# Patient Record
Sex: MALE | Race: WHITE | NOT HISPANIC OR LATINO | Employment: FULL TIME | URBAN - METROPOLITAN AREA
[De-identification: names, ages, dates, MRNs, and addresses within clinical notes are randomized per-mention and may not be internally consistent; named-entity substitution may affect disease eponyms.]

---

## 2017-05-31 ENCOUNTER — PATIENT MESSAGE (OUTPATIENT)
Dept: INTERNAL MEDICINE | Facility: CLINIC | Age: 42
End: 2017-05-31

## 2017-05-31 RX ORDER — CLONAZEPAM 1 MG/1
1 TABLET ORAL 2 TIMES DAILY PRN
Qty: 21 TABLET | Refills: 0 | OUTPATIENT
Start: 2017-05-31 | End: 2018-05-31

## 2017-06-01 ENCOUNTER — OFFICE VISIT (OUTPATIENT)
Dept: INTERNAL MEDICINE | Facility: CLINIC | Age: 42
End: 2017-06-01
Payer: COMMERCIAL

## 2017-06-01 VITALS
TEMPERATURE: 99 F | SYSTOLIC BLOOD PRESSURE: 142 MMHG | BODY MASS INDEX: 28.78 KG/M2 | WEIGHT: 212.5 LBS | HEIGHT: 72 IN | HEART RATE: 90 BPM | DIASTOLIC BLOOD PRESSURE: 84 MMHG | OXYGEN SATURATION: 95 %

## 2017-06-01 DIAGNOSIS — F43.0 ACUTE STRESS REACTION: ICD-10-CM

## 2017-06-01 DIAGNOSIS — R03.0 ELEVATED BLOOD PRESSURE READING: Primary | ICD-10-CM

## 2017-06-01 PROCEDURE — 99999 PR PBB SHADOW E&M-EST. PATIENT-LVL III: CPT | Mod: PBBFAC,,, | Performed by: FAMILY MEDICINE

## 2017-06-01 PROCEDURE — 99213 OFFICE O/P EST LOW 20 MIN: CPT | Mod: S$GLB,,, | Performed by: FAMILY MEDICINE

## 2017-06-01 RX ORDER — CLONAZEPAM 1 MG/1
1 TABLET ORAL 2 TIMES DAILY PRN
Qty: 21 TABLET | Refills: 3 | Status: SHIPPED | OUTPATIENT
Start: 2017-06-01 | End: 2018-09-24

## 2017-06-01 NOTE — PROGRESS NOTES
Chief Complaint:    Chief Complaint   Patient presents with    Medication Refill       History of Present Illness:    Patient presents today for a six-month follow-up is still going through his divorce and tests recently finished a process.  These going to the gym has trouble with sleep sometimes.  Denies any depression.  Takes Klonopin as needed.    ROS:  Review of Systems   Constitutional: Negative for activity change, chills, fatigue, fever and unexpected weight change.   HENT: Negative for congestion, ear discharge, ear pain, hearing loss, postnasal drip and rhinorrhea.    Eyes: Negative for pain and visual disturbance.   Respiratory: Negative for cough, chest tightness and shortness of breath.    Cardiovascular: Negative for chest pain and palpitations.   Gastrointestinal: Negative for abdominal pain, diarrhea and vomiting.   Endocrine: Negative for heat intolerance.   Genitourinary: Negative for dysuria, flank pain, frequency and hematuria.   Musculoskeletal: Negative for back pain, gait problem and neck pain.   Skin: Negative for color change and rash.   Neurological: Negative for dizziness, tremors, seizures, numbness and headaches.   Psychiatric/Behavioral: Negative for agitation, hallucinations, self-injury, sleep disturbance and suicidal ideas. The patient is nervous/anxious.        Past Medical History:   Diagnosis Date    Anxiety     Hyperlipidemia        Social History:  Social History     Social History    Marital status:      Spouse name: N/A    Number of children: N/A    Years of education: N/A     Social History Main Topics    Smoking status: Former Smoker     Packs/day: 1.00     Years: 17.00    Smokeless tobacco: Never Used    Alcohol use 1.0 oz/week     2 drink(s) per week    Drug use: No    Sexual activity: Yes     Partners: Female     Birth control/ protection: None     Other Topics Concern    None     Social History Narrative    None       Family History:   family history  includes Heart disease in his father.    Health Maintenance   Topic Date Due    Influenza Vaccine  08/01/2017    Lipid Panel  09/13/2017    TETANUS VACCINE  08/25/2026       Physical Exam:    Vital Signs  Temp: 99 °F (37.2 °C)  Temp src: Tympanic  Pulse: 90  SpO2: 95 %  BP: (!) 142/84  Pain Score: 0-No pain  Height and Weight  Height: 6' (182.9 cm)  Weight: 96.4 kg (212 lb 8.4 oz)  BSA (Calculated - sq m): 2.21 sq meters  BMI (Calculated): 28.9  Weight in (lb) to have BMI = 25: 183.9]    Body mass index is 28.82 kg/m².    Physical Exam   Constitutional: He is oriented to person, place, and time. He appears well-developed.   HENT:   Mouth/Throat: Oropharynx is clear and moist.   Eyes: Conjunctivae are normal. Pupils are equal, round, and reactive to light.   Neck: Normal range of motion. Neck supple.   Cardiovascular: Normal rate, regular rhythm and normal heart sounds.    No murmur heard.  Pulmonary/Chest: Effort normal and breath sounds normal. No respiratory distress. He has no wheezes. He has no rales. He exhibits no tenderness.   Abdominal: Soft. He exhibits no distension and no mass. There is no tenderness. There is no guarding.   Musculoskeletal: He exhibits no edema or tenderness.   Lymphadenopathy:     He has no cervical adenopathy.   Neurological: He is alert and oriented to person, place, and time. He has normal reflexes.   Skin: Skin is warm and dry.   Psychiatric: He has a normal mood and affect. His behavior is normal. Judgment and thought content normal.       Lab Results   Component Value Date    CHOL 201 (H) 09/13/2016    CHOL 189 10/14/2015    CHOL 200 (H) 06/19/2015    TRIG 173 (H) 09/13/2016    TRIG 252 (H) 10/14/2015    TRIG 406 (H) 06/19/2015    HDL 40 09/13/2016    HDL 35 (L) 10/14/2015    HDL 33 (L) 06/19/2015    TOTALCHOLEST 5.0 09/13/2016    TOTALCHOLEST 5.4 (H) 10/14/2015    TOTALCHOLEST 6.1 (H) 06/19/2015    NONHDLCHOL 161 09/13/2016    NONHDLCHOL 154 10/14/2015    NONHDLCHOL 167  06/19/2015       Lab Results   Component Value Date    HGBA1C 5.3 09/13/2016       Assessment:      ICD-10-CM ICD-9-CM   1. Elevated blood pressure reading R03.0 796.2   2. Acute stress reaction F43.0 308.9         Plan:  Patient's Klonopin was filled today.  I've advised him to monitor his blood pressure carefully take it twice a day and bring the numbers in 2 weeks.  No orders of the defined types were placed in this encounter.      Current Outpatient Prescriptions   Medication Sig Dispense Refill    clonazePAM (KLONOPIN) 1 MG tablet Take 1 tablet (1 mg total) by mouth 2 (two) times daily as needed for Anxiety. 21 tablet 3    sildenafil (VIAGRA) 100 MG tablet Take 1 tablet (100 mg total) by mouth daily as needed for Erectile Dysfunction. 10 tablet 12     No current facility-administered medications for this visit.        Medications Discontinued During This Encounter   Medication Reason    buPROPion (WELLBUTRIN XL) 150 MG TB24 tablet     clonazePAM (KLONOPIN) 1 MG tablet Reorder       No Follow-up on file.      Dr Opal Lara MD    Disclaimer: This note is prepared using voice recognition system and as such is likely to have errors and is not proof read.

## 2017-08-07 ENCOUNTER — TELEPHONE (OUTPATIENT)
Dept: FAMILY MEDICINE | Facility: CLINIC | Age: 42
End: 2017-08-07

## 2017-08-07 NOTE — TELEPHONE ENCOUNTER
Spoke with patient concerning annual exam appointment. He will contact his insurance to make sure they will pay for it before his annual is due in September.

## 2017-10-06 ENCOUNTER — TELEPHONE (OUTPATIENT)
Dept: FAMILY MEDICINE | Facility: CLINIC | Age: 42
End: 2017-10-06

## 2017-10-06 DIAGNOSIS — Z00.00 WELL ADULT HEALTH CHECK: Primary | ICD-10-CM

## 2017-10-06 NOTE — TELEPHONE ENCOUNTER
----- Message from Alcira Turpin sent at 10/6/2017  9:37 AM CDT -----  Contact: self [phone]  States that he has an appt with Dr Lara on 10/12/17 for annual. States that he would like labs order to be done on Monday. Please call back at [phone]//thank you acc

## 2017-10-09 ENCOUNTER — LAB VISIT (OUTPATIENT)
Dept: LAB | Facility: HOSPITAL | Age: 42
End: 2017-10-09
Attending: FAMILY MEDICINE
Payer: COMMERCIAL

## 2017-10-09 DIAGNOSIS — Z00.00 WELL ADULT HEALTH CHECK: ICD-10-CM

## 2017-10-09 LAB
ALBUMIN SERPL BCP-MCNC: 3.7 G/DL
ALP SERPL-CCNC: 57 U/L
ALT SERPL W/O P-5'-P-CCNC: 20 U/L
ANION GAP SERPL CALC-SCNC: 8 MMOL/L
AST SERPL-CCNC: 17 U/L
BASOPHILS # BLD AUTO: 0.02 K/UL
BASOPHILS NFR BLD: 0.2 %
BILIRUB SERPL-MCNC: 0.8 MG/DL
BUN SERPL-MCNC: 12 MG/DL
CALCIUM SERPL-MCNC: 9.2 MG/DL
CHLORIDE SERPL-SCNC: 105 MMOL/L
CHOLEST SERPL-MCNC: 194 MG/DL
CHOLEST/HDLC SERPL: 5.2 {RATIO}
CO2 SERPL-SCNC: 26 MMOL/L
CREAT SERPL-MCNC: 1 MG/DL
DIFFERENTIAL METHOD: ABNORMAL
EOSINOPHIL # BLD AUTO: 0.1 K/UL
EOSINOPHIL NFR BLD: 1.2 %
ERYTHROCYTE [DISTWIDTH] IN BLOOD BY AUTOMATED COUNT: 12.7 %
EST. GFR  (AFRICAN AMERICAN): >60 ML/MIN/1.73 M^2
EST. GFR  (NON AFRICAN AMERICAN): >60 ML/MIN/1.73 M^2
GLUCOSE SERPL-MCNC: 76 MG/DL
HCT VFR BLD AUTO: 45.7 %
HDLC SERPL-MCNC: 37 MG/DL
HDLC SERPL: 19.1 %
HGB BLD-MCNC: 15.2 G/DL
LDLC SERPL CALC-MCNC: 97.8 MG/DL
LYMPHOCYTES # BLD AUTO: 2.4 K/UL
LYMPHOCYTES NFR BLD: 29.9 %
MCH RBC QN AUTO: 32.5 PG
MCHC RBC AUTO-ENTMCNC: 33.3 G/DL
MCV RBC AUTO: 98 FL
MONOCYTES # BLD AUTO: 0.8 K/UL
MONOCYTES NFR BLD: 9.2 %
NEUTROPHILS # BLD AUTO: 4.8 K/UL
NEUTROPHILS NFR BLD: 59 %
NONHDLC SERPL-MCNC: 157 MG/DL
PLATELET # BLD AUTO: 229 K/UL
PMV BLD AUTO: 10.6 FL
POTASSIUM SERPL-SCNC: 4.2 MMOL/L
PROT SERPL-MCNC: 7.3 G/DL
RBC # BLD AUTO: 4.67 M/UL
SODIUM SERPL-SCNC: 139 MMOL/L
TRIGL SERPL-MCNC: 296 MG/DL
WBC # BLD AUTO: 8.14 K/UL

## 2017-10-09 PROCEDURE — 36415 COLL VENOUS BLD VENIPUNCTURE: CPT | Mod: PO

## 2017-10-09 PROCEDURE — 80053 COMPREHEN METABOLIC PANEL: CPT

## 2017-10-09 PROCEDURE — 80061 LIPID PANEL: CPT

## 2017-10-09 PROCEDURE — 85025 COMPLETE CBC W/AUTO DIFF WBC: CPT

## 2017-10-09 PROCEDURE — 83036 HEMOGLOBIN GLYCOSYLATED A1C: CPT

## 2017-10-10 LAB
ESTIMATED AVG GLUCOSE: 97 MG/DL
HBA1C MFR BLD HPLC: 5 %

## 2017-10-12 ENCOUNTER — OFFICE VISIT (OUTPATIENT)
Dept: FAMILY MEDICINE | Facility: CLINIC | Age: 42
End: 2017-10-12
Payer: COMMERCIAL

## 2017-10-12 VITALS
OXYGEN SATURATION: 95 % | BODY MASS INDEX: 28.93 KG/M2 | TEMPERATURE: 99 F | HEIGHT: 72 IN | SYSTOLIC BLOOD PRESSURE: 128 MMHG | HEART RATE: 80 BPM | DIASTOLIC BLOOD PRESSURE: 74 MMHG | WEIGHT: 213.63 LBS

## 2017-10-12 DIAGNOSIS — Z72.0 TOBACCO ABUSE: ICD-10-CM

## 2017-10-12 DIAGNOSIS — Z00.00 WELL ADULT EXAM: Primary | ICD-10-CM

## 2017-10-12 PROCEDURE — 99396 PREV VISIT EST AGE 40-64: CPT | Mod: S$GLB,,, | Performed by: FAMILY MEDICINE

## 2017-10-12 PROCEDURE — 99999 PR PBB SHADOW E&M-EST. PATIENT-LVL III: CPT | Mod: PBBFAC,,, | Performed by: FAMILY MEDICINE

## 2017-10-12 NOTE — PROGRESS NOTES
Chief Complaint:    Chief Complaint   Patient presents with    Follow-up       History of Present Illness:    Here for physical.  Doing well  Please unable to exercise due to recent episode of Achillis tendinitis and plantar fasciitis.  Still smokes but does know that he needs to quit.  Not on any medication    ROS:  Review of Systems   Constitutional: Negative for activity change, chills, fatigue, fever and unexpected weight change.   HENT: Negative for congestion, ear discharge, ear pain, hearing loss, postnasal drip and rhinorrhea.    Eyes: Negative for pain and visual disturbance.   Respiratory: Negative for cough, chest tightness and shortness of breath.    Cardiovascular: Negative for chest pain and palpitations.   Gastrointestinal: Negative for abdominal pain, diarrhea and vomiting.   Endocrine: Negative for heat intolerance.   Genitourinary: Negative for dysuria, flank pain, frequency and hematuria.   Musculoskeletal: Negative for back pain, gait problem and neck pain.   Skin: Negative for color change and rash.   Neurological: Negative for dizziness, tremors, seizures, numbness and headaches.   Psychiatric/Behavioral: Negative for agitation, hallucinations, self-injury, sleep disturbance and suicidal ideas. The patient is not nervous/anxious.        Past Medical History:   Diagnosis Date    Anxiety     Hyperlipidemia        Social History:  Social History     Social History    Marital status:      Spouse name: N/A    Number of children: N/A    Years of education: N/A     Social History Main Topics    Smoking status: Current Every Day Smoker     Packs/day: 1.00     Years: 17.00    Smokeless tobacco: Never Used    Alcohol use 1.0 oz/week     2 Standard drinks or equivalent per week      Comment: social    Drug use: No    Sexual activity: Yes     Partners: Female     Birth control/ protection: None     Other Topics Concern    None     Social History Narrative    None       Family  History:   family history includes Heart disease in his father.    Health Maintenance   Topic Date Due    Pneumococcal PPSV23 (Medium Risk) (1) 07/12/1992    Lipid Panel  10/09/2018    TETANUS VACCINE  08/25/2026    Influenza Vaccine  Completed       Physical Exam:    Vital Signs  Temp: 98.6 °F (37 °C)  Temp src: Tympanic  Pulse: 80  SpO2: 95 %  BP: 128/74  BP Location: Left arm  Patient Position: Sitting  Pain Score:   2  Pain Loc: Foot  Height and Weight  Height: 6' (182.9 cm)  Weight: 96.9 kg (213 lb 10 oz)  BSA (Calculated - sq m): 2.22 sq meters  BMI (Calculated): 29  Weight in (lb) to have BMI = 25: 183.9]    Body mass index is 28.97 kg/m².    Physical Exam   Constitutional: He is oriented to person, place, and time. He appears well-developed.   HENT:   Right Ear: External ear normal.   Left Ear: External ear normal.   Mouth/Throat: Oropharynx is clear and moist.   Eyes: Conjunctivae are normal. Pupils are equal, round, and reactive to light.   Neck: Normal range of motion. Neck supple.   Cardiovascular: Normal rate, regular rhythm and normal heart sounds.    No murmur heard.  Pulmonary/Chest: Effort normal and breath sounds normal. No respiratory distress. He has no wheezes. He has no rales. He exhibits no tenderness.   Abdominal: Soft. He exhibits no distension and no mass. There is no tenderness. There is no guarding.   Musculoskeletal: He exhibits no edema or tenderness.   Lymphadenopathy:     He has no cervical adenopathy.   Neurological: He is alert and oriented to person, place, and time. He has normal reflexes.   Skin: Skin is warm and dry.   Psychiatric: He has a normal mood and affect. His behavior is normal. Judgment and thought content normal.       Lab Results   Component Value Date    CHOL 194 10/09/2017    CHOL 201 (H) 09/13/2016    CHOL 189 10/14/2015    TRIG 296 (H) 10/09/2017    TRIG 173 (H) 09/13/2016    TRIG 252 (H) 10/14/2015    HDL 37 (L) 10/09/2017    HDL 40 09/13/2016    HDL 35 (L)  10/14/2015    TOTALCHOLEST 5.2 (H) 10/09/2017    TOTALCHOLEST 5.0 09/13/2016    TOTALCHOLEST 5.4 (H) 10/14/2015    NONHDLCHOL 157 10/09/2017    NONHDLCHOL 161 09/13/2016    NONHDLCHOL 154 10/14/2015       Lab Results   Component Value Date    HGBA1C 5.0 10/09/2017       Assessment:      ICD-10-CM ICD-9-CM   1. Well adult exam Z00.00 V70.0   2. Tobacco abuse Z72.0 305.1         Plan:      Continue current meds,  Labs reviewed the patient  Refer to smoking cessation program.  Start an exercise program  Orders Placed This Encounter   Procedures    Ambulatory referral to Smoking Cessation Program       Current Outpatient Prescriptions   Medication Sig Dispense Refill    clonazePAM (KLONOPIN) 1 MG tablet Take 1 tablet (1 mg total) by mouth 2 (two) times daily as needed for Anxiety. 21 tablet 3    sildenafil (VIAGRA) 100 MG tablet Take 1 tablet (100 mg total) by mouth daily as needed for Erectile Dysfunction. 10 tablet 12     No current facility-administered medications for this visit.        There are no discontinued medications.    No Follow-up on file.      Opal Lara MD          Disclaimer: This note is prepared using voice recognition system and as such is likely to have errors and is not proof read.

## 2018-05-23 ENCOUNTER — HOSPITAL ENCOUNTER (OUTPATIENT)
Facility: HOSPITAL | Age: 43
Discharge: HOME | End: 2018-05-25
Attending: EMERGENCY MEDICINE | Admitting: SURGERY
Payer: COMMERCIAL

## 2018-05-23 ENCOUNTER — OFFICE VISIT (OUTPATIENT)
Dept: FAMILY MEDICINE | Facility: CLINIC | Age: 43
End: 2018-05-23
Payer: COMMERCIAL

## 2018-05-23 VITALS
BODY MASS INDEX: 29.35 KG/M2 | HEIGHT: 72 IN | DIASTOLIC BLOOD PRESSURE: 80 MMHG | SYSTOLIC BLOOD PRESSURE: 129 MMHG | HEART RATE: 68 BPM | OXYGEN SATURATION: 96 % | WEIGHT: 216.69 LBS | TEMPERATURE: 99 F

## 2018-05-23 DIAGNOSIS — R10.13 EPIGASTRIC PAIN: ICD-10-CM

## 2018-05-23 DIAGNOSIS — R10.9 ABDOMINAL PAIN, ACUTE: Primary | ICD-10-CM

## 2018-05-23 DIAGNOSIS — K35.80 ACUTE APPENDICITIS, UNSPECIFIED ACUTE APPENDICITIS TYPE: Primary | ICD-10-CM

## 2018-05-23 LAB
ALBUMIN SERPL BCP-MCNC: 4 G/DL
ALP SERPL-CCNC: 49 U/L
ALT SERPL W/O P-5'-P-CCNC: 24 U/L
ANION GAP SERPL CALC-SCNC: 11 MMOL/L
AST SERPL-CCNC: 20 U/L
BASOPHILS # BLD AUTO: 0.03 K/UL
BASOPHILS NFR BLD: 0.2 %
BILIRUB SERPL-MCNC: 0.7 MG/DL
BILIRUB UR QL STRIP: NEGATIVE
BUN SERPL-MCNC: 12 MG/DL
CALCIUM SERPL-MCNC: 9.5 MG/DL
CHLORIDE SERPL-SCNC: 105 MMOL/L
CLARITY UR: CLEAR
CO2 SERPL-SCNC: 23 MMOL/L
COLOR UR: YELLOW
CREAT SERPL-MCNC: 1.1 MG/DL
DIFFERENTIAL METHOD: ABNORMAL
EOSINOPHIL # BLD AUTO: 0.1 K/UL
EOSINOPHIL NFR BLD: 0.6 %
ERYTHROCYTE [DISTWIDTH] IN BLOOD BY AUTOMATED COUNT: 12.7 %
EST. GFR  (AFRICAN AMERICAN): >60 ML/MIN/1.73 M^2
EST. GFR  (NON AFRICAN AMERICAN): >60 ML/MIN/1.73 M^2
GLUCOSE SERPL-MCNC: 90 MG/DL
GLUCOSE UR QL STRIP: NEGATIVE
HCT VFR BLD AUTO: 45.3 %
HGB BLD-MCNC: 16 G/DL
HGB UR QL STRIP: NEGATIVE
KETONES UR QL STRIP: NEGATIVE
LEUKOCYTE ESTERASE UR QL STRIP: NEGATIVE
LIPASE SERPL-CCNC: 30 U/L
LYMPHOCYTES # BLD AUTO: 2.4 K/UL
LYMPHOCYTES NFR BLD: 12.4 %
MCH RBC QN AUTO: 33.8 PG
MCHC RBC AUTO-ENTMCNC: 35.3 G/DL
MCV RBC AUTO: 96 FL
MONOCYTES # BLD AUTO: 1.4 K/UL
MONOCYTES NFR BLD: 7 %
NEUTROPHILS # BLD AUTO: 15.6 K/UL
NEUTROPHILS NFR BLD: 79.8 %
NITRITE UR QL STRIP: NEGATIVE
PH UR STRIP: 6 [PH] (ref 5–8)
PLATELET # BLD AUTO: 225 K/UL
PMV BLD AUTO: 10.5 FL
POTASSIUM SERPL-SCNC: 4 MMOL/L
PROCALCITONIN SERPL IA-MCNC: 0.02 NG/ML
PROT SERPL-MCNC: 7.4 G/DL
PROT UR QL STRIP: NEGATIVE
RBC # BLD AUTO: 4.74 M/UL
SODIUM SERPL-SCNC: 139 MMOL/L
SP GR UR STRIP: >=1.03 (ref 1–1.03)
URN SPEC COLLECT METH UR: ABNORMAL
UROBILINOGEN UR STRIP-ACNC: NEGATIVE EU/DL
WBC # BLD AUTO: 19.49 K/UL

## 2018-05-23 PROCEDURE — 99999 PR PBB SHADOW E&M-EST. PATIENT-LVL III: CPT | Mod: PBBFAC,,, | Performed by: FAMILY MEDICINE

## 2018-05-23 PROCEDURE — 25500020 PHARM REV CODE 255: Performed by: EMERGENCY MEDICINE

## 2018-05-23 PROCEDURE — 3008F BODY MASS INDEX DOCD: CPT | Mod: CPTII,S$GLB,, | Performed by: FAMILY MEDICINE

## 2018-05-23 PROCEDURE — 11000001 HC ACUTE MED/SURG PRIVATE ROOM

## 2018-05-23 PROCEDURE — 81003 URINALYSIS AUTO W/O SCOPE: CPT

## 2018-05-23 PROCEDURE — 25000003 PHARM REV CODE 250: Performed by: EMERGENCY MEDICINE

## 2018-05-23 PROCEDURE — 96361 HYDRATE IV INFUSION ADD-ON: CPT

## 2018-05-23 PROCEDURE — 99213 OFFICE O/P EST LOW 20 MIN: CPT | Mod: S$GLB,,, | Performed by: FAMILY MEDICINE

## 2018-05-23 PROCEDURE — G0378 HOSPITAL OBSERVATION PER HR: HCPCS

## 2018-05-23 PROCEDURE — 85025 COMPLETE CBC W/AUTO DIFF WBC: CPT

## 2018-05-23 PROCEDURE — 96365 THER/PROPH/DIAG IV INF INIT: CPT

## 2018-05-23 PROCEDURE — 83690 ASSAY OF LIPASE: CPT

## 2018-05-23 PROCEDURE — 80053 COMPREHEN METABOLIC PANEL: CPT

## 2018-05-23 PROCEDURE — 84145 PROCALCITONIN (PCT): CPT

## 2018-05-23 PROCEDURE — 63600175 PHARM REV CODE 636 W HCPCS: Performed by: EMERGENCY MEDICINE

## 2018-05-23 PROCEDURE — 93010 ELECTROCARDIOGRAM REPORT: CPT | Mod: ,,, | Performed by: INTERNAL MEDICINE

## 2018-05-23 PROCEDURE — 94761 N-INVAS EAR/PLS OXIMETRY MLT: CPT

## 2018-05-23 PROCEDURE — 99285 EMERGENCY DEPT VISIT HI MDM: CPT | Mod: 25

## 2018-05-23 RX ORDER — SODIUM CHLORIDE 9 MG/ML
INJECTION, SOLUTION INTRAVENOUS CONTINUOUS
Status: DISCONTINUED | OUTPATIENT
Start: 2018-05-23 | End: 2018-05-25 | Stop reason: HOSPADM

## 2018-05-23 RX ORDER — ONDANSETRON 8 MG/1
8 TABLET, ORALLY DISINTEGRATING ORAL EVERY 8 HOURS PRN
Status: DISCONTINUED | OUTPATIENT
Start: 2018-05-23 | End: 2018-05-24

## 2018-05-23 RX ORDER — ACETAMINOPHEN 325 MG/1
650 TABLET ORAL EVERY 8 HOURS PRN
Status: DISCONTINUED | OUTPATIENT
Start: 2018-05-23 | End: 2018-05-24

## 2018-05-23 RX ORDER — RAMELTEON 8 MG/1
8 TABLET ORAL NIGHTLY PRN
Status: DISCONTINUED | OUTPATIENT
Start: 2018-05-23 | End: 2018-05-24

## 2018-05-23 RX ORDER — BUPROPION HYDROCHLORIDE 150 MG/1
150 TABLET ORAL DAILY
Qty: 30 TABLET | Refills: 11 | Status: SHIPPED | OUTPATIENT
Start: 2018-05-23 | End: 2018-09-24

## 2018-05-23 RX ORDER — DIPHENHYDRAMINE HYDROCHLORIDE 50 MG/ML
25 INJECTION INTRAMUSCULAR; INTRAVENOUS EVERY 4 HOURS PRN
Status: DISCONTINUED | OUTPATIENT
Start: 2018-05-23 | End: 2018-05-24

## 2018-05-23 RX ORDER — SODIUM CHLORIDE 0.9 % (FLUSH) 0.9 %
3 SYRINGE (ML) INJECTION
Status: DISCONTINUED | OUTPATIENT
Start: 2018-05-23 | End: 2018-05-24 | Stop reason: SDUPTHER

## 2018-05-23 RX ADMIN — IOHEXOL 75 ML: 350 INJECTION, SOLUTION INTRAVENOUS at 09:05

## 2018-05-23 RX ADMIN — IOHEXOL 30 ML: 350 INJECTION, SOLUTION INTRAVENOUS at 06:05

## 2018-05-23 RX ADMIN — SODIUM CHLORIDE 1000 ML: 0.9 INJECTION, SOLUTION INTRAVENOUS at 06:05

## 2018-05-23 RX ADMIN — AMPICILLIN SODIUM AND SULBACTAM SODIUM 3 G: 2; 1 INJECTION, POWDER, FOR SOLUTION INTRAMUSCULAR; INTRAVENOUS at 10:05

## 2018-05-23 RX ADMIN — SODIUM CHLORIDE: 0.9 INJECTION, SOLUTION INTRAVENOUS at 11:05

## 2018-05-23 NOTE — ED NOTES
Pt reports pain in right lower quad started at 1400. Pt states the pain radiates to the right flank and back.

## 2018-05-23 NOTE — LETTER
May 25, 2018    Manjinder Witt  [address] [place] [facility] [id number] [facility] [address]  Phone: [phone]  Fax: [phone] May 25, 2018     Patient: Manjinder Witt   YOB: 1975   Date of Visit: 5/23/2018       To Whom It May Concern:    Please excuse Manjinder Witt from work for medical reasons on 5/24/18 - 6/4/18.  It is my medical opinion that Manjinder Witt may return to work on 6/4/18.    If you have any questions or concerns, please don't hesitate to call.    Sincerely,        Natasha Muhammad PA-C

## 2018-05-23 NOTE — PROGRESS NOTES
Chief Complaint:    Chief Complaint   Patient presents with    Abdominal Pain       History of Present Illness:    Patient presents today complaining of abdominal pain that started this morning and gradually got worse pain is pretty severe this time associated with nausea mostly in the epigastrium with some radiation to the back.  He also has a small cystic appearing lesion on his laryngeal cartilage which is not painful.    ROS:  Review of Systems   Constitutional: Negative for activity change, chills, fatigue, fever and unexpected weight change.   HENT: Negative for congestion, ear discharge, ear pain, hearing loss, postnasal drip and rhinorrhea.    Eyes: Negative for pain and visual disturbance.   Respiratory: Negative for cough, chest tightness and shortness of breath.    Cardiovascular: Negative for chest pain and palpitations.   Gastrointestinal: Positive for abdominal pain and nausea. Negative for diarrhea and vomiting.   Endocrine: Negative for heat intolerance.   Genitourinary: Negative for dysuria, flank pain, frequency and hematuria.   Musculoskeletal: Negative for back pain, gait problem and neck pain.   Skin: Negative for color change and rash.   Neurological: Negative for dizziness, tremors, seizures, numbness and headaches.   Psychiatric/Behavioral: Negative for agitation, hallucinations, self-injury, sleep disturbance and suicidal ideas. The patient is not nervous/anxious.        Past Medical History:   Diagnosis Date    Anxiety     Hyperlipidemia        Social History:  Social History     Social History    Marital status:      Spouse name: N/A    Number of children: N/A    Years of education: N/A     Social History Main Topics    Smoking status: Current Every Day Smoker     Packs/day: 1.00     Years: 17.00    Smokeless tobacco: Never Used    Alcohol use 1.0 oz/week     2 Standard drinks or equivalent per week      Comment: social    Drug use: No    Sexual activity: Yes      [facility] ETA 45-60 mins Partners: Female     Birth control/ protection: None     Other Topics Concern    None     Social History Narrative    None       Family History:   family history includes Heart disease in his father.    Health Maintenance   Topic Date Due    Pneumococcal PPSV23 (Medium Risk) (1) 07/12/1992    Influenza Vaccine  08/01/2018    Lipid Panel  10/09/2018    TETANUS VACCINE  08/25/2026       Physical Exam:    Vital Signs  Temp: 99.1 °F (37.3 °C)  Temp src: Tympanic  Pulse: 68  SpO2: 96 %  BP: 129/80  BP Location: Left arm  Patient Position: Sitting  Pain Score:   7  Pain Loc: Abdomen  Height and Weight  Height: 6' (182.9 cm)  Weight: 98.3 kg (216 lb 11.4 oz)  BSA (Calculated - sq m): 2.23 sq meters  BMI (Calculated): 29.5  Weight in (lb) to have BMI = 25: 183.9]    Body mass index is 29.39 kg/m².    Physical Exam   Constitutional: He is oriented to person, place, and time. He appears well-developed.   HENT:   Head:       Mouth/Throat: Oropharynx is clear and moist.   Eyes: Conjunctivae are normal. Pupils are equal, round, and reactive to light.   Neck: Normal range of motion. Neck supple.   Cardiovascular: Normal rate, regular rhythm and normal heart sounds.    No murmur heard.  Pulmonary/Chest: Effort normal and breath sounds normal. No respiratory distress. He has no wheezes. He has no rales. He exhibits no tenderness.   Abdominal: Soft. He exhibits no distension and no mass. There is tenderness in the right lower quadrant and epigastric area. There is no guarding.   Musculoskeletal: He exhibits no edema or tenderness.   Lymphadenopathy:     He has no cervical adenopathy.   Neurological: He is alert and oriented to person, place, and time. He has normal reflexes.   Skin: Skin is warm and dry.   Psychiatric: He has a normal mood and affect. His behavior is normal. Judgment and thought content normal.       Results for orders placed or performed in visit on 10/09/17   Lipid panel   Result Value Ref Range     Cholesterol 194 120 - 199 mg/dL    Triglycerides 296 (H) 30 - 150 mg/dL    HDL 37 (L) 40 - 75 mg/dL    LDL Cholesterol 97.8 63.0 - 159.0 mg/dL    HDL/Chol Ratio 19.1 (L) 20.0 - 50.0 %    Total Cholesterol/HDL Ratio 5.2 (H) 2.0 - 5.0    Non-HDL Cholesterol 157 mg/dL   CBC auto differential   Result Value Ref Range    WBC 8.14 3.90 - 12.70 K/uL    RBC 4.67 4.60 - 6.20 M/uL    Hemoglobin 15.2 14.0 - 18.0 g/dL    Hematocrit 45.7 40.0 - 54.0 %    MCV 98 82 - 98 fL    MCH 32.5 (H) 27.0 - 31.0 pg    MCHC 33.3 32.0 - 36.0 g/dL    RDW 12.7 11.5 - 14.5 %    Platelets 229 150 - 350 K/uL    MPV 10.6 9.2 - 12.9 fL    Gran # (ANC) 4.8 1.8 - 7.7 K/uL    Lymph # 2.4 1.0 - 4.8 K/uL    Mono # 0.8 0.3 - 1.0 K/uL    Eos # 0.1 0.0 - 0.5 K/uL    Baso # 0.02 0.00 - 0.20 K/uL    Gran% 59.0 38.0 - 73.0 %    Lymph% 29.9 18.0 - 48.0 %    Mono% 9.2 4.0 - 15.0 %    Eosinophil% 1.2 0.0 - 8.0 %    Basophil% 0.2 0.0 - 1.9 %    Differential Method Automated    Comprehensive metabolic panel   Result Value Ref Range    Sodium 139 136 - 145 mmol/L    Potassium 4.2 3.5 - 5.1 mmol/L    Chloride 105 95 - 110 mmol/L    CO2 26 23 - 29 mmol/L    Glucose 76 70 - 110 mg/dL    BUN, Bld 12 6 - 20 mg/dL    Creatinine 1.0 0.5 - 1.4 mg/dL    Calcium 9.2 8.7 - 10.5 mg/dL    Total Protein 7.3 6.0 - 8.4 g/dL    Albumin 3.7 3.5 - 5.2 g/dL    Total Bilirubin 0.8 0.1 - 1.0 mg/dL    Alkaline Phosphatase 57 55 - 135 U/L    AST 17 10 - 40 U/L    ALT 20 10 - 44 U/L    Anion Gap 8 8 - 16 mmol/L    eGFR if African American >60.0 >60 mL/min/1.73 m^2    eGFR if non African American >60.0 >60 mL/min/1.73 m^2   Hemoglobin A1c   Result Value Ref Range    Hemoglobin A1C 5.0 4.0 - 5.6 %    Estimated Avg Glucose 97 68 - 131 mg/dL         Lab Results   Component Value Date    HGBA1C 5.0 10/09/2017       Assessment:      ICD-10-CM ICD-9-CM   1. Abdominal pain, acute R10.9 789.00     338.19         Plan:    Acute abdominal pain he will need a full workup in the ED, discussed with  patient offered to call ambulance he refused he will drive himself.  the cystic lesion appears benign we can always do an ultrasound when he comes out of the hospital.      No orders of the defined types were placed in this encounter.      Current Outpatient Prescriptions   Medication Sig Dispense Refill    clonazePAM (KLONOPIN) 1 MG tablet Take 1 tablet (1 mg total) by mouth 2 (two) times daily as needed for Anxiety. 21 tablet 3    buPROPion (WELLBUTRIN XL) 150 MG TB24 tablet Take 1 tablet (150 mg total) by mouth once daily. 30 tablet 11    sildenafil (VIAGRA) 100 MG tablet Take 1 tablet (100 mg total) by mouth daily as needed for Erectile Dysfunction. 10 tablet 12     No current facility-administered medications for this visit.        There are no discontinued medications.    No Follow-up on file.      Opal Lara MD

## 2018-05-23 NOTE — ED NOTES
Pt has bilateral expiratory wheezes and states he smokes a pack a day. The pts sats are 92% on room air. Pt denies N/V at this time.

## 2018-05-23 NOTE — ED PROVIDER NOTES
"SCRIBE #1 NOTE: I, Angelica Murguia, am scribing for, and in the presence of, Chucho Vargas Jr., MD. I have scribed the HPI, ROS and PEx.    SCRIBE #2 NOTE: I, Katerina Santana , am scribing for, and in the presence of,  Chucho Vargas Jr., MD . I have scribed the remaining portions of the note not scribed by Scribe #1.     History      Chief Complaint   Patient presents with    Abdominal Pain     " Started about 3 hours ago"       Review of patient's allergies indicates:  No Known Allergies     HPI   HPI    5/23/2018, 6:22 PM   History obtained from the patient      History of Present Illness: Manjinder Witt is a 42 y.o. male patient who presents to the Emergency Department for mid abdominal pain that radiated from the umbilical area and localized in the R lower quadrant area which onset 3 hours ago. Pt was visiting his pcp for a regular exam when he was referred to ER for further examination of symptoms. Symptoms are constant and moderate in severity. No mitigating or exacerbating factors reported. Associated sxs include nausea and diaphoresis. Patient denies any fever, chills, constipation, hematochezia, dysuria, hematuria, urinary frequency, v/d and all other sxs at this time. No prior Tx reported. Pt mentions he smokes a pack of cigarettes a day. No further complaints or concerns at this time.         Arrival mode: Personal vehicle    PCP: Opal Lara MD       Past Medical History:  Past Medical History:   Diagnosis Date    Anxiety     Hyperlipidemia        Past Surgical History:  Past Surgical History:   Procedure Laterality Date    FRACTURE SURGERY      left leg pins an screws          Family History:  Family History   Problem Relation Age of Onset    Heart disease Father        Social History:  Social History     Social History Main Topics    Smoking status: Current Every Day Smoker     Packs/day: 1.00     Years: 17.00    Smokeless tobacco: Never Used    Alcohol use 1.0 oz/week     2 Standard drinks or " equivalent per week      Comment: social    Drug use: No    Sexual activity: Yes     Partners: Female     Birth control/ protection: None       ROS   Review of Systems   Constitutional: Positive for diaphoresis. Negative for activity change, chills and fever.   HENT: Negative for congestion, drooling, ear pain, rhinorrhea, sneezing, sore throat and trouble swallowing.    Eyes: Negative for pain.   Respiratory: Negative for cough, chest tightness, shortness of breath, wheezing and stridor.    Cardiovascular: Negative for chest pain, palpitations and leg swelling.   Gastrointestinal: Positive for abdominal pain (R lower quadrant ) and nausea. Negative for abdominal distention, blood in stool, constipation, diarrhea and vomiting.   Genitourinary: Negative for difficulty urinating, dysuria, frequency, hematuria and urgency.   Musculoskeletal: Negative for arthralgias, back pain, myalgias, neck pain and neck stiffness.   Skin: Negative for pallor, rash and wound.   Neurological: Negative for dizziness, syncope, weakness, light-headedness, numbness and headaches.   All other systems reviewed and are negative.      Physical Exam      Initial Vitals [05/23/18 1727]   BP Pulse Resp Temp SpO2   (!) 151/71 74 18 98.1 °F (36.7 °C) (!) 94 %      MAP       97.67          Physical Exam  Nursing Notes and Vital Signs Reviewed.  Constitutional: Patient is in no acute distress. Well-developed and well-nourished.  Head: Atraumatic. Normocephalic.  Eyes: PERRL. EOM intact. Conjunctivae are not pale. No scleral icterus.  ENT: Mucous membranes are moist. Oropharynx is clear and symmetric.    Neck: Supple. Full ROM. No lymphadenopathy.  Cardiovascular: Regular rate. Regular rhythm. No murmurs, rubs, or gallops. Distal pulses are 2+ and symmetric.  Pulmonary/Chest: No respiratory distress. Clear to auscultation bilaterally. No wheezing or rales.  Abdominal: Soft and non-distended.  R lower quadrant tenderness.  No rebound, guarding, or  rigidity. Good bowel sounds.  Musculoskeletal: Moves all extremities. No obvious deformities. No edema. No calf tenderness.  Skin: Warm and dry.  Neurological:  Alert, awake, and appropriate.  Normal speech.  No acute focal neurological deficits are appreciated.  Psychiatric: Normal affect. Good eye contact. Appropriate in content.    ED Course    Procedures  ED Vital Signs:  Vitals:    05/23/18 1727 05/23/18 1745 05/23/18 1800 05/23/18 1830   BP: (!) 151/71 137/67 122/72 130/73   Pulse: 74 77 71 77   Resp: 18      Temp: 98.1 °F (36.7 °C)      TempSrc: Oral      SpO2: (!) 94% (!) 94% (!) 93% (!) 91%   Weight: 98.6 kg (217 lb 6 oz)      Height: 6' (1.829 m)       05/23/18 1838 05/23/18 1859 05/23/18 2031 05/23/18 2144   BP:   129/82    Pulse: 70 67 69    Resp:       Temp:    98.3 °F (36.8 °C)   TempSrc:    Oral   SpO2: 96%  95%    Weight:       Height:        05/23/18 2145 05/23/18 2301 05/24/18 0008   BP: (!) 114/57 (!) 112/59 108/65   Pulse: (!) 57 (!) 53 60   Resp:   16   Temp:   98.5 °F (36.9 °C)   TempSrc:   Oral   SpO2: 97% 97% (!) 93%   Weight:      Height:          Abnormal Lab Results:  Labs Reviewed   CBC W/ AUTO DIFFERENTIAL - Abnormal; Notable for the following:        Result Value    WBC 19.49 (*)     MCH 33.8 (*)     Gran # (ANC) 15.6 (*)     Mono # 1.4 (*)     Gran% 79.8 (*)     Lymph% 12.4 (*)     All other components within normal limits   COMPREHENSIVE METABOLIC PANEL - Abnormal; Notable for the following:     Alkaline Phosphatase 49 (*)     All other components within normal limits   URINALYSIS - Abnormal; Notable for the following:     Specific Gravity, UA >=1.030 (*)     All other components within normal limits   LIPASE   PROCALCITONIN        All Lab Results:  Results for orders placed or performed during the hospital encounter of 05/23/18   CBC W/ AUTO DIFFERENTIAL   Result Value Ref Range    WBC 19.49 (H) 3.90 - 12.70 K/uL    RBC 4.74 4.60 - 6.20 M/uL    Hemoglobin 16.0 14.0 - 18.0 g/dL     Hematocrit 45.3 40.0 - 54.0 %    MCV 96 82 - 98 fL    MCH 33.8 (H) 27.0 - 31.0 pg    MCHC 35.3 32.0 - 36.0 g/dL    RDW 12.7 11.5 - 14.5 %    Platelets 225 150 - 350 K/uL    MPV 10.5 9.2 - 12.9 fL    Gran # (ANC) 15.6 (H) 1.8 - 7.7 K/uL    Lymph # 2.4 1.0 - 4.8 K/uL    Mono # 1.4 (H) 0.3 - 1.0 K/uL    Eos # 0.1 0.0 - 0.5 K/uL    Baso # 0.03 0.00 - 0.20 K/uL    Gran% 79.8 (H) 38.0 - 73.0 %    Lymph% 12.4 (L) 18.0 - 48.0 %    Mono% 7.0 4.0 - 15.0 %    Eosinophil% 0.6 0.0 - 8.0 %    Basophil% 0.2 0.0 - 1.9 %    Differential Method Automated    Comp. Metabolic Panel   Result Value Ref Range    Sodium 139 136 - 145 mmol/L    Potassium 4.0 3.5 - 5.1 mmol/L    Chloride 105 95 - 110 mmol/L    CO2 23 23 - 29 mmol/L    Glucose 90 70 - 110 mg/dL    BUN, Bld 12 6 - 20 mg/dL    Creatinine 1.1 0.5 - 1.4 mg/dL    Calcium 9.5 8.7 - 10.5 mg/dL    Total Protein 7.4 6.0 - 8.4 g/dL    Albumin 4.0 3.5 - 5.2 g/dL    Total Bilirubin 0.7 0.1 - 1.0 mg/dL    Alkaline Phosphatase 49 (L) 55 - 135 U/L    AST 20 10 - 40 U/L    ALT 24 10 - 44 U/L    Anion Gap 11 8 - 16 mmol/L    eGFR if African American >60 >60 mL/min/1.73 m^2    eGFR if non African American >60 >60 mL/min/1.73 m^2   Lipase   Result Value Ref Range    Lipase 30 4 - 60 U/L   Urinalysis - Clean Catch   Result Value Ref Range    Specimen UA Urine, Clean Catch     Color, UA Yellow Yellow, Straw, Jackeline    Appearance, UA Clear Clear    pH, UA 6.0 5.0 - 8.0    Specific Gravity, UA >=1.030 (A) 1.005 - 1.030    Protein, UA Negative Negative    Glucose, UA Negative Negative    Ketones, UA Negative Negative    Bilirubin (UA) Negative Negative    Occult Blood UA Negative Negative    Nitrite, UA Negative Negative    Urobilinogen, UA Negative <2.0 EU/dL    Leukocytes, UA Negative Negative   Procalcitonin   Result Value Ref Range    Procalcitonin 0.02 <0.25 ng/mL         Imaging Results:  Imaging Results          CT Abdomen Pelvis With Contrast (Final result)  Result time 05/23/18 21:25:09     Final result by Naresh Hannah MD (05/23/18 21:25:09)                 Impression:      Evidence of appendicitis.    All CT scans at this facility use dose modulation, iterative reconstruction and/or weight based dosing when appropriate to reduce radiation dose to as low as reasonably achievable.      Electronically signed by: Naresh Hannah MD  Date:    05/23/2018  Time:    21:25             Narrative:    EXAMINATION:  CT ABDOMEN PELVIS WITH CONTRAST    CLINICAL HISTORY:  RLQ abd pain;    TECHNIQUE:  Procedure performed with 75ml Omnipaque 350.    COMPARISON:  None    FINDINGS:  CT scan of the Abdomen with contrast:    No abnormality of the liver, spleen, or pancreas is seen.    No abnormality of the gallbladder is seen.    No abnormality of the kidneys is seen.    The appendix measures 7 mm diameter and there is adjacent periappendiceal fat stranding suspicious for appendicitis.  No evidence of abscess or free air.  No free fluid.    No significant findings at the lung bases.                               The EKG was ordered, reviewed, and independently interpreted by the ED provider.  Interpretation time: 17:35  Rate: 68 BPM  Rhythm: normal sinus rhythm  Interpretation: No acute ST&T changes. No STEMI.             The Emergency Provider reviewed the vital signs and test results, which are outlined above.    ED Discussion       9:55 PM: Discussed case with Dr. Dillard (General Surgery). Dr. Dillard agrees with current care and management of pt and accepts admission. Dr. Dillard recommends pt be NPO except for ice chips, given pain and nausea medicine as needed, and IV fluids.  Admitting Service: Hospital medicine   Admitting Physician: Dr. Dillard  Admit to: Med/Surg    9:57 PM: Re-evaluated pt. I have discussed test results, shared treatment plan, and the need for admission with patient and family at bedside. Pt and family express understanding at this time and agree with all information. All questions  answered. Pt and family have no further questions or concerns at this time. Pt is ready for admit.      ED Medication(s):  Medications   ampicillin-sulbactam 3 g in sodium chloride 0.9 % 100 mL IVPB (ready to mix system) (0 g Intravenous Stopped 5/23/18 2319)   sodium chloride 0.9% flush 3 mL (not administered)   ondansetron disintegrating tablet 8 mg (not administered)   promethazine (PHENERGAN) 6.25 mg in dextrose 5 % 50 mL IVPB (not administered)   ramelteon tablet 8 mg (not administered)   acetaminophen tablet 650 mg (not administered)   diphenhydrAMINE injection 25 mg (not administered)   0.9%  NaCl infusion ( Intravenous New Bag 5/23/18 2309)   nicotine 21 mg/24 hr 1 patch (1 patch Transdermal Patch Applied 5/24/18 0103)   sodium chloride 0.9% bolus 1,000 mL (0 mLs Intravenous Stopped 5/23/18 2053)   omnipaque 350 iohexol 75 mL (75 mLs Intravenous Given 5/23/18 2105)   omnipaque 350 iohexol 30 mL (30 mLs Oral Given 5/23/18 1830)       Current Discharge Medication List                Medical Decision Making    Medical Decision Making:   Clinical Tests:   Lab Tests: Ordered and Reviewed  Radiological Study: Ordered and Reviewed  Medical Tests: Ordered and Reviewed           Scribe Attestation:   Scribe #1: I performed the above scribed service and the documentation accurately describes the services I performed. I attest to the accuracy of the note.    Attending:   Physician Attestation Statement for Scribe #1: IChucho Jr., MD, personally performed the services described in this documentation, as scribed by Angelica Murguia, in my presence, and it is both accurate and complete.       Scribe Attestation:   Scribe #2: I performed the above scribed service and the documentation accurately describes the services I performed. I attest to the accuracy of the note.    Attending Attestation:           Physician Attestation for Scribe:    Physician Attestation Statement for Scribe #2: Chucho HICKS Jr., MD,  reviewed documentation, as scribed by Katerina Santana  in my presence, and it is both accurate and complete. I also acknowledge and confirm the content of the note done by Scribe #1.          Clinical Impression       ICD-10-CM ICD-9-CM   1. Acute appendicitis, unspecified acute appendicitis type K35.80 540.9   2. Epigastric pain R10.13 789.06       Disposition:   Disposition: Admitted  Condition: Julian Vargas Jr., MD  05/24/18 0220

## 2018-05-24 ENCOUNTER — SURGERY (OUTPATIENT)
Age: 43
End: 2018-05-24

## 2018-05-24 ENCOUNTER — ANESTHESIA (OUTPATIENT)
Dept: SURGERY | Facility: HOSPITAL | Age: 43
End: 2018-05-24
Payer: COMMERCIAL

## 2018-05-24 ENCOUNTER — ANESTHESIA EVENT (OUTPATIENT)
Dept: SURGERY | Facility: HOSPITAL | Age: 43
End: 2018-05-24
Payer: COMMERCIAL

## 2018-05-24 LAB
ANION GAP SERPL CALC-SCNC: 8 MMOL/L
BASOPHILS # BLD AUTO: 0.02 K/UL
BASOPHILS NFR BLD: 0.2 %
BUN SERPL-MCNC: 9 MG/DL
CALCIUM SERPL-MCNC: 9 MG/DL
CHLORIDE SERPL-SCNC: 107 MMOL/L
CO2 SERPL-SCNC: 25 MMOL/L
CREAT SERPL-MCNC: 1 MG/DL
DIFFERENTIAL METHOD: ABNORMAL
EOSINOPHIL # BLD AUTO: 0.1 K/UL
EOSINOPHIL NFR BLD: 1.1 %
ERYTHROCYTE [DISTWIDTH] IN BLOOD BY AUTOMATED COUNT: 12.9 %
EST. GFR  (AFRICAN AMERICAN): >60 ML/MIN/1.73 M^2
EST. GFR  (NON AFRICAN AMERICAN): >60 ML/MIN/1.73 M^2
GLUCOSE SERPL-MCNC: 89 MG/DL
HCT VFR BLD AUTO: 43.2 %
HGB BLD-MCNC: 14.6 G/DL
LYMPHOCYTES # BLD AUTO: 2.9 K/UL
LYMPHOCYTES NFR BLD: 28.8 %
MCH RBC QN AUTO: 32.8 PG
MCHC RBC AUTO-ENTMCNC: 33.8 G/DL
MCV RBC AUTO: 97 FL
MONOCYTES # BLD AUTO: 0.9 K/UL
MONOCYTES NFR BLD: 8.7 %
NEUTROPHILS # BLD AUTO: 6.2 K/UL
NEUTROPHILS NFR BLD: 61.2 %
PLATELET # BLD AUTO: 203 K/UL
PMV BLD AUTO: 10.4 FL
POTASSIUM SERPL-SCNC: 4 MMOL/L
RBC # BLD AUTO: 4.45 M/UL
SODIUM SERPL-SCNC: 140 MMOL/L
WBC # BLD AUTO: 10.19 K/UL

## 2018-05-24 PROCEDURE — 37000008 HC ANESTHESIA 1ST 15 MINUTES: Performed by: SURGERY

## 2018-05-24 PROCEDURE — 63600175 PHARM REV CODE 636 W HCPCS: Performed by: CLINIC/CENTER

## 2018-05-24 PROCEDURE — 25000003 PHARM REV CODE 250: Performed by: CLINIC/CENTER

## 2018-05-24 PROCEDURE — 99219 PR INITIAL OBSERVATION CARE,LEVL II: CPT | Mod: 57,,, | Performed by: PHYSICIAN ASSISTANT

## 2018-05-24 PROCEDURE — 44950 APPENDECTOMY: CPT | Mod: ,,, | Performed by: SURGERY

## 2018-05-24 PROCEDURE — 80048 BASIC METABOLIC PNL TOTAL CA: CPT

## 2018-05-24 PROCEDURE — 25000003 PHARM REV CODE 250: Performed by: SURGERY

## 2018-05-24 PROCEDURE — 36415 COLL VENOUS BLD VENIPUNCTURE: CPT

## 2018-05-24 PROCEDURE — S4991 NICOTINE PATCH NONLEGEND: HCPCS | Performed by: SURGERY

## 2018-05-24 PROCEDURE — 36000707: Performed by: SURGERY

## 2018-05-24 PROCEDURE — G0378 HOSPITAL OBSERVATION PER HR: HCPCS

## 2018-05-24 PROCEDURE — 63600175 PHARM REV CODE 636 W HCPCS: Performed by: ANESTHESIOLOGY

## 2018-05-24 PROCEDURE — 63600175 PHARM REV CODE 636 W HCPCS: Performed by: EMERGENCY MEDICINE

## 2018-05-24 PROCEDURE — 88304 TISSUE EXAM BY PATHOLOGIST: CPT | Mod: 26,,, | Performed by: PATHOLOGY

## 2018-05-24 PROCEDURE — 63600175 PHARM REV CODE 636 W HCPCS: Performed by: SURGERY

## 2018-05-24 PROCEDURE — 27201423 OPTIME MED/SURG SUP & DEVICES STERILE SUPPLY: Performed by: SURGERY

## 2018-05-24 PROCEDURE — 94761 N-INVAS EAR/PLS OXIMETRY MLT: CPT

## 2018-05-24 PROCEDURE — 85025 COMPLETE CBC W/AUTO DIFF WBC: CPT

## 2018-05-24 PROCEDURE — 71000039 HC RECOVERY, EACH ADD'L HOUR: Performed by: SURGERY

## 2018-05-24 PROCEDURE — 36000706: Performed by: SURGERY

## 2018-05-24 PROCEDURE — 25000003 PHARM REV CODE 250: Performed by: EMERGENCY MEDICINE

## 2018-05-24 PROCEDURE — 88304 TISSUE EXAM BY PATHOLOGIST: CPT | Performed by: PATHOLOGY

## 2018-05-24 PROCEDURE — 37000009 HC ANESTHESIA EA ADD 15 MINS: Performed by: SURGERY

## 2018-05-24 PROCEDURE — 71000033 HC RECOVERY, INTIAL HOUR: Performed by: SURGERY

## 2018-05-24 PROCEDURE — 25000003 PHARM REV CODE 250: Performed by: ANESTHESIOLOGY

## 2018-05-24 RX ORDER — MORPHINE SULFATE 4 MG/ML
2 INJECTION, SOLUTION INTRAMUSCULAR; INTRAVENOUS EVERY 5 MIN PRN
Status: DISCONTINUED | OUTPATIENT
Start: 2018-05-24 | End: 2018-05-24 | Stop reason: HOSPADM

## 2018-05-24 RX ORDER — MIDAZOLAM HYDROCHLORIDE 1 MG/ML
INJECTION, SOLUTION INTRAMUSCULAR; INTRAVENOUS
Status: DISCONTINUED | OUTPATIENT
Start: 2018-05-24 | End: 2018-05-24

## 2018-05-24 RX ORDER — BISACODYL 10 MG
10 SUPPOSITORY, RECTAL RECTAL DAILY PRN
Status: DISCONTINUED | OUTPATIENT
Start: 2018-05-24 | End: 2018-05-25 | Stop reason: HOSPADM

## 2018-05-24 RX ORDER — ONDANSETRON 8 MG/1
8 TABLET, ORALLY DISINTEGRATING ORAL EVERY 8 HOURS PRN
Status: DISCONTINUED | OUTPATIENT
Start: 2018-05-24 | End: 2018-05-25 | Stop reason: HOSPADM

## 2018-05-24 RX ORDER — ONDANSETRON 2 MG/ML
INJECTION INTRAMUSCULAR; INTRAVENOUS
Status: DISCONTINUED | OUTPATIENT
Start: 2018-05-24 | End: 2018-05-24

## 2018-05-24 RX ORDER — CLONIDINE HYDROCHLORIDE 0.1 MG/1
0.1 TABLET ORAL EVERY 6 HOURS PRN
Status: DISCONTINUED | OUTPATIENT
Start: 2018-05-24 | End: 2018-05-25 | Stop reason: HOSPADM

## 2018-05-24 RX ORDER — ROCURONIUM BROMIDE 10 MG/ML
INJECTION, SOLUTION INTRAVENOUS
Status: DISCONTINUED | OUTPATIENT
Start: 2018-05-24 | End: 2018-05-24

## 2018-05-24 RX ORDER — LIDOCAINE HYDROCHLORIDE 10 MG/ML
INJECTION INFILTRATION; PERINEURAL
Status: DISCONTINUED | OUTPATIENT
Start: 2018-05-24 | End: 2018-05-24

## 2018-05-24 RX ORDER — CHLORHEXIDINE GLUCONATE ORAL RINSE 1.2 MG/ML
10 SOLUTION DENTAL 2 TIMES DAILY
Status: DISCONTINUED | OUTPATIENT
Start: 2018-05-24 | End: 2018-05-25 | Stop reason: HOSPADM

## 2018-05-24 RX ORDER — MEPERIDINE HYDROCHLORIDE 50 MG/ML
12.5 INJECTION INTRAMUSCULAR; INTRAVENOUS; SUBCUTANEOUS ONCE AS NEEDED
Status: COMPLETED | OUTPATIENT
Start: 2018-05-24 | End: 2018-05-24

## 2018-05-24 RX ORDER — ACETAMINOPHEN 10 MG/ML
INJECTION, SOLUTION INTRAVENOUS
Status: DISCONTINUED | OUTPATIENT
Start: 2018-05-24 | End: 2018-05-24

## 2018-05-24 RX ORDER — RAMELTEON 8 MG/1
8 TABLET ORAL NIGHTLY PRN
Status: DISCONTINUED | OUTPATIENT
Start: 2018-05-24 | End: 2018-05-25 | Stop reason: HOSPADM

## 2018-05-24 RX ORDER — LACTULOSE 10 G/15ML
20 SOLUTION ORAL EVERY 6 HOURS PRN
Status: DISCONTINUED | OUTPATIENT
Start: 2018-05-24 | End: 2018-05-25 | Stop reason: HOSPADM

## 2018-05-24 RX ORDER — ACETAMINOPHEN 325 MG/1
650 TABLET ORAL EVERY 6 HOURS PRN
Status: DISCONTINUED | OUTPATIENT
Start: 2018-05-24 | End: 2018-05-25 | Stop reason: HOSPADM

## 2018-05-24 RX ORDER — METOCLOPRAMIDE HYDROCHLORIDE 5 MG/ML
10 INJECTION INTRAMUSCULAR; INTRAVENOUS EVERY 10 MIN PRN
Status: COMPLETED | OUTPATIENT
Start: 2018-05-24 | End: 2018-05-24

## 2018-05-24 RX ORDER — GLYCOPYRROLATE 0.2 MG/ML
INJECTION INTRAMUSCULAR; INTRAVENOUS
Status: DISCONTINUED | OUTPATIENT
Start: 2018-05-24 | End: 2018-05-24

## 2018-05-24 RX ORDER — HYDROMORPHONE HYDROCHLORIDE 2 MG/ML
1 INJECTION, SOLUTION INTRAMUSCULAR; INTRAVENOUS; SUBCUTANEOUS EVERY 6 HOURS PRN
Status: DISCONTINUED | OUTPATIENT
Start: 2018-05-24 | End: 2018-05-25 | Stop reason: HOSPADM

## 2018-05-24 RX ORDER — SODIUM CHLORIDE 0.9 % (FLUSH) 0.9 %
3 SYRINGE (ML) INJECTION EVERY 8 HOURS
Status: DISCONTINUED | OUTPATIENT
Start: 2018-05-24 | End: 2018-05-24 | Stop reason: HOSPADM

## 2018-05-24 RX ORDER — DIPHENHYDRAMINE HYDROCHLORIDE 50 MG/ML
25 INJECTION INTRAMUSCULAR; INTRAVENOUS EVERY 4 HOURS PRN
Status: DISCONTINUED | OUTPATIENT
Start: 2018-05-24 | End: 2018-05-25 | Stop reason: HOSPADM

## 2018-05-24 RX ORDER — SODIUM CHLORIDE 0.9 % (FLUSH) 0.9 %
3 SYRINGE (ML) INJECTION
Status: DISCONTINUED | OUTPATIENT
Start: 2018-05-24 | End: 2018-05-24

## 2018-05-24 RX ORDER — FENTANYL CITRATE 50 UG/ML
INJECTION, SOLUTION INTRAMUSCULAR; INTRAVENOUS
Status: DISCONTINUED | OUTPATIENT
Start: 2018-05-24 | End: 2018-05-24

## 2018-05-24 RX ORDER — PROPOFOL 10 MG/ML
VIAL (ML) INTRAVENOUS
Status: DISCONTINUED | OUTPATIENT
Start: 2018-05-24 | End: 2018-05-24

## 2018-05-24 RX ORDER — OXYCODONE HYDROCHLORIDE 5 MG/1
5 TABLET ORAL
Status: DISCONTINUED | OUTPATIENT
Start: 2018-05-24 | End: 2018-05-24 | Stop reason: HOSPADM

## 2018-05-24 RX ORDER — HYDROCODONE BITARTRATE AND ACETAMINOPHEN 10; 325 MG/1; MG/1
1 TABLET ORAL EVERY 4 HOURS PRN
Status: DISCONTINUED | OUTPATIENT
Start: 2018-05-24 | End: 2018-05-25 | Stop reason: HOSPADM

## 2018-05-24 RX ORDER — DEXAMETHASONE SODIUM PHOSPHATE 4 MG/ML
INJECTION, SOLUTION INTRA-ARTICULAR; INTRALESIONAL; INTRAMUSCULAR; INTRAVENOUS; SOFT TISSUE
Status: DISCONTINUED | OUTPATIENT
Start: 2018-05-24 | End: 2018-05-24

## 2018-05-24 RX ORDER — BUPROPION HYDROCHLORIDE 150 MG/1
150 TABLET ORAL DAILY
Status: DISCONTINUED | OUTPATIENT
Start: 2018-05-24 | End: 2018-05-25 | Stop reason: HOSPADM

## 2018-05-24 RX ORDER — IBUPROFEN 200 MG
1 TABLET ORAL DAILY
Status: DISCONTINUED | OUTPATIENT
Start: 2018-05-24 | End: 2018-05-24

## 2018-05-24 RX ORDER — AMOXICILLIN 250 MG
1 CAPSULE ORAL 2 TIMES DAILY
Status: DISCONTINUED | OUTPATIENT
Start: 2018-05-24 | End: 2018-05-25 | Stop reason: HOSPADM

## 2018-05-24 RX ORDER — HYDROCODONE BITARTRATE AND ACETAMINOPHEN 5; 325 MG/1; MG/1
1 TABLET ORAL EVERY 4 HOURS PRN
Status: DISCONTINUED | OUTPATIENT
Start: 2018-05-24 | End: 2018-05-25 | Stop reason: HOSPADM

## 2018-05-24 RX ORDER — CEFAZOLIN SODIUM 1 G/50ML
2 SOLUTION INTRAVENOUS
Status: COMPLETED | OUTPATIENT
Start: 2018-05-24 | End: 2018-05-24

## 2018-05-24 RX ORDER — SUCCINYLCHOLINE CHLORIDE 20 MG/ML
INJECTION INTRAMUSCULAR; INTRAVENOUS
Status: DISCONTINUED | OUTPATIENT
Start: 2018-05-24 | End: 2018-05-24

## 2018-05-24 RX ORDER — BUPIVACAINE HYDROCHLORIDE 2.5 MG/ML
INJECTION, SOLUTION EPIDURAL; INFILTRATION; INTRACAUDAL
Status: DISCONTINUED | OUTPATIENT
Start: 2018-05-24 | End: 2018-05-24 | Stop reason: HOSPADM

## 2018-05-24 RX ORDER — NEOSTIGMINE METHYLSULFATE 1 MG/ML
INJECTION, SOLUTION INTRAVENOUS
Status: DISCONTINUED | OUTPATIENT
Start: 2018-05-24 | End: 2018-05-24

## 2018-05-24 RX ORDER — SODIUM CHLORIDE, SODIUM LACTATE, POTASSIUM CHLORIDE, CALCIUM CHLORIDE 600; 310; 30; 20 MG/100ML; MG/100ML; MG/100ML; MG/100ML
INJECTION, SOLUTION INTRAVENOUS CONTINUOUS PRN
Status: DISCONTINUED | OUTPATIENT
Start: 2018-05-24 | End: 2018-05-24

## 2018-05-24 RX ADMIN — ROCURONIUM BROMIDE 10 MG: 10 INJECTION, SOLUTION INTRAVENOUS at 11:05

## 2018-05-24 RX ADMIN — ROCURONIUM BROMIDE 5 MG: 10 INJECTION, SOLUTION INTRAVENOUS at 11:05

## 2018-05-24 RX ADMIN — FENTANYL CITRATE 50 MCG: 50 INJECTION, SOLUTION INTRAMUSCULAR; INTRAVENOUS at 11:05

## 2018-05-24 RX ADMIN — ROBINUL 0.4 MG: 0.2 INJECTION INTRAMUSCULAR; INTRAVENOUS at 11:05

## 2018-05-24 RX ADMIN — FENTANYL CITRATE 100 MCG: 50 INJECTION, SOLUTION INTRAMUSCULAR; INTRAVENOUS at 10:05

## 2018-05-24 RX ADMIN — ROCURONIUM BROMIDE 25 MG: 10 INJECTION, SOLUTION INTRAVENOUS at 11:05

## 2018-05-24 RX ADMIN — PROMETHAZINE HYDROCHLORIDE 6.25 MG: 25 INJECTION INTRAMUSCULAR; INTRAVENOUS at 12:05

## 2018-05-24 RX ADMIN — ONDANSETRON 4 MG: 2 INJECTION, SOLUTION INTRAMUSCULAR; INTRAVENOUS at 11:05

## 2018-05-24 RX ADMIN — CHLORHEXIDINE GLUCONATE 10 ML: 1.2 RINSE ORAL at 08:05

## 2018-05-24 RX ADMIN — PROPOFOL 50 MG: 10 INJECTION, EMULSION INTRAVENOUS at 11:05

## 2018-05-24 RX ADMIN — ACETAMINOPHEN 1000 MG: 10 INJECTION, SOLUTION INTRAVENOUS at 11:05

## 2018-05-24 RX ADMIN — NEOSTIGMINE METHYLSULFATE 4 MG: 1 INJECTION INTRAVENOUS at 11:05

## 2018-05-24 RX ADMIN — CEFAZOLIN SODIUM 2 G: 1 SOLUTION INTRAVENOUS at 10:05

## 2018-05-24 RX ADMIN — PROPOFOL 150 MG: 10 INJECTION, EMULSION INTRAVENOUS at 10:05

## 2018-05-24 RX ADMIN — MIDAZOLAM 2 MG: 1 INJECTION INTRAMUSCULAR; INTRAVENOUS at 10:05

## 2018-05-24 RX ADMIN — LIDOCAINE HYDROCHLORIDE 60 MG: 10 INJECTION, SOLUTION INFILTRATION; PERINEURAL at 10:05

## 2018-05-24 RX ADMIN — AMPICILLIN SODIUM AND SULBACTAM SODIUM 3 G: 2; 1 INJECTION, POWDER, FOR SOLUTION INTRAMUSCULAR; INTRAVENOUS at 04:05

## 2018-05-24 RX ADMIN — DEXAMETHASONE SODIUM PHOSPHATE 4 MG: 4 INJECTION, SOLUTION INTRA-ARTICULAR; INTRALESIONAL; INTRAMUSCULAR; INTRAVENOUS; SOFT TISSUE at 11:05

## 2018-05-24 RX ADMIN — MORPHINE SULFATE 2 MG: 4 INJECTION INTRAVENOUS at 12:05

## 2018-05-24 RX ADMIN — SUCCINYLCHOLINE CHLORIDE 100 MG: 20 INJECTION, SOLUTION INTRAMUSCULAR; INTRAVENOUS at 11:05

## 2018-05-24 RX ADMIN — ROBINUL 0.2 MG: 0.2 INJECTION INTRAMUSCULAR; INTRAVENOUS at 10:05

## 2018-05-24 RX ADMIN — ROCURONIUM BROMIDE 5 MG: 10 INJECTION, SOLUTION INTRAVENOUS at 10:05

## 2018-05-24 RX ADMIN — MEPERIDINE HYDROCHLORIDE 12.5 MG: 50 INJECTION INTRAMUSCULAR; INTRAVENOUS; SUBCUTANEOUS at 12:05

## 2018-05-24 RX ADMIN — CEFAZOLIN SODIUM 2 G: 1 SOLUTION INTRAVENOUS at 03:05

## 2018-05-24 RX ADMIN — BUPIVACAINE HYDROCHLORIDE 30 ML: 2.5 INJECTION, SOLUTION EPIDURAL; INFILTRATION; INTRACAUDAL; PERINEURAL at 11:05

## 2018-05-24 RX ADMIN — HYDROCODONE BITARTRATE AND ACETAMINOPHEN 1 TABLET: 10; 325 TABLET ORAL at 08:05

## 2018-05-24 RX ADMIN — SODIUM CHLORIDE: 0.9 INJECTION, SOLUTION INTRAVENOUS at 10:05

## 2018-05-24 RX ADMIN — STANDARDIZED SENNA CONCENTRATE AND DOCUSATE SODIUM 1 TABLET: 8.6; 5 TABLET, FILM COATED ORAL at 08:05

## 2018-05-24 RX ADMIN — METOCLOPRAMIDE 10 MG: 5 INJECTION, SOLUTION INTRAMUSCULAR; INTRAVENOUS at 12:05

## 2018-05-24 RX ADMIN — NICOTINE 1 PATCH: 21 PATCH, EXTENDED RELEASE TRANSDERMAL at 01:05

## 2018-05-24 RX ADMIN — SODIUM CHLORIDE, SODIUM LACTATE, POTASSIUM CHLORIDE, AND CALCIUM CHLORIDE: 600; 310; 30; 20 INJECTION, SOLUTION INTRAVENOUS at 10:05

## 2018-05-24 RX ADMIN — BUPROPION HYDROCHLORIDE 150 MG: 150 TABLET, FILM COATED, EXTENDED RELEASE ORAL at 02:05

## 2018-05-24 NOTE — ASSESSMENT & PLAN NOTE
Admit to obs  IV fluids, NPO  IV antibiotics  Plan for appendectomy (open vs laparoscopic) today. The procedure was discussed in detail, including risks and alternatives. The patient voices understanding and all questions were answered. The patient agrees to proceed as planned.

## 2018-05-24 NOTE — SUBJECTIVE & OBJECTIVE
No current facility-administered medications on file prior to encounter.      Current Outpatient Prescriptions on File Prior to Encounter   Medication Sig    buPROPion (WELLBUTRIN XL) 150 MG TB24 tablet Take 1 tablet (150 mg total) by mouth once daily.    clonazePAM (KLONOPIN) 1 MG tablet Take 1 tablet (1 mg total) by mouth 2 (two) times daily as needed for Anxiety.    sildenafil (VIAGRA) 100 MG tablet Take 1 tablet (100 mg total) by mouth daily as needed for Erectile Dysfunction.       Review of patient's allergies indicates:  No Known Allergies    Past Medical History:   Diagnosis Date    Anxiety     Hyperlipidemia      Past Surgical History:   Procedure Laterality Date    FRACTURE SURGERY      left leg pins an screws      Family History     Problem Relation (Age of Onset)    Heart disease Father        Social History Main Topics    Smoking status: Current Every Day Smoker     Packs/day: 1.00     Years: 17.00    Smokeless tobacco: Never Used    Alcohol use 1.0 oz/week     2 Standard drinks or equivalent per week      Comment: social    Drug use: No    Sexual activity: Yes     Partners: Female     Birth control/ protection: None     Review of Systems   Constitutional: Negative for activity change, appetite change, chills and fever.   Respiratory: Negative for shortness of breath.    Cardiovascular: Negative for chest pain.   Gastrointestinal: Positive for abdominal pain and nausea. Negative for vomiting.   Genitourinary: Negative for dysuria.   Musculoskeletal: Negative for myalgias.   Skin: Negative for wound.   Neurological: Negative for weakness.   Hematological: Does not bruise/bleed easily.   Psychiatric/Behavioral: The patient is not nervous/anxious.      Objective:     Vital Signs (Most Recent):  Temp: 98.2 °F (36.8 °C) (05/24/18 0736)  Pulse: (!) 53 (05/24/18 0736)  Resp: 18 (05/24/18 0736)  BP: (!) 124/58 (05/24/18 0736)  SpO2: 96 % (05/24/18 0736) Vital Signs (24h Range):  Temp:  [97.5 °F (36.4  °C)-99.1 °F (37.3 °C)] 98.2 °F (36.8 °C)  Pulse:  [48-77] 53  Resp:  [16-20] 18  SpO2:  [91 %-97 %] 96 %  BP: (108-151)/(57-82) 124/58     Weight: 98 kg (216 lb 0.8 oz)  Body mass index is 29.3 kg/m².    Physical Exam   Constitutional: He is oriented to person, place, and time. He appears well-developed and well-nourished.   HENT:   Head: Normocephalic and atraumatic.   Eyes: EOM are normal.   Cardiovascular: Normal rate and regular rhythm.    Pulmonary/Chest: Effort normal and breath sounds normal. No respiratory distress.   Abdominal: Soft. He exhibits no distension. There is tenderness (RLQ ). There is no rebound and no guarding.   Musculoskeletal: Normal range of motion.   Neurological: He is alert and oriented to person, place, and time.   Skin: Skin is warm and dry.   Psychiatric: He has a normal mood and affect. Thought content normal.   Vitals reviewed.      Significant Labs:  CBC:   Recent Labs  Lab 05/24/18  0443   WBC 10.19   RBC 4.45*   HGB 14.6   HCT 43.2      MCV 97   MCH 32.8*   MCHC 33.8     CMP:   Recent Labs  Lab 05/23/18  1759 05/24/18  0443   GLU 90 89   CALCIUM 9.5 9.0   ALBUMIN 4.0  --    PROT 7.4  --     140   K 4.0 4.0   CO2 23 25    107   BUN 12 9   CREATININE 1.1 1.0   ALKPHOS 49*  --    ALT 24  --    AST 20  --    BILITOT 0.7  --        Significant Diagnostics:  I have reviewed all pertinent imaging results/findings within the past 24 hours.

## 2018-05-24 NOTE — PROGRESS NOTES
Pt back from surgery, resting at this time with no complaints. Gauze to RL abdomen CDI. Will continue to monitor.

## 2018-05-24 NOTE — ANESTHESIA POSTPROCEDURE EVALUATION
Anesthesia Post Evaluation    Patient: Manjinder Witt    Procedure(s) Performed: Procedure(s) (LRB):  APPENDECTOMY (N/A)    Final Anesthesia Type: general  Patient location during evaluation: PACU  Patient participation: Yes- Able to Participate  Level of consciousness: awake and alert  Post-procedure vital signs: reviewed and stable  Pain management: adequate  Airway patency: patent  PONV status at discharge: No PONV  Anesthetic complications: no      Cardiovascular status: blood pressure returned to baseline  Respiratory status: unassisted  Hydration status: euvolemic  Follow-up not needed.        Visit Vitals  /69 (BP Location: Right arm, Patient Position: Lying)   Pulse (!) 52   Temp 36.5 °C (97.7 °F) (Oral)   Resp 16   Ht 6' (1.829 m)   Wt 98 kg (216 lb 0.8 oz)   SpO2 95%   BMI 29.30 kg/m²       Pain/Gio Score: Pain Assessment Performed: Yes (5/24/2018  1:46 PM)  Presence of Pain: non-verbal indicators absent (5/24/2018  1:00 PM)  Pain Rating Prior to Med Admin: 4 (5/24/2018 12:50 PM)  Pain Rating Post Med Admin: 3 (5/24/2018  1:10 PM)  Gio Score: 8 (5/24/2018  1:00 PM)

## 2018-05-24 NOTE — HPI
Manjinder Witt is a 42 y.o. male who presented to the ED yesterday evening c/o abdominal pain. Pain began in epigastric area and gradually moved to RLQ. He c/o associated nausea and denies fever, chills, or emesis. CT confirms acute appendicitis. WBC 19k in ED

## 2018-05-24 NOTE — TRANSFER OF CARE
Anesthesia Transfer of Care Note    Patient: Manjinder Witt    Procedure(s) Performed: Procedure(s) (LRB):  APPENDECTOMY (N/A)    Patient location: PACU    Anesthesia Type: general    Transport from OR: Transported from OR on room air with adequate spontaneous ventilation    Post pain: adequate analgesia    Post assessment: no apparent anesthetic complications and tolerated procedure well    Post vital signs: stable    Level of consciousness: awake    Nausea/Vomiting: no nausea/vomiting    Complications: none    Transfer of care protocol was followed      Last vitals:   Visit Vitals  BP (!) 124/58 (BP Location: Right arm, Patient Position: Lying)   Pulse (!) 53   Temp 36.8 °C (98.2 °F) (Oral)   Resp 18   Ht 6' (1.829 m)   Wt 98 kg (216 lb 0.8 oz)   SpO2 96%   BMI 29.30 kg/m²

## 2018-05-24 NOTE — PLAN OF CARE
Ambulated in room and to stretcher without difficulty. Escorted to preop via stretcher and accompanied by rn and his mom.

## 2018-05-24 NOTE — ANESTHESIA PREPROCEDURE EVALUATION
05/24/2018  Manjinder Witt is a 42 y.o., male.    Anesthesia Evaluation    I have reviewed the Patient Summary Reports.    I have reviewed the Nursing Notes.   I have reviewed the Medications.     Review of Systems  Anesthesia Hx:  No problems with previous Anesthesia  Denies Family Hx of Anesthesia complications.   Denies Personal Hx of Anesthesia complications.   Social:  Smoker    Cardiovascular:  Cardiovascular Normal     Pulmonary:  Pulmonary Normal    Neurological:  Neurology Normal    Endocrine:  Endocrine Normal        Physical Exam  General:  Well nourished    Airway/Jaw/Neck:  Airway Findings: Tongue: Normal Mallampati: I      Dental:  DENTAL FINDINGS: Normal   Chest/Lungs:  Chest/Lungs Findings: Normal Respiratory Rate     Heart/Vascular:  Heart Findings: Normal            Anesthesia Plan  Type of Anesthesia, risks & benefits discussed:  Anesthesia Type:  general  Patient's Preference:   Intra-op Monitoring Plan:   Intra-op Monitoring Plan Comments:   Post Op Pain Control Plan:   Post Op Pain Control Plan Comments:   Induction:   IV  Beta Blocker:  Patient is not currently on a Beta-Blocker (No further documentation required).       Informed Consent: Patient understands risks and agrees with Anesthesia plan.  Questions answered. Anesthesia consent signed with patient.  ASA Score: 2     Day of Surgery Review of History & Physical: I have interviewed and examined the patient. I have reviewed the patient's H&P dated:  There are no significant changes.          Ready For Surgery From Anesthesia Perspective.

## 2018-05-24 NOTE — PLAN OF CARE
Problem: Patient Care Overview  Goal: Plan of Care Review  Outcome: Ongoing (interventions implemented as appropriate)  Plan of care discussed with patient aaox4 Vital signs stable afebrile free from falls no pain reported fluids and abx given tolerated well fabiano hose on npo for appendectomy this am will cont to monitor.

## 2018-05-24 NOTE — H&P
[facility]  General Surgery  History & Physical    Patient Name: Manjinder Witt  MRN: [id number]  Admission Date: 5/23/2018  Attending Physician: Karl Dillard MD   Primary Care Provider: Opal Lara MD    Patient information was obtained from patient and ER records.     Subjective:     Chief Complaint/Reason for Admission: acute appendicitis    History of Present Illness: Manjinder Witt is a 42 y.o. male who presented to the ED yesterday evening c/o abdominal pain. Pain began in epigastric area and gradually moved to RLQ. He c/o associated nausea and denies fever, chills, or emesis. CT confirms acute appendicitis. WBC 19k in ED    No current facility-administered medications on file prior to encounter.      Current Outpatient Prescriptions on File Prior to Encounter   Medication Sig    buPROPion (WELLBUTRIN XL) 150 MG TB24 tablet Take 1 tablet (150 mg total) by mouth once daily.    clonazePAM (KLONOPIN) 1 MG tablet Take 1 tablet (1 mg total) by mouth 2 (two) times daily as needed for Anxiety.    sildenafil (VIAGRA) 100 MG tablet Take 1 tablet (100 mg total) by mouth daily as needed for Erectile Dysfunction.       Review of patient's allergies indicates:  No Known Allergies    Past Medical History:   Diagnosis Date    Anxiety     Hyperlipidemia      Past Surgical History:   Procedure Laterality Date    FRACTURE SURGERY      left leg pins an screws      Family History     Problem Relation (Age of Onset)    Heart disease Father        Social History Main Topics    Smoking status: Current Every Day Smoker     Packs/day: 1.00     Years: 17.00    Smokeless tobacco: Never Used    Alcohol use 1.0 oz/week     2 Standard drinks or equivalent per week      Comment: social    Drug use: No    Sexual activity: Yes     Partners: Female     Birth control/ protection: None     Review of Systems   Constitutional: Negative for activity change, appetite change, chills and fever.   Respiratory: Negative for  shortness of breath.    Cardiovascular: Negative for chest pain.   Gastrointestinal: Positive for abdominal pain and nausea. Negative for vomiting.   Genitourinary: Negative for dysuria.   Musculoskeletal: Negative for myalgias.   Skin: Negative for wound.   Neurological: Negative for weakness.   Hematological: Does not bruise/bleed easily.   Psychiatric/Behavioral: The patient is not nervous/anxious.      Objective:     Vital Signs (Most Recent):  Temp: 98.2 °F (36.8 °C) (05/24/18 0736)  Pulse: (!) 53 (05/24/18 0736)  Resp: 18 (05/24/18 0736)  BP: (!) 124/58 (05/24/18 0736)  SpO2: 96 % (05/24/18 0736) Vital Signs (24h Range):  Temp:  [97.5 °F (36.4 °C)-99.1 °F (37.3 °C)] 98.2 °F (36.8 °C)  Pulse:  [48-77] 53  Resp:  [16-20] 18  SpO2:  [91 %-97 %] 96 %  BP: (108-151)/(57-82) 124/58     Weight: 98 kg (216 lb 0.8 oz)  Body mass index is 29.3 kg/m².    Physical Exam   Constitutional: He is oriented to person, place, and time. He appears well-developed and well-nourished.   HENT:   Head: Normocephalic and atraumatic.   Eyes: EOM are normal.   Cardiovascular: Normal rate and regular rhythm.    Pulmonary/Chest: Effort normal and breath sounds normal. No respiratory distress.   Abdominal: Soft. He exhibits no distension. There is tenderness (RLQ ). There is no rebound and no guarding.   Musculoskeletal: Normal range of motion.   Neurological: He is alert and oriented to person, place, and time.   Skin: Skin is warm and dry.   Psychiatric: He has a normal mood and affect. Thought content normal.   Vitals reviewed.      Significant Labs:  CBC:   Recent Labs  Lab 05/24/18  0443   WBC 10.19   RBC 4.45*   HGB 14.6   HCT 43.2      MCV 97   MCH 32.8*   MCHC 33.8     CMP:   Recent Labs  Lab 05/23/18  1759 05/24/18  0443   GLU 90 89   CALCIUM 9.5 9.0   ALBUMIN 4.0  --    PROT 7.4  --     140   K 4.0 4.0   CO2 23 25    107   BUN 12 9   CREATININE 1.1 1.0   ALKPHOS 49*  --    ALT 24  --    AST 20  --    BILITOT 0.7   --        Significant Diagnostics:  I have reviewed all pertinent imaging results/findings within the past 24 hours.    Assessment/Plan:     * Acute appendicitis    Admit to obs  IV fluids, NPO  IV antibiotics  Plan for appendectomy (open vs laparoscopic) today. The procedure was discussed in detail, including risks and alternatives. The patient voices understanding and all questions were answered. The patient agrees to proceed as planned.            VTE Risk Mitigation         Ordered     IP VTE LOW RISK PATIENT  Once      05/23/18 2302     Place BELLO hose  Until discontinued      05/23/18 2302          Natasha Muhammad PA-C  General Surgery  [facility]

## 2018-05-24 NOTE — PLAN OF CARE
Problem: Patient Care Overview  Goal: Plan of Care Review  Outcome: Ongoing (interventions implemented as appropriate)  Pt free of falls during shift. VSS.  PIV intact and infusing per orders. Pt on 2L NC (post sedation), no SOB noted. Pt can have clear liquid diet advance as tolerated once he wakes up. Call light in reach, hourly rounding made, family at bedside, will continue to monitor.

## 2018-05-24 NOTE — INTERVAL H&P NOTE
The patient has been examined and the H&P has been reviewed:    I concur with the findings and no changes have occurred since H&P was written.    Anesthesia/Surgery risks, benefits and alternative options discussed and understood by patient/family.          Active Hospital Problems    Diagnosis  POA    *Acute appendicitis [K35.80]  Yes      Resolved Hospital Problems    Diagnosis Date Resolved POA   No resolved problems to display.

## 2018-05-24 NOTE — OP NOTE
Operative Note       SURGERY DATE:  05/24/2018    PRE-OP DIAGNOSIS:  Acute appendicitis, unspecified acute appendicitis type [K35.80]    POST-OP DIAGNOSIS:  Acute appendicitis, unspecified acute appendicitis type [K35.80]   Active Hospital Problems    Diagnosis  POA    *Acute appendicitis [K35.80]  Yes      Resolved Hospital Problems    Diagnosis Date Resolved POA   No resolved problems to display.       Procedure(s) (LRB):  APPENDECTOMY (N/A)    Surgeon(s) and Role:     * Jericho Kessler MD - Primary    ASSISTANTS: None  ANESTHESIA: General    FINDINGS: Acutely inflamed appendix without perforation.    ESTIMATED BLOOD LOSS: 5 mL              COMPLICATIONS:  None    SPECIMEN:  appendix    Implants: None    INDICATION:  Acute appendicitis    DESCRIPTION OF PROCEDURE:    The patient was taken to the operating room after an informed consent was obtained for appendectomy. The risks, benefits and complications of appendectomy were discussed.  These included wound infection, bleeding, and abscess.   All question were answered.  The patient has agreed to proceed. The patient underwent general anesthesia with endotracheal intubation, and the patient was prepped and draped in the usual fashion. A Chino-Baljeet incision was made and further deepened into the cristian's fascia which was divided. The external oblique fascia also divided and the muscle was then retracted. The peritoneum was divided under direct vision, and gained access into the peritoneum. Digital exploration of the right lower quadrant was performed and delivered the appendix into the field. The mesoappendix was divided between clamps, and tied with 2-0 silk. After ligature at the base of appendix with 2-0 silk, the appendix was severed and removed. The stump of base was cauterized with electrocautery. After checking for hemostasis, the wound was irrigated and closed peritoneum in the usual fashion. The fascia was approximated with 0 Vicryl, and the skin approximate  in the usual fashion after local anesthetic of 0.25% Marcaine, total of 12 ml. The incision was steri-stripped. During the procedure, the patient tolerated and later awakened. The patient was taken to the recovery room.            CONDITION: Good    DISPOSITION: PACU - hemodynamically stable.     Jericho Kessler

## 2018-05-25 VITALS
DIASTOLIC BLOOD PRESSURE: 62 MMHG | WEIGHT: 218.06 LBS | HEART RATE: 65 BPM | OXYGEN SATURATION: 91 % | HEIGHT: 72 IN | RESPIRATION RATE: 18 BRPM | BODY MASS INDEX: 29.54 KG/M2 | SYSTOLIC BLOOD PRESSURE: 126 MMHG | TEMPERATURE: 99 F

## 2018-05-25 LAB
ERYTHROCYTE [DISTWIDTH] IN BLOOD BY AUTOMATED COUNT: 12.5 %
HCT VFR BLD AUTO: 42.1 %
HGB BLD-MCNC: 14.3 G/DL
MCH RBC QN AUTO: 32.8 PG
MCHC RBC AUTO-ENTMCNC: 34 G/DL
MCV RBC AUTO: 97 FL
PLATELET # BLD AUTO: 202 K/UL
PMV BLD AUTO: 10.3 FL
RBC # BLD AUTO: 4.36 M/UL
WBC # BLD AUTO: 12.98 K/UL

## 2018-05-25 PROCEDURE — 25000003 PHARM REV CODE 250: Performed by: SURGERY

## 2018-05-25 PROCEDURE — 94760 N-INVAS EAR/PLS OXIMETRY 1: CPT

## 2018-05-25 PROCEDURE — 36415 COLL VENOUS BLD VENIPUNCTURE: CPT

## 2018-05-25 PROCEDURE — 85027 COMPLETE CBC AUTOMATED: CPT

## 2018-05-25 RX ORDER — HYDROCODONE BITARTRATE AND ACETAMINOPHEN 10; 325 MG/1; MG/1
1 TABLET ORAL EVERY 6 HOURS PRN
Qty: 30 TABLET | Refills: 0 | Status: SHIPPED | OUTPATIENT
Start: 2018-05-25 | End: 2018-08-01

## 2018-05-25 RX ADMIN — BUPROPION HYDROCHLORIDE 150 MG: 150 TABLET, FILM COATED, EXTENDED RELEASE ORAL at 08:05

## 2018-05-25 RX ADMIN — CHLORHEXIDINE GLUCONATE 10 ML: 1.2 RINSE ORAL at 08:05

## 2018-05-25 RX ADMIN — STANDARDIZED SENNA CONCENTRATE AND DOCUSATE SODIUM 1 TABLET: 8.6; 5 TABLET, FILM COATED ORAL at 08:05

## 2018-05-25 RX ADMIN — HYDROCODONE BITARTRATE AND ACETAMINOPHEN 1 TABLET: 10; 325 TABLET ORAL at 05:05

## 2018-05-25 RX ADMIN — HYDROCODONE BITARTRATE AND ACETAMINOPHEN 1 TABLET: 10; 325 TABLET ORAL at 01:05

## 2018-05-25 NOTE — DISCHARGE SUMMARY
[facility]   General Surgery  Discharge Summary      Patient Name: Manjinder Witt  MRN: [id number]  Admission Date: 5/23/2018  Hospital Length of Stay: 1 days  Discharge Date and Time:  05/25/2018 8:46 AM  Attending Physician: Karl Dillard MD   Discharging Provider: Natasha Muhammad PA-C  Primary Care Provider: Opal Lara MD    HPI:   Manjinder Witt is a 42 y.o. male who presented to the ED yesterday evening c/o abdominal pain. Pain began in epigastric area and gradually moved to RLQ. He c/o associated nausea and denies fever, chills, or emesis. CT confirms acute appendicitis. WBC 19k in ED    Procedure(s) (LRB):  APPENDECTOMY (N/A)      Indwelling Lines/Drains at time of discharge:   Lines/Drains/Airways          No matching active lines, drains, or airways        Hospital Course: 05/24/2018 Feels better this morning. WBC normal. Appendectomy today   05/25/2018 POD1 s/p appendectomy. Doing well. Pain controlled. Afebrile. He was examined and found to be fit for discharge.     Consults:     Significant Diagnostic Studies: Labs:   CMP   Recent Labs  Lab 05/23/18  1759 05/24/18  0443    140   K 4.0 4.0    107   CO2 23 25   GLU 90 89   BUN 12 9   CREATININE 1.1 1.0   CALCIUM 9.5 9.0   PROT 7.4  --    ALBUMIN 4.0  --    BILITOT 0.7  --    ALKPHOS 49*  --    AST 20  --    ALT 24  --    ANIONGAP 11 8   ESTGFRAFRICA >60 >60   EGFRNONAA >60 >60    and CBC   Recent Labs  Lab 05/23/18  1759 05/24/18  0443 05/25/18  0454   WBC 19.49* 10.19 12.98*   HGB 16.0 14.6 14.3   HCT 45.3 43.2 42.1    203 202     Radiology: CT scan: CT ABDOMEN PELVIS WITH CONTRAST:   Results for orders placed or performed during the hospital encounter of 05/23/18   CT Abdomen Pelvis With Contrast    Narrative    EXAMINATION:  CT ABDOMEN PELVIS WITH CONTRAST    CLINICAL HISTORY:  RLQ abd pain;    TECHNIQUE:  Procedure performed with 75ml Omnipaque 350.    COMPARISON:  None    FINDINGS:  CT scan of the Abdomen with  contrast:    No abnormality of the liver, spleen, or pancreas is seen.    No abnormality of the gallbladder is seen.    No abnormality of the kidneys is seen.    The appendix measures 7 mm diameter and there is adjacent periappendiceal fat stranding suspicious for appendicitis.  No evidence of abscess or free air.  No free fluid.    No significant findings at the lung bases.      Impression    Evidence of appendicitis.    All CT scans at this facility use dose modulation, iterative reconstruction and/or weight based dosing when appropriate to reduce radiation dose to as low as reasonably achievable.      Electronically signed by: Naresh Hannah MD  Date:    05/23/2018  Time:    21:25    and CT ABDOMEN PELVIS WITHOUT CONTRAST: No results found for this visit on 05/23/18.    Pending Diagnostic Studies:     None        Final Active Diagnoses:    Diagnosis Date Noted POA    PRINCIPAL PROBLEM:  Acute appendicitis [K35.80] 05/23/2018 Yes      Problems Resolved During this Admission:    Diagnosis Date Noted Date Resolved POA      Discharged Condition: good    Disposition: Home or Self Care    Follow Up:  Follow-up Information     Natasha Muhammad PA-C In 3 weeks.    Specialty:  General Surgery  Why:  post op  Contact information:  [address] [place] [address] DR Iesha SAMANIEGO [id number] [phone]                 Patient Instructions:     Diet Adult Regular     Lifting restrictions     Notify your health care provider if you experience any of the following:  temperature >100.4     Notify your health care provider if you experience any of the following:  persistent nausea and vomiting or diarrhea     Notify your health care provider if you experience any of the following:  severe uncontrolled pain     Notify your health care provider if you experience any of the following:  redness, tenderness, or signs of infection (pain, swelling, redness, odor or green/yellow discharge around incision site)     Notify your health care provider if  you experience any of the following:  difficulty breathing or increased cough     Remove dressing in 24 hours       Medications:  Reconciled Home Medications:      Medication List      START taking these medications    HYDROcodone-acetaminophen  mg per tablet  Commonly known as:  NORCO  Take 1 tablet by mouth every 6 (six) hours as needed for Pain.        CONTINUE taking these medications    buPROPion 150 MG TB24 tablet  Commonly known as:  WELLBUTRIN XL  Take 1 tablet (150 mg total) by mouth once daily.     clonazePAM 1 MG tablet  Commonly known as:  KLONOPIN  Take 1 tablet (1 mg total) by mouth 2 (two) times daily as needed for Anxiety.     sildenafil 100 MG tablet  Commonly known as:  VIAGRA  Take 1 tablet (100 mg total) by mouth daily as needed for Erectile Dysfunction.          Time spent on the discharge of patient: 25 minutes    Natasha Muhammad PA-C  General Surgery  [facility]

## 2018-05-25 NOTE — PLAN OF CARE
Problem: Patient Care Overview  Goal: Plan of Care Review  Outcome: Outcome(s) achieved Date Met: 05/25/18  Discharged. Remained free from injury. Discharge instructions given. RX delivered to bedside.  Verbalized understanding. IV removed. Cath tip intact. Off floor via wheelchair accompanied by family and staff.

## 2018-06-13 ENCOUNTER — OFFICE VISIT (OUTPATIENT)
Dept: SURGERY | Facility: CLINIC | Age: 43
End: 2018-06-13
Payer: COMMERCIAL

## 2018-06-13 VITALS
BODY MASS INDEX: 29.57 KG/M2 | WEIGHT: 218 LBS | SYSTOLIC BLOOD PRESSURE: 118 MMHG | HEART RATE: 72 BPM | DIASTOLIC BLOOD PRESSURE: 73 MMHG | TEMPERATURE: 99 F

## 2018-06-13 DIAGNOSIS — Z98.890 POST-OPERATIVE STATE: Primary | ICD-10-CM

## 2018-06-13 DIAGNOSIS — K35.80 ACUTE APPENDICITIS, UNSPECIFIED ACUTE APPENDICITIS TYPE: ICD-10-CM

## 2018-06-13 PROCEDURE — 99024 POSTOP FOLLOW-UP VISIT: CPT | Mod: S$GLB,,, | Performed by: PHYSICIAN ASSISTANT

## 2018-06-13 PROCEDURE — 99999 PR PBB SHADOW E&M-EST. PATIENT-LVL III: CPT | Mod: PBBFAC,,, | Performed by: PHYSICIAN ASSISTANT

## 2018-06-14 PROBLEM — K35.80 ACUTE APPENDICITIS: Status: RESOLVED | Noted: 2018-05-23 | Resolved: 2018-06-14

## 2018-06-14 NOTE — PROGRESS NOTES
Manjinder Witt is status post appendectomy and presents today for follow-up care.  He is tolerating a regular diet and denies fevers, nausea or vomiting.    PE: Abdomen is soft, non-tender, non-distended.  Incisions clean, dry, and intact.    Pathology report was reviewed with the patient, which showed acute appendicitis.    A/P:  Normal post-operative course.    The patient is instructed to avoid heavy lifting until 2 weeks after the surgery date.  Return to clinic as needed.

## 2018-08-01 ENCOUNTER — OFFICE VISIT (OUTPATIENT)
Dept: FAMILY MEDICINE | Facility: CLINIC | Age: 43
End: 2018-08-01
Payer: COMMERCIAL

## 2018-08-01 VITALS
TEMPERATURE: 99 F | HEART RATE: 68 BPM | HEIGHT: 72 IN | WEIGHT: 216.5 LBS | SYSTOLIC BLOOD PRESSURE: 110 MMHG | BODY MASS INDEX: 29.32 KG/M2 | OXYGEN SATURATION: 95 % | DIASTOLIC BLOOD PRESSURE: 70 MMHG

## 2018-08-01 DIAGNOSIS — R22.1 LUMP IN THROAT: Primary | ICD-10-CM

## 2018-08-01 PROCEDURE — 99213 OFFICE O/P EST LOW 20 MIN: CPT | Mod: S$GLB,,, | Performed by: FAMILY MEDICINE

## 2018-08-01 PROCEDURE — 3008F BODY MASS INDEX DOCD: CPT | Mod: CPTII,S$GLB,, | Performed by: FAMILY MEDICINE

## 2018-08-01 PROCEDURE — 99999 PR PBB SHADOW E&M-EST. PATIENT-LVL III: CPT | Mod: PBBFAC,,, | Performed by: FAMILY MEDICINE

## 2018-08-01 NOTE — PROGRESS NOTES
Chief Complaint:    Chief Complaint   Patient presents with    Nodule       History of Present Illness:    Patient is here to have the swelling in the neck checked C says not grown in size nor is it painful.    ROS:  Review of Systems    Past Medical History:   Diagnosis Date    Anxiety     Hyperlipidemia        Social History:  Social History     Social History    Marital status:      Spouse name: N/A    Number of children: N/A    Years of education: N/A     Social History Main Topics    Smoking status: Current Every Day Smoker     Packs/day: 1.00     Years: 17.00    Smokeless tobacco: Never Used    Alcohol use 1.0 oz/week     2 Standard drinks or equivalent per week      Comment: social    Drug use: No    Sexual activity: Yes     Partners: Female     Birth control/ protection: None     Other Topics Concern    None     Social History Narrative    None       Family History:   family history includes Heart disease in his father.    Health Maintenance   Topic Date Due    Pneumococcal PPSV23 (Medium Risk) (1) 07/12/1992    Influenza Vaccine  08/01/2018    Lipid Panel  10/09/2018    TETANUS VACCINE  08/25/2026       Physical Exam:    Vital Signs  Temp: 98.8 °F (37.1 °C)  Temp src: Tympanic  Pulse: 68  SpO2: 95 %  BP: 110/70  BP Location: Left arm  Patient Position: Sitting  Pain Score: 0-No pain  Height and Weight  Height: 6' (182.9 cm)  Weight: 98.2 kg (216 lb 7.9 oz)  BSA (Calculated - sq m): 2.23 sq meters  BMI (Calculated): 29.4  Weight in (lb) to have BMI = 25: 183.9]    Body mass index is 29.36 kg/m².    Physical Exam   HENT:   Head:           Results for orders placed or performed during the hospital encounter of 05/23/18   CBC W/ AUTO DIFFERENTIAL   Result Value Ref Range    WBC 19.49 (H) 3.90 - 12.70 K/uL    RBC 4.74 4.60 - 6.20 M/uL    Hemoglobin 16.0 14.0 - 18.0 g/dL    Hematocrit 45.3 40.0 - 54.0 %    MCV 96 82 - 98 fL    MCH 33.8 (H) 27.0 - 31.0 pg    MCHC 35.3 32.0 - 36.0 g/dL     RDW 12.7 11.5 - 14.5 %    Platelets 225 150 - 350 K/uL    MPV 10.5 9.2 - 12.9 fL    Gran # (ANC) 15.6 (H) 1.8 - 7.7 K/uL    Lymph # 2.4 1.0 - 4.8 K/uL    Mono # 1.4 (H) 0.3 - 1.0 K/uL    Eos # 0.1 0.0 - 0.5 K/uL    Baso # 0.03 0.00 - 0.20 K/uL    Gran% 79.8 (H) 38.0 - 73.0 %    Lymph% 12.4 (L) 18.0 - 48.0 %    Mono% 7.0 4.0 - 15.0 %    Eosinophil% 0.6 0.0 - 8.0 %    Basophil% 0.2 0.0 - 1.9 %    Differential Method Automated    Comp. Metabolic Panel   Result Value Ref Range    Sodium 139 136 - 145 mmol/L    Potassium 4.0 3.5 - 5.1 mmol/L    Chloride 105 95 - 110 mmol/L    CO2 23 23 - 29 mmol/L    Glucose 90 70 - 110 mg/dL    BUN, Bld 12 6 - 20 mg/dL    Creatinine 1.1 0.5 - 1.4 mg/dL    Calcium 9.5 8.7 - 10.5 mg/dL    Total Protein 7.4 6.0 - 8.4 g/dL    Albumin 4.0 3.5 - 5.2 g/dL    Total Bilirubin 0.7 0.1 - 1.0 mg/dL    Alkaline Phosphatase 49 (L) 55 - 135 U/L    AST 20 10 - 40 U/L    ALT 24 10 - 44 U/L    Anion Gap 11 8 - 16 mmol/L    eGFR if African American >60 >60 mL/min/1.73 m^2    eGFR if non African American >60 >60 mL/min/1.73 m^2   Lipase   Result Value Ref Range    Lipase 30 4 - 60 U/L   Urinalysis - Clean Catch   Result Value Ref Range    Specimen UA Urine, Clean Catch     Color, UA Yellow Yellow, Straw, Jackeline    Appearance, UA Clear Clear    pH, UA 6.0 5.0 - 8.0    Specific Gravity, UA >=1.030 (A) 1.005 - 1.030    Protein, UA Negative Negative    Glucose, UA Negative Negative    Ketones, UA Negative Negative    Bilirubin (UA) Negative Negative    Occult Blood UA Negative Negative    Nitrite, UA Negative Negative    Urobilinogen, UA Negative <2.0 EU/dL    Leukocytes, UA Negative Negative   Procalcitonin   Result Value Ref Range    Procalcitonin 0.02 <0.25 ng/mL   Basic metabolic panel   Result Value Ref Range    Sodium 140 136 - 145 mmol/L    Potassium 4.0 3.5 - 5.1 mmol/L    Chloride 107 95 - 110 mmol/L    CO2 25 23 - 29 mmol/L    Glucose 89 70 - 110 mg/dL    BUN, Bld 9 6 - 20 mg/dL    Creatinine 1.0 0.5  - 1.4 mg/dL    Calcium 9.0 8.7 - 10.5 mg/dL    Anion Gap 8 8 - 16 mmol/L    eGFR if African American >60 >60 mL/min/1.73 m^2    eGFR if non African American >60 >60 mL/min/1.73 m^2   CBC auto differential   Result Value Ref Range    WBC 10.19 3.90 - 12.70 K/uL    RBC 4.45 (L) 4.60 - 6.20 M/uL    Hemoglobin 14.6 14.0 - 18.0 g/dL    Hematocrit 43.2 40.0 - 54.0 %    MCV 97 82 - 98 fL    MCH 32.8 (H) 27.0 - 31.0 pg    MCHC 33.8 32.0 - 36.0 g/dL    RDW 12.9 11.5 - 14.5 %    Platelets 203 150 - 350 K/uL    MPV 10.4 9.2 - 12.9 fL    Gran # (ANC) 6.2 1.8 - 7.7 K/uL    Lymph # 2.9 1.0 - 4.8 K/uL    Mono # 0.9 0.3 - 1.0 K/uL    Eos # 0.1 0.0 - 0.5 K/uL    Baso # 0.02 0.00 - 0.20 K/uL    Gran% 61.2 38.0 - 73.0 %    Lymph% 28.8 18.0 - 48.0 %    Mono% 8.7 4.0 - 15.0 %    Eosinophil% 1.1 0.0 - 8.0 %    Basophil% 0.2 0.0 - 1.9 %    Differential Method Automated    Hematology Profile   Result Value Ref Range    WBC 12.98 (H) 3.90 - 12.70 K/uL    RBC 4.36 (L) 4.60 - 6.20 M/uL    Hemoglobin 14.3 14.0 - 18.0 g/dL    Hematocrit 42.1 40.0 - 54.0 %    MCV 97 82 - 98 fL    MCH 32.8 (H) 27.0 - 31.0 pg    MCHC 34.0 32.0 - 36.0 g/dL    RDW 12.5 11.5 - 14.5 %    Platelets 202 150 - 350 K/uL    MPV 10.3 9.2 - 12.9 fL         Lab Results   Component Value Date    HGBA1C 5.0 10/09/2017       Assessment:      ICD-10-CM ICD-9-CM   1. Lump in throat R22.1 784.2         Plan:    Will schedule an ultrasound      Orders Placed This Encounter   Procedures    US Soft Tissue Head Neck Thyroid       Current Outpatient Prescriptions   Medication Sig Dispense Refill    buPROPion (WELLBUTRIN XL) 150 MG TB24 tablet Take 1 tablet (150 mg total) by mouth once daily. 30 tablet 11    clonazePAM (KLONOPIN) 1 MG tablet Take 1 tablet (1 mg total) by mouth 2 (two) times daily as needed for Anxiety. 21 tablet 3    sildenafil (VIAGRA) 100 MG tablet Take 1 tablet (100 mg total) by mouth daily as needed for Erectile Dysfunction. 10 tablet 12     No current  facility-administered medications for this visit.        Medications Discontinued During This Encounter   Medication Reason    HYDROcodone-acetaminophen (NORCO)  mg per tablet Patient no longer taking       No Follow-up on file.      Opal Lara MD

## 2018-08-09 ENCOUNTER — HOSPITAL ENCOUNTER (OUTPATIENT)
Dept: RADIOLOGY | Facility: HOSPITAL | Age: 43
Discharge: HOME | End: 2018-08-09
Attending: FAMILY MEDICINE
Payer: COMMERCIAL

## 2018-08-09 DIAGNOSIS — R22.1 LUMP IN THROAT: ICD-10-CM

## 2018-08-09 PROCEDURE — 76536 US EXAM OF HEAD AND NECK: CPT | Mod: TC

## 2018-08-14 ENCOUNTER — TELEPHONE (OUTPATIENT)
Dept: FAMILY MEDICINE | Facility: CLINIC | Age: 43
End: 2018-08-14

## 2018-08-14 DIAGNOSIS — E66.9 EXCESSIVE SUBCUTANEOUS FAT: Primary | ICD-10-CM

## 2018-08-20 ENCOUNTER — OFFICE VISIT (OUTPATIENT)
Dept: OTOLARYNGOLOGY | Facility: CLINIC | Age: 43
End: 2018-08-20
Payer: COMMERCIAL

## 2018-08-20 VITALS
SYSTOLIC BLOOD PRESSURE: 112 MMHG | HEART RATE: 69 BPM | DIASTOLIC BLOOD PRESSURE: 71 MMHG | TEMPERATURE: 99 F | BODY MASS INDEX: 29.42 KG/M2 | WEIGHT: 216.94 LBS

## 2018-08-20 DIAGNOSIS — R22.1 NECK MASS: Primary | ICD-10-CM

## 2018-08-20 PROCEDURE — 99244 OFF/OP CNSLTJ NEW/EST MOD 40: CPT | Mod: S$GLB,,, | Performed by: ORTHOPAEDIC SURGERY

## 2018-08-20 PROCEDURE — 99999 PR PBB SHADOW E&M-EST. PATIENT-LVL III: CPT | Mod: PBBFAC,,, | Performed by: ORTHOPAEDIC SURGERY

## 2018-08-20 RX ORDER — SULFAMETHOXAZOLE AND TRIMETHOPRIM 800; 160 MG/1; MG/1
1 TABLET ORAL 2 TIMES DAILY
Qty: 20 TABLET | Refills: 0 | Status: SHIPPED | OUTPATIENT
Start: 2018-08-20 | End: 2018-08-30

## 2018-08-20 NOTE — PROGRESS NOTES
Subjective:       Patient ID: Manjinder Witt is a 43 y.o. male.    Chief Complaint: Ecessive subcutaneous fat near throat/neck area    Patient is a very pleasant 43 year old gentleman here to see me today in consultation at the request of Dr. Lara for evaluation of a neck mass.  He says that it has been present for at least three months, and has not significantly grown or changed in size.  He denies any pain or drainage from the area, and it has not had any overlying erythema.  He does not smoke.  He has no difficulty swallowing, throat pain, or shortness of breath.  He denies any neck pain.      Review of Systems   Constitutional: Negative for fatigue, fever and unexpected weight change.   HENT: Negative for congestion, ear discharge, ear pain, facial swelling, hearing loss, nosebleeds, postnasal drip, rhinorrhea, sinus pressure, sneezing, sore throat, tinnitus, trouble swallowing and voice change.    Eyes: Negative for discharge, redness and itching.   Respiratory: Negative for cough, choking, shortness of breath and wheezing.    Cardiovascular: Negative for chest pain and palpitations.   Gastrointestinal: Negative for abdominal pain.        No reflux.   Musculoskeletal: Negative for neck pain.   Neurological: Negative for dizziness, facial asymmetry, light-headedness and headaches.   Hematological: Negative for adenopathy. Does not bruise/bleed easily.   Psychiatric/Behavioral: Negative for agitation, behavioral problems, confusion and decreased concentration.       Objective:      Physical Exam   Constitutional: He is oriented to person, place, and time. Vital signs are normal. He appears well-developed and well-nourished. No distress.   HENT:   Head: Normocephalic and atraumatic.   Right Ear: Hearing, tympanic membrane, external ear and ear canal normal.   Left Ear: Hearing, tympanic membrane, external ear and ear canal normal.   Nose: Nose normal. No mucosal edema, rhinorrhea, nasal deformity or septal  deviation.   Mouth/Throat: Uvula is midline, oropharynx is clear and moist and mucous membranes are normal. No trismus in the jaw. Normal dentition. No uvula swelling. No oropharyngeal exudate or posterior oropharyngeal edema.   1.5 cm soft cystic lesion to the left of midline over the superior thyroid cartilage, still in the area of hair bearing skin, mobile, no overlying erythema   Eyes: Conjunctivae and EOM are normal. Pupils are equal, round, and reactive to light. Right eye exhibits no chemosis. Left eye exhibits no chemosis. Right conjunctiva is not injected. Left conjunctiva is not injected. No scleral icterus.   Neck: Trachea normal and phonation normal. No tracheal tenderness present. No tracheal deviation present. No thyroid mass and no thyromegaly present.   Cardiovascular: Intact distal pulses.   Pulmonary/Chest: Effort normal. No accessory muscle usage or stridor. No respiratory distress.   Lymphadenopathy:        Head (right side): No submental, no submandibular, no preauricular and no posterior auricular adenopathy present.        Head (left side): No submental, no submandibular, no preauricular and no posterior auricular adenopathy present.     He has no cervical adenopathy.        Right cervical: No superficial cervical and no deep cervical adenopathy present.       Left cervical: No superficial cervical and no deep cervical adenopathy present.   Neurological: He is alert and oriented to person, place, and time. No cranial nerve deficit.   Skin: Skin is warm and dry. No rash noted. No erythema.   Psychiatric: He has a normal mood and affect. His behavior is normal. Thought content normal.       US NECK:    Right side: Right thyroid lobe measures 5.0 x 1.6 x 1.8 cm.  Homogeneous echotexture.  No solid or cystic mass.    Isthmus: Isthmus measures 0.8 cm.    Left side: Left thyroid lobe measures 5.1 x 1.7 x 1.6 cm.  Homogeneous echotexture.  No solid or cystic mass.    Area of palpable concern, is  located in the subcutaneous soft tissues of the left anterior neck.  The area of palpable concern, corresponds to an oval circumscribed 1.2 x 0.2 x 1.2 cm hypoechoic finding.  No vascularity associated with this finding.  This finding does not appear to correspond to a definite lymph node.  This may correspond to some heterogeneous focal fat or a complicated subcutaneous sebaceous cyst.      Assessment:       1. Neck mass        Plan:       1.  Neck mass:  Discussed that his exam and US is most consistent with a sebaceous cyst.  While it is in the area that could be consistent with a TGDC, it is not in the midline.  I would recommend a trial of Bactrim, and will have him return to clinic in one month for recheck with repeat US.  Call for sooner appointment if needed.      Thanks to Dr. Lara for the consult, report returned via Epic.

## 2018-08-20 NOTE — LETTER
August 24, 2018      Opal Lara MD  [address] [address] [address]           [facility]  [address] [place] [id number]  Phone: [phone]  Fax: [phone]          Patient: Manjinder Witt   MR Number: [id number]   YOB: 1975   Date of Visit: 8/20/2018       Dear Dr. Opal Lara:    Thank you for referring Manjinder Witt to me for evaluation. Attached you will find relevant portions of my assessment and plan of care.    If you have questions, please do not hesitate to call me. I look forward to following Manjinder Witt along with you.    Sincerely,    Trent Torrez    Enclosure  CC:  No Recipients    If you would like to receive this communication electronically, please contact [email or web address] or [phone] to request more information on [employer] access.    For providers and/or their staff who would like to refer a patient to [facility], please contact us through our one-stop-shop provider referral line, Maribell Guzman, at [phone].    If you feel you have received this communication in error or would no longer like to receive these types of communications, please e-mail [email or web address]

## 2018-09-19 ENCOUNTER — HOSPITAL ENCOUNTER (OUTPATIENT)
Dept: RADIOLOGY | Facility: HOSPITAL | Age: 43
Discharge: HOME | End: 2018-09-19
Attending: ORTHOPAEDIC SURGERY
Payer: COMMERCIAL

## 2018-09-19 ENCOUNTER — PATIENT MESSAGE (OUTPATIENT)
Dept: FAMILY MEDICINE | Facility: CLINIC | Age: 43
End: 2018-09-19

## 2018-09-19 ENCOUNTER — TELEPHONE (OUTPATIENT)
Dept: FAMILY MEDICINE | Facility: CLINIC | Age: 43
End: 2018-09-19

## 2018-09-19 DIAGNOSIS — R22.1 NECK MASS: ICD-10-CM

## 2018-09-19 DIAGNOSIS — Z00.00 ANNUAL PHYSICAL EXAM: Primary | ICD-10-CM

## 2018-09-19 PROCEDURE — 76536 US EXAM OF HEAD AND NECK: CPT | Mod: TC

## 2018-09-19 PROCEDURE — 76536 US EXAM OF HEAD AND NECK: CPT | Mod: 26,,, | Performed by: RADIOLOGY

## 2018-09-24 ENCOUNTER — OFFICE VISIT (OUTPATIENT)
Dept: OTOLARYNGOLOGY | Facility: CLINIC | Age: 43
End: 2018-09-24
Payer: COMMERCIAL

## 2018-09-24 VITALS
TEMPERATURE: 99 F | BODY MASS INDEX: 29.93 KG/M2 | SYSTOLIC BLOOD PRESSURE: 134 MMHG | HEART RATE: 68 BPM | WEIGHT: 220.69 LBS | DIASTOLIC BLOOD PRESSURE: 78 MMHG

## 2018-09-24 DIAGNOSIS — R22.1 NECK MASS: Primary | ICD-10-CM

## 2018-09-24 PROCEDURE — 99999 PR PBB SHADOW E&M-EST. PATIENT-LVL III: CPT | Mod: PBBFAC,,, | Performed by: ORTHOPAEDIC SURGERY

## 2018-09-24 PROCEDURE — 3008F BODY MASS INDEX DOCD: CPT | Mod: CPTII,S$GLB,, | Performed by: ORTHOPAEDIC SURGERY

## 2018-09-24 PROCEDURE — 99214 OFFICE O/P EST MOD 30 MIN: CPT | Mod: S$GLB,,, | Performed by: ORTHOPAEDIC SURGERY

## 2018-09-24 NOTE — PROGRESS NOTES
Subjective:       Patient ID: Manjinder Witt is a 43 y.o. male.    Chief Complaint: Follow-up (1 mth rtc, review ultrasound)    Patient is a very pleasant 43 year old gentleman here to see in followup for evaluation of a neck mass.  He says that it has been present for at least three months, and has not significantly grown or changed in size.  He denies any pain or drainage from the area, and it has not had any overlying erythema.  He does not smoke.  He has no difficulty swallowing, throat pain, or shortness of breath.  He denies any neck pain.  He has completed a round of antibiotics, no change in the mass.      Review of Systems   Constitutional: Negative for fatigue, fever and unexpected weight change.   HENT: Negative for congestion, ear discharge, ear pain, facial swelling, hearing loss, nosebleeds, postnasal drip, rhinorrhea, sinus pressure, sneezing, sore throat, tinnitus, trouble swallowing and voice change.    Eyes: Negative for discharge, redness and itching.   Respiratory: Negative for cough, choking, shortness of breath and wheezing.    Cardiovascular: Negative for chest pain and palpitations.   Gastrointestinal: Negative for abdominal pain.        No reflux.   Musculoskeletal: Negative for neck pain.   Neurological: Negative for dizziness, facial asymmetry, light-headedness and headaches.   Hematological: Negative for adenopathy. Does not bruise/bleed easily.   Psychiatric/Behavioral: Negative for agitation, behavioral problems, confusion and decreased concentration.       Objective:      Physical Exam   Constitutional: He is oriented to person, place, and time. Vital signs are normal. He appears well-developed and well-nourished. No distress.   HENT:   Head: Normocephalic and atraumatic.   Right Ear: Hearing, tympanic membrane, external ear and ear canal normal.   Left Ear: Hearing, tympanic membrane, external ear and ear canal normal.   Nose: Nose normal. No mucosal edema, rhinorrhea, nasal deformity  or septal deviation.   Mouth/Throat: Uvula is midline, oropharynx is clear and moist and mucous membranes are normal. No trismus in the jaw. Normal dentition. No uvula swelling. No oropharyngeal exudate or posterior oropharyngeal edema.   1.5 cm soft cystic lesion to the left of midline over the superior thyroid cartilage, still in the area of hair bearing skin, mobile, no overlying erythema   Eyes: Conjunctivae and EOM are normal. Pupils are equal, round, and reactive to light. Right eye exhibits no chemosis. Left eye exhibits no chemosis. Right conjunctiva is not injected. Left conjunctiva is not injected. No scleral icterus.   Neck: Trachea normal and phonation normal. No tracheal tenderness present. No tracheal deviation present. No thyroid mass and no thyromegaly present.   Cardiovascular: Intact distal pulses.   Pulmonary/Chest: Effort normal. No accessory muscle usage or stridor. No respiratory distress.   Lymphadenopathy:        Head (right side): No submental, no submandibular, no preauricular and no posterior auricular adenopathy present.        Head (left side): No submental, no submandibular, no preauricular and no posterior auricular adenopathy present.     He has no cervical adenopathy.        Right cervical: No superficial cervical and no deep cervical adenopathy present.       Left cervical: No superficial cervical and no deep cervical adenopathy present.   Neurological: He is alert and oriented to person, place, and time. No cranial nerve deficit.   Skin: Skin is warm and dry. No rash noted. No erythema.   Psychiatric: He has a normal mood and affect. His behavior is normal. Thought content normal.       US NECK:    FINDINGS:  There is a fusiform shaped area measuring 1.1 x 0.4 x 1.3 cm in the superficial subcutaneous soft tissues of the anterior left neck which correlates with the area of palpable concern.  Sonographic appearance is suggestive of possible lipoma which is not appear significantly  changed from prior.      Assessment:       1. Neck mass        Plan:       1.  Neck mass:  Reviewed his recent imaging, and is most consistent with lipoma, which certainly is consistent with his clinical examination.  We discussed different management options, including further observation as well as excision of this mass.  Risks and benefits of excision were discussed, including scar as well as recurrence.  He does note that he has been having some anxiety with related to this mass, and may wish to have it removed to have formal pathologic diagnosis.  He will call if he wishes to have this scheduled prior to the end of the year.  Otherwise, will have him return to clinic in 6 months for repeat examination, sooner if he notes any change in the area.

## 2018-09-25 ENCOUNTER — TELEPHONE (OUTPATIENT)
Dept: FAMILY MEDICINE | Facility: CLINIC | Age: 43
End: 2018-09-25

## 2018-09-25 DIAGNOSIS — Z00.00 WELL ADULT HEALTH CHECK: Primary | ICD-10-CM

## 2018-09-27 ENCOUNTER — LAB VISIT (OUTPATIENT)
Dept: LAB | Facility: HOSPITAL | Age: 43
End: 2018-09-27
Attending: FAMILY MEDICINE
Payer: COMMERCIAL

## 2018-09-27 DIAGNOSIS — Z00.00 ANNUAL PHYSICAL EXAM: ICD-10-CM

## 2018-09-27 DIAGNOSIS — Z00.00 WELL ADULT HEALTH CHECK: ICD-10-CM

## 2018-09-27 LAB
ALBUMIN SERPL BCP-MCNC: 4 G/DL
ALP SERPL-CCNC: 57 U/L
ALT SERPL W/O P-5'-P-CCNC: 32 U/L
ANION GAP SERPL CALC-SCNC: 13 MMOL/L
AST SERPL-CCNC: 21 U/L
BASOPHILS # BLD AUTO: 0.05 K/UL
BASOPHILS NFR BLD: 0.6 %
BILIRUB SERPL-MCNC: 0.9 MG/DL
BUN SERPL-MCNC: 15 MG/DL
CALCIUM SERPL-MCNC: 9.6 MG/DL
CHLORIDE SERPL-SCNC: 104 MMOL/L
CO2 SERPL-SCNC: 21 MMOL/L
CREAT SERPL-MCNC: 1.2 MG/DL
DIFFERENTIAL METHOD: ABNORMAL
EOSINOPHIL # BLD AUTO: 0.1 K/UL
EOSINOPHIL NFR BLD: 0.9 %
ERYTHROCYTE [DISTWIDTH] IN BLOOD BY AUTOMATED COUNT: 11.9 %
EST. GFR  (AFRICAN AMERICAN): >60 ML/MIN/1.73 M^2
EST. GFR  (NON AFRICAN AMERICAN): >60 ML/MIN/1.73 M^2
ESTIMATED AVG GLUCOSE: 103 MG/DL
GLUCOSE SERPL-MCNC: 92 MG/DL
HBA1C MFR BLD HPLC: 5.2 %
HCT VFR BLD AUTO: 45.5 %
HGB BLD-MCNC: 15.2 G/DL
IMM GRANULOCYTES # BLD AUTO: 0.07 K/UL
IMM GRANULOCYTES NFR BLD AUTO: 0.8 %
LYMPHOCYTES # BLD AUTO: 2.6 K/UL
LYMPHOCYTES NFR BLD: 30.3 %
MCH RBC QN AUTO: 32.2 PG
MCHC RBC AUTO-ENTMCNC: 33.4 G/DL
MCV RBC AUTO: 96 FL
MONOCYTES # BLD AUTO: 0.7 K/UL
MONOCYTES NFR BLD: 7.7 %
NEUTROPHILS # BLD AUTO: 5.2 K/UL
NEUTROPHILS NFR BLD: 59.7 %
NRBC BLD-RTO: 0 /100 WBC
PLATELET # BLD AUTO: 239 K/UL
PMV BLD AUTO: 10.7 FL
POTASSIUM SERPL-SCNC: 4.2 MMOL/L
PROT SERPL-MCNC: 7.8 G/DL
RBC # BLD AUTO: 4.72 M/UL
SODIUM SERPL-SCNC: 138 MMOL/L
WBC # BLD AUTO: 8.66 K/UL

## 2018-09-27 PROCEDURE — 36415 COLL VENOUS BLD VENIPUNCTURE: CPT | Mod: PO

## 2018-09-27 PROCEDURE — 85025 COMPLETE CBC W/AUTO DIFF WBC: CPT

## 2018-09-27 PROCEDURE — 80061 LIPID PANEL: CPT

## 2018-09-27 PROCEDURE — 83036 HEMOGLOBIN GLYCOSYLATED A1C: CPT

## 2018-09-27 PROCEDURE — 80053 COMPREHEN METABOLIC PANEL: CPT

## 2018-09-28 LAB
CHOLEST SERPL-MCNC: 232 MG/DL
CHOLEST/HDLC SERPL: 8 {RATIO}
HDLC SERPL-MCNC: 29 MG/DL
HDLC SERPL: 12.5 %
LDLC SERPL CALC-MCNC: ABNORMAL MG/DL
NONHDLC SERPL-MCNC: 203 MG/DL
TRIGL SERPL-MCNC: 1011 MG/DL

## 2018-10-02 ENCOUNTER — OFFICE VISIT (OUTPATIENT)
Dept: FAMILY MEDICINE | Facility: CLINIC | Age: 43
End: 2018-10-02
Payer: COMMERCIAL

## 2018-10-02 VITALS
TEMPERATURE: 99 F | DIASTOLIC BLOOD PRESSURE: 83 MMHG | WEIGHT: 219.94 LBS | BODY MASS INDEX: 29.79 KG/M2 | OXYGEN SATURATION: 97 % | HEART RATE: 76 BPM | SYSTOLIC BLOOD PRESSURE: 121 MMHG | HEIGHT: 72 IN

## 2018-10-02 DIAGNOSIS — L98.9 SKIN LESION: ICD-10-CM

## 2018-10-02 DIAGNOSIS — E78.2 MIXED HYPERTRIGLYCERIDEMIA: ICD-10-CM

## 2018-10-02 DIAGNOSIS — Z00.00 WELL ADULT EXAM: Primary | ICD-10-CM

## 2018-10-02 PROCEDURE — 90732 PPSV23 VACC 2 YRS+ SUBQ/IM: CPT | Mod: S$GLB,,, | Performed by: FAMILY MEDICINE

## 2018-10-02 PROCEDURE — 90686 IIV4 VACC NO PRSV 0.5 ML IM: CPT | Mod: S$GLB,,, | Performed by: FAMILY MEDICINE

## 2018-10-02 PROCEDURE — 90471 IMMUNIZATION ADMIN: CPT | Mod: S$GLB,,, | Performed by: FAMILY MEDICINE

## 2018-10-02 PROCEDURE — 90472 IMMUNIZATION ADMIN EACH ADD: CPT | Mod: S$GLB,,, | Performed by: FAMILY MEDICINE

## 2018-10-02 PROCEDURE — 99396 PREV VISIT EST AGE 40-64: CPT | Mod: 25,S$GLB,, | Performed by: FAMILY MEDICINE

## 2018-10-02 PROCEDURE — 99999 PR PBB SHADOW E&M-EST. PATIENT-LVL III: CPT | Mod: PBBFAC,,, | Performed by: FAMILY MEDICINE

## 2018-10-02 NOTE — PROGRESS NOTES
Chief Complaint:    Chief Complaint   Patient presents with    Annual Exam       History of Present Illness:    Presents today doing okay here for physical.  Not been exercising eating a lot of fast food  Triglycerides 1000.    ROS:  Review of Systems   Constitutional: Negative for activity change, chills, fatigue, fever and unexpected weight change.   HENT: Negative for congestion, ear discharge, ear pain, hearing loss, postnasal drip and rhinorrhea.    Eyes: Negative for pain and visual disturbance.   Respiratory: Negative for cough, chest tightness and shortness of breath.    Cardiovascular: Negative for chest pain and palpitations.   Gastrointestinal: Negative for abdominal pain, diarrhea and vomiting.   Endocrine: Negative for heat intolerance.   Genitourinary: Negative for dysuria, flank pain, frequency and hematuria.   Musculoskeletal: Negative for back pain, gait problem and neck pain.   Skin: Negative for color change and rash.   Neurological: Negative for dizziness, tremors, seizures, numbness and headaches.   Psychiatric/Behavioral: Negative for agitation, hallucinations, self-injury, sleep disturbance and suicidal ideas. The patient is not nervous/anxious.        Past Medical History:   Diagnosis Date    Anxiety     Hyperlipidemia        Social History:  Social History     Socioeconomic History    Marital status:      Spouse name: None    Number of children: None    Years of education: None    Highest education level: None   Social Needs    Financial resource strain: None    Food insecurity - worry: None    Food insecurity - inability: None    Transportation needs - medical: None    Transportation needs - non-medical: None   Occupational History    None   Tobacco Use    Smoking status: Current Every Day Smoker     Packs/day: 1.00     Years: 17.00     Pack years: 17.00     Types: Vaping with nicotine    Smokeless tobacco: Never Used   Substance and Sexual Activity    Alcohol use:  Yes     Alcohol/week: 1.0 oz     Types: 2 Standard drinks or equivalent per week     Comment: social    Drug use: No    Sexual activity: Yes     Partners: Female     Birth control/protection: None   Other Topics Concern    None   Social History Narrative    None       Family History:   family history includes Heart disease in his father.    Health Maintenance   Topic Date Due    Pneumococcal PPSV23 (Medium Risk) (1) 07/12/1992    Influenza Vaccine  07/01/2018    Lipid Panel  09/27/2019    TETANUS VACCINE  08/25/2026       Physical Exam:    Vital Signs  Temp: 98.8 °F (37.1 °C)  Temp src: Tympanic  Pulse: 76  SpO2: 97 %  BP: 121/83  BP Location: Left arm  Patient Position: Sitting  Pain Score: 0-No pain  Height and Weight  Height: 6' (182.9 cm)  Weight: 99.7 kg (219 lb 14.5 oz)  BSA (Calculated - sq m): 2.25 sq meters  BMI (Calculated): 29.9  Weight in (lb) to have BMI = 25: 183.9]    Body mass index is 29.82 kg/m².    Physical Exam   Constitutional: He is oriented to person, place, and time. He appears well-developed.   HENT:   Right Ear: External ear normal.   Left Ear: External ear normal.   Mouth/Throat: Oropharynx is clear and moist.   Eyes: Conjunctivae are normal. Pupils are equal, round, and reactive to light.   Neck: Normal range of motion. Neck supple.   Cardiovascular: Normal rate, regular rhythm and normal heart sounds.   No murmur heard.  Pulmonary/Chest: Effort normal and breath sounds normal. No respiratory distress. He has no wheezes. He has no rales. He exhibits no tenderness.   Abdominal: Soft. He exhibits no distension and no mass. There is no tenderness. There is no guarding.   Musculoskeletal: He exhibits no edema or tenderness.   Lymphadenopathy:     He has no cervical adenopathy.   Neurological: He is alert and oriented to person, place, and time. He has normal reflexes.   Skin: Skin is warm and dry.   Psychiatric: He has a normal mood and affect. His behavior is normal. Judgment and  thought content normal.         Assessment:      ICD-10-CM ICD-9-CM   1. Well adult exam Z00.00 V70.0   2. 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 Skin lesion L98.9 709.9         Plan:    Recommend low fat low carb diet and exercise and recheck lipid panel in 3 months  Refer to Dermatology for the skin changes on the nose.      Orders Placed This Encounter   Procedures    (In Office Administered) Pneumococcal Polysaccharide Vaccine (23 Valent) (SQ/IM)    Influenza - Quadrivalent (3 years & older) (PF)    Comprehensive metabolic panel    Lipid panel    Ambulatory referral to Dermatology       No current outpatient medications on file.     No current facility-administered medications for this visit.        There are no discontinued medications.    Follow-up in about 3 months (around 1/2/2019).      Opal Lara MD

## 2018-10-09 ENCOUNTER — TELEPHONE (OUTPATIENT)
Dept: OTOLARYNGOLOGY | Facility: CLINIC | Age: 43
End: 2018-10-09

## 2018-10-09 DIAGNOSIS — R22.1 NECK MASS: Primary | ICD-10-CM

## 2018-10-09 NOTE — TELEPHONE ENCOUNTER
Excision of left anterior neck mass; OK to see me or Sophie 7-10 days postop.  Not sure about needing any clearance, will need to wait for her to get back for that.

## 2018-10-09 NOTE — TELEPHONE ENCOUNTER
Patient would like to have surgery on Nov 16th which is your surgery day.  Please advise what type of surgery you need be to book?  When and who is he to see in post op?  Does he need any preop clearance testing?        I would like to schedule the surgery to remove the node in my neck. I only have Nov. 16th available at this time. Is that date available?

## 2018-10-18 ENCOUNTER — INITIAL CONSULT (OUTPATIENT)
Dept: DERMATOLOGY | Facility: CLINIC | Age: 43
End: 2018-10-18
Payer: COMMERCIAL

## 2018-10-18 DIAGNOSIS — L82.1 SEBORRHEIC KERATOSES: Primary | ICD-10-CM

## 2018-10-18 DIAGNOSIS — L57.0 ACTINIC KERATOSIS: ICD-10-CM

## 2018-10-18 DIAGNOSIS — Z12.83 SCREENING FOR MALIGNANT NEOPLASM OF SKIN: ICD-10-CM

## 2018-10-18 PROCEDURE — 99202 OFFICE O/P NEW SF 15 MIN: CPT | Mod: 25,S$GLB,, | Performed by: STUDENT IN AN ORGANIZED HEALTH CARE EDUCATION/TRAINING PROGRAM

## 2018-10-18 PROCEDURE — 17000 DESTRUCT PREMALG LESION: CPT | Mod: S$GLB,,, | Performed by: STUDENT IN AN ORGANIZED HEALTH CARE EDUCATION/TRAINING PROGRAM

## 2018-10-18 PROCEDURE — 17003 DESTRUCT PREMALG LES 2-14: CPT | Mod: S$GLB,,, | Performed by: STUDENT IN AN ORGANIZED HEALTH CARE EDUCATION/TRAINING PROGRAM

## 2018-10-18 PROCEDURE — 99999 PR PBB SHADOW E&M-EST. PATIENT-LVL II: CPT | Mod: PBBFAC,,, | Performed by: STUDENT IN AN ORGANIZED HEALTH CARE EDUCATION/TRAINING PROGRAM

## 2018-10-18 NOTE — LETTER
October 18, 2018      Opal Lara MD  [address] [address] [address]           [facility] - Dermatology  [address] [facility]  [place] [address]  Phone: [phone]  Fax: [phone]          Patient: Manjinder Witt   MR Number: [id number]   YOB: 1975   Date of Visit: 10/18/2018       Dear Dr. Opal Lara:    Thank you for referring Manjinder Witt to me for evaluation. Attached you will find relevant portions of my assessment and plan of care.    If you have questions, please do not hesitate to call me. I look forward to following Manjinder Witt along with you.    Sincerely,    Thomas Cabezas MD    Enclosure  CC:  No Recipients    If you would like to receive this communication electronically, please contact [email or web address] or [phone] to request more information on [employer] access.    For providers and/or their staff who would like to refer a patient to [facility], please contact us through our one-stop-shop provider referral line, [facility], at [phone].    If you feel you have received this communication in error or would no longer like to receive these types of communications, please e-mail [email or web address]

## 2018-10-18 NOTE — PATIENT INSTRUCTIONS

## 2018-10-18 NOTE — PROGRESS NOTES
Subjective:       Patient ID:  Manjinder Witt is a 43 y.o. male who presents for   Chief Complaint   Patient presents with    Spot     c/o spots to face x 3 months      History of Present Illness: The patient presents with chief complaint of skin lesions and also here for a skin check.   Location: face  Duration: 3 months   Signs/Symptoms: nose sore at times   Prior treatments: no    Pt denies history of skin cancer         Review of Systems   Skin: Negative for daily sunscreen use, activity-related sunscreen use and recent sunburn.   Hematologic/Lymphatic: Does not bruise/bleed easily.        Objective:    Physical Exam   Constitutional: He appears well-developed and well-nourished. No distress.   Neurological: He is alert and oriented to person, place, and time. He is not disoriented.   Psychiatric: He has a normal mood and affect.   Skin:   Areas Examined (abnormalities noted in diagram):   Head / Face Inspection Performed  Neck Inspection Performed  Chest / Axilla Inspection Performed  Back Inspection Performed  RUE Inspected  LUE Inspection Performed                   Diagram Legend     Erythematous scaling macule/papule c/w actinic keratosis       Vascular papule c/w angioma      Pigmented verrucoid papule/plaque c/w seborrheic keratosis      Yellow umbilicated papule c/w sebaceous hyperplasia      Irregularly shaped tan macule c/w lentigo     1-2 mm smooth white papules consistent with Milia      Movable subcutaneous cyst with punctum c/w epidermal inclusion cyst      Subcutaneous movable cyst c/w pilar cyst      Firm pink to brown papule c/w dermatofibroma      Pedunculated fleshy papule(s) c/w skin tag(s)      Evenly pigmented macule c/w junctional nevus     Mildly variegated pigmented, slightly irregular-bordered macule c/w mildly atypical nevus      Flesh colored to evenly pigmented papule c/w intradermal nevus       Pink pearly papule/plaque c/w basal cell carcinoma      Erythematous hyperkeratotic  cursted plaque c/w SCC      Surgical scar with no sign of skin cancer recurrence      Open and closed comedones      Inflammatory papules and pustules      Verrucoid papule consistent consistent with wart     Erythematous eczematous patches and plaques     Dystrophic onycholytic nail with subungual debris c/w onychomycosis     Umbilicated papule    Erythematous-base heme-crusted tan verrucoid plaque consistent with inflamed seborrheic keratosis     Erythematous Silvery Scaling Plaque c/w Psoriasis     See annotation      Assessment / Plan:        Seborrheic keratoses  These are benign inherited growths without a malignant potential. Reassurance given to patient. No treatment is necessary.     Actinic keratosis  Cryosurgery Procedure Note    Verbal consent from the patient is obtained including, but not limited to, risk of hypopigmentation/hyperpigmentation, scar, recurrence of lesion. The patient is aware of the precancerous quality and need for treatment of these lesions. Liquid nitrogen cryosurgery is applied to the 2 actinic keratoses, as detailed in the physical exam, to produce a freeze injury. The patient is aware that blisters may form and is instructed on wound care with gentle cleansing and use of vaseline ointment to keep moist until healed. The patient is supplied a handout on cryosurgery and is instructed to call if lesions do not completely resolve.    Screening for malignant neoplasm of skin  Upper body skin examination performed today including at least 6 points as noted in physical examination. No lesions suspicious for malignancy noted.    Instructed patient to observe lesion(s) for changes and follow up in clinic if changes are noted. Discussed ABCDE's of moles and brochure provided.         Follow-up in about 1 year (around 10/18/2019).

## 2018-10-22 NOTE — TELEPHONE ENCOUNTER
I spoke with the patient and I scheduled his post op.  He will come by [facility] on next Monday, 10/29/18, to sign his consent form.

## 2018-11-06 ENCOUNTER — PATIENT MESSAGE (OUTPATIENT)
Dept: FAMILY MEDICINE | Facility: CLINIC | Age: 43
End: 2018-11-06

## 2018-11-08 ENCOUNTER — HOSPITAL ENCOUNTER (OUTPATIENT)
Dept: RADIOLOGY | Facility: HOSPITAL | Age: 43
Discharge: HOME | End: 2018-11-08
Attending: FAMILY MEDICINE
Payer: COMMERCIAL

## 2018-11-08 ENCOUNTER — OFFICE VISIT (OUTPATIENT)
Dept: FAMILY MEDICINE | Facility: CLINIC | Age: 43
End: 2018-11-08
Payer: COMMERCIAL

## 2018-11-08 VITALS
BODY MASS INDEX: 29.77 KG/M2 | WEIGHT: 219.81 LBS | HEIGHT: 72 IN | DIASTOLIC BLOOD PRESSURE: 68 MMHG | HEART RATE: 48 BPM | SYSTOLIC BLOOD PRESSURE: 120 MMHG | TEMPERATURE: 98 F | OXYGEN SATURATION: 97 %

## 2018-11-08 DIAGNOSIS — Z01.818 PREOP GENERAL PHYSICAL EXAM: Primary | ICD-10-CM

## 2018-11-08 DIAGNOSIS — Z01.818 PREOP GENERAL PHYSICAL EXAM: ICD-10-CM

## 2018-11-08 DIAGNOSIS — F41.9 ANXIETY: ICD-10-CM

## 2018-11-08 PROCEDURE — 93005 ELECTROCARDIOGRAM TRACING: CPT | Mod: S$GLB,,, | Performed by: FAMILY MEDICINE

## 2018-11-08 PROCEDURE — 99999 PR PBB SHADOW E&M-EST. PATIENT-LVL III: CPT | Mod: PBBFAC,,, | Performed by: FAMILY MEDICINE

## 2018-11-08 PROCEDURE — 71046 X-RAY EXAM CHEST 2 VIEWS: CPT | Mod: TC,FY,PO

## 2018-11-08 PROCEDURE — 99243 OFF/OP CNSLTJ NEW/EST LOW 30: CPT | Mod: S$GLB,,, | Performed by: FAMILY MEDICINE

## 2018-11-08 PROCEDURE — 71046 X-RAY EXAM CHEST 2 VIEWS: CPT | Mod: 26,,, | Performed by: RADIOLOGY

## 2018-11-08 PROCEDURE — 93010 ELECTROCARDIOGRAM REPORT: CPT | Mod: S$GLB,,, | Performed by: INTERNAL MEDICINE

## 2018-11-08 RX ORDER — CLONAZEPAM 1 MG/1
1 TABLET ORAL 2 TIMES DAILY PRN
Qty: 14 TABLET | Refills: 2 | Status: SHIPPED | OUTPATIENT
Start: 2018-11-08 | End: 2019-05-09 | Stop reason: SDUPTHER

## 2018-11-08 NOTE — PROGRESS NOTES
Chief Complaint:    Chief Complaint   Patient presents with    Pre-op Exam       History of Present Illness:    He is here for preop evaluation for ankle surgery to be performed by Dr. Jm Schuler MD.  [facility].  Fax number [phone].  Surgery to be performed under general anesthesia.  Patient denies any chest pain shortness of breath.  No previous cardiac or lung disease.  He is also requesting a refill on his Klonopin that he uses occasionally for anxiety.  Patient does vaping with some nicotine    ROS:  Review of Systems   Constitutional: Negative for activity change, chills, fatigue, fever and unexpected weight change.   HENT: Negative for congestion, ear discharge, ear pain, hearing loss, postnasal drip and rhinorrhea.    Eyes: Negative for pain and visual disturbance.   Respiratory: Negative for cough, chest tightness and shortness of breath.    Cardiovascular: Negative for chest pain and palpitations.   Gastrointestinal: Negative for abdominal pain, diarrhea and vomiting.   Endocrine: Negative for heat intolerance.   Genitourinary: Negative for dysuria, flank pain, frequency and hematuria.   Musculoskeletal: Negative for back pain, gait problem and neck pain.   Skin: Negative for color change and rash.   Neurological: Negative for dizziness, tremors, seizures, numbness and headaches.   Psychiatric/Behavioral: Negative for agitation, hallucinations, self-injury, sleep disturbance and suicidal ideas. The patient is not nervous/anxious.        Past Medical History:   Diagnosis Date    Anxiety     Hyperlipidemia        Social History:  Social History     Socioeconomic History    Marital status:      Spouse name: None    Number of children: None    Years of education: None    Highest education level: None   Social Needs    Financial resource strain: None    Food insecurity - worry: None    Food insecurity - inability: None    Transportation needs  - medical: None    Transportation needs - non-medical: None   Occupational History    None   Tobacco Use    Smoking status: Current Every Day Smoker     Packs/day: 1.00     Years: 17.00     Pack years: 17.00     Types: Vaping with nicotine    Smokeless tobacco: Never Used   Substance and Sexual Activity    Alcohol use: Yes     Alcohol/week: 1.0 oz     Types: 2 Standard drinks or equivalent per week     Comment: social    Drug use: No    Sexual activity: Yes     Partners: Female     Birth control/protection: None   Other Topics Concern    None   Social History Narrative    None       Family History:   family history includes Heart disease in his father.    Health Maintenance   Topic Date Due    Lipid Panel  09/27/2019    TETANUS VACCINE  08/25/2026    Pneumococcal PPSV23 (Medium Risk) (2) 07/12/2039    Influenza Vaccine  Completed       Physical Exam:    Vital Signs  Temp: 97.9 °F (36.6 °C)  Temp src: Tympanic  Pulse: (!) 48  SpO2: 97 %  BP: 120/68  BP Location: Left arm  Patient Position: Sitting  Pain Score: 0-No pain  Height and Weight  Height: 6' (182.9 cm)  Weight: 99.7 kg (219 lb 12.8 oz)  BSA (Calculated - sq m): 2.25 sq meters  BMI (Calculated): 29.9  Weight in (lb) to have BMI = 25: 183.9]    Body mass index is 29.81 kg/m².    Physical Exam   Constitutional: He is oriented to person, place, and time. He appears well-developed.   HENT:   Mouth/Throat: Oropharynx is clear and moist.   Eyes: Conjunctivae are normal. Pupils are equal, round, and reactive to light.   Neck: Normal range of motion. Neck supple.   Cardiovascular: Normal rate, regular rhythm and normal heart sounds.   No murmur heard.  Pulmonary/Chest: Effort normal and breath sounds normal. No respiratory distress. He has no wheezes. He has no rales. He exhibits no tenderness.   Abdominal: Soft. He exhibits no distension and no mass. There is no tenderness. There is no guarding.   Musculoskeletal: He exhibits no edema or tenderness.    Lymphadenopathy:     He has no cervical adenopathy.   Neurological: He is alert and oriented to person, place, and time. He has normal reflexes.   Skin: Skin is warm and dry.   Psychiatric: He has a normal mood and affect. His behavior is normal. Judgment and thought content normal.         Assessment:      ICD-10-CM ICD-9-CM   1. Preop general physical exam Z01.818 V72.83   2. Anxiety F41.9 300.00         Plan:    Patient's perioperative risk is low, routine perioperative care is advise labs are pending.  Recommend that he stop vaping.  Also recommend that he stop any supplements/fish oil aspirin at least 7-10 days before the surgery.  Labs normal, EKG showed bradycardia patient had full cardiac evaluation and has been cleared by the cardiologist.  Please proceed as planned routine perioperative care is advise.      Orders Placed This Encounter   Procedures    X-Ray Chest PA And Lateral    CBC auto differential    Basic metabolic panel    Protime-INR    APTT    IN OFFICE EKG 12-LEAD (to [facility])       Current Outpatient Medications   Medication Sig Dispense Refill    clonazePAM (KLONOPIN) 1 MG tablet Take 1 tablet (1 mg total) by mouth 2 (two) times daily as needed for Anxiety. 14 tablet 2     No current facility-administered medications for this visit.        There are no discontinued medications.    No Follow-up on file.      Opal Lara MD

## 2018-11-12 ENCOUNTER — CLINICAL SUPPORT (OUTPATIENT)
Dept: CARDIOLOGY | Facility: CLINIC | Age: 43
End: 2018-11-12
Attending: ORTHOPAEDIC SURGERY
Payer: COMMERCIAL

## 2018-11-12 ENCOUNTER — TELEPHONE (OUTPATIENT)
Dept: FAMILY MEDICINE | Facility: CLINIC | Age: 43
End: 2018-11-12

## 2018-11-12 DIAGNOSIS — R00.1 BRADYCARDIA: ICD-10-CM

## 2018-11-12 DIAGNOSIS — R00.1 BRADYCARDIA: Primary | ICD-10-CM

## 2018-11-12 PROCEDURE — 93000 ELECTROCARDIOGRAM COMPLETE: CPT | Mod: S$GLB,,, | Performed by: NUCLEAR MEDICINE

## 2018-11-12 PROCEDURE — 93005 ELECTROCARDIOGRAM TRACING: CPT | Mod: S$GLB,,, | Performed by: ORTHOPAEDIC SURGERY

## 2018-11-13 ENCOUNTER — TELEPHONE (OUTPATIENT)
Dept: OTOLARYNGOLOGY | Facility: CLINIC | Age: 43
End: 2018-11-13

## 2018-11-13 ENCOUNTER — OFFICE VISIT (OUTPATIENT)
Dept: CARDIOLOGY | Facility: CLINIC | Age: 43
End: 2018-11-13
Payer: COMMERCIAL

## 2018-11-13 ENCOUNTER — LAB VISIT (OUTPATIENT)
Dept: LAB | Facility: HOSPITAL | Age: 43
End: 2018-11-13
Attending: INTERNAL MEDICINE
Payer: COMMERCIAL

## 2018-11-13 VITALS
HEART RATE: 64 BPM | SYSTOLIC BLOOD PRESSURE: 120 MMHG | DIASTOLIC BLOOD PRESSURE: 76 MMHG | BODY MASS INDEX: 30.4 KG/M2 | WEIGHT: 224.44 LBS | HEIGHT: 72 IN

## 2018-11-13 DIAGNOSIS — E78.2 MIXED HYPERLIPIDEMIA: ICD-10-CM

## 2018-11-13 DIAGNOSIS — F17.200 SMOKING: ICD-10-CM

## 2018-11-13 DIAGNOSIS — Z01.810 PREOP CARDIOVASCULAR EXAM: ICD-10-CM

## 2018-11-13 DIAGNOSIS — Z82.49 FAMILY HISTORY OF HEART DISEASE IN MALE FAMILY MEMBER BEFORE AGE 55: ICD-10-CM

## 2018-11-13 DIAGNOSIS — R00.1 BRADYCARDIA: ICD-10-CM

## 2018-11-13 DIAGNOSIS — Z01.810 PREOP CARDIOVASCULAR EXAM: Primary | ICD-10-CM

## 2018-11-13 LAB
CHOLEST SERPL-MCNC: 218 MG/DL
CHOLEST/HDLC SERPL: 5.7 {RATIO}
HDLC SERPL-MCNC: 38 MG/DL
HDLC SERPL: 17.4 %
LDLC SERPL CALC-MCNC: 105.2 MG/DL
NONHDLC SERPL-MCNC: 180 MG/DL
TRIGL SERPL-MCNC: 374 MG/DL
TSH SERPL DL<=0.005 MIU/L-ACNC: 1.92 UIU/ML

## 2018-11-13 PROCEDURE — 36415 COLL VENOUS BLD VENIPUNCTURE: CPT | Mod: PO

## 2018-11-13 PROCEDURE — 84443 ASSAY THYROID STIM HORMONE: CPT

## 2018-11-13 PROCEDURE — 99244 OFF/OP CNSLTJ NEW/EST MOD 40: CPT | Mod: S$GLB,,, | Performed by: INTERNAL MEDICINE

## 2018-11-13 PROCEDURE — 80061 LIPID PANEL: CPT

## 2018-11-13 PROCEDURE — 99999 PR PBB SHADOW E&M-EST. PATIENT-LVL III: CPT | Mod: PBBFAC,,, | Performed by: INTERNAL MEDICINE

## 2018-11-13 NOTE — TELEPHONE ENCOUNTER
----- Message from Lincoln Berry sent at 11/13/2018  1:42 PM CST -----  Contact: pt   Pt would like to cancel procedure and smita for December,         ..[phone] (home) [phone] (work)

## 2018-11-13 NOTE — TELEPHONE ENCOUNTER
I spoke with the patient and was able to rescheduled his surgery for Friday, 12/07/18.  His post op was also rescheduled.  I spoke with Jackeline at [facility] and she was able to move the case for me.

## 2018-11-13 NOTE — PATIENT INSTRUCTIONS
PROLIFERATIVE DIABETIC RETINOPATHY OD: NO TREATMENT IS NEEDED TODAY. WILL CONTINUE TO FOLLOW WITH DILATED EXAMS AS SCHEDULED. pi

## 2018-11-13 NOTE — PROGRESS NOTES
Subjective:   Patient ID:  Manjinder Witt is a 43 y.o. male who presents for evaluation of Establish Care; Abnormal ECG; Chest Pain; Fatigue; and Shortness of Breath      43, yo male, preop clearance of ankle surgery to be performed by Dr. Jm Schuler MD.  [facility].  Fax number [phone].  Office work.  PMH HLD and anxiety. On Klonopin prn. Fish oil.   Gym exercise 5 days a week. Cardio, swimming and jog. No exercise since  due to foot pain.  Recent fatigue and weakness. No faint, dizziness, dyspnea, orthopnea and chest pain.  Quit smoking few months ago.  Marijuana few times a week. Social drinker.  Father  at age of 32 after cabg and congenital heart Dz.        Past Medical History:   Diagnosis Date    Anxiety     Hyperlipidemia        Past Surgical History:   Procedure Laterality Date    APPENDECTOMY N/A 2018    Procedure: APPENDECTOMY;  Surgeon: Jericho Kessler MD;  Location: [facility];  Service: General;  Laterality: N/A;    APPENDECTOMY N/A 2018    Performed by Jericho Kessler MD at [facility]    FRACTURE SURGERY      left leg pins an screws        Social History     Tobacco Use    Smoking status: Current Every Day Smoker     Packs/day: 1.00     Years: 17.00     Pack years: 17.00     Types: Vaping with nicotine    Smokeless tobacco: Never Used   Substance Use Topics    Alcohol use: Yes     Alcohol/week: 1.0 oz     Types: 2 Standard drinks or equivalent per week     Comment: social    Drug use: No       Family History   Problem Relation Age of Onset    Heart disease Father        Review of Systems   Constitution: Positive for malaise/fatigue. Negative for decreased appetite, diaphoresis, fever, weakness and night sweats.   HENT: Negative for nosebleeds.    Eyes: Negative for blurred vision and double vision.   Cardiovascular: Negative for chest pain, claudication, dyspnea on exertion, irregular heartbeat, leg swelling, near-syncope, orthopnea,  palpitations, paroxysmal nocturnal dyspnea and syncope.   Respiratory: Positive for snoring. Negative for cough, shortness of breath, sleep disturbances due to breathing, sputum production and wheezing.    Endocrine: Negative for cold intolerance and polyuria.   Hematologic/Lymphatic: Does not bruise/bleed easily.   Skin: Negative for rash.   Musculoskeletal: Negative for back pain, falls, joint pain, joint swelling and neck pain.   Gastrointestinal: Negative for abdominal pain, heartburn, nausea and vomiting.   Genitourinary: Negative for dysuria, frequency and hematuria.   Neurological: Negative for difficulty with concentration, dizziness, focal weakness, headaches, light-headedness, numbness and seizures.   Psychiatric/Behavioral: Negative for depression, memory loss and substance abuse. The patient does not have insomnia.    Allergic/Immunologic: Negative for HIV exposure and hives.       Objective:   Physical Exam   Constitutional: He is oriented to person, place, and time. He appears well-nourished.   HENT:   Head: Normocephalic.   Eyes: Pupils are equal, round, and reactive to light.   Neck: Normal carotid pulses and no JVD present. Carotid bruit is not present. No thyromegaly present.   Cardiovascular: Regular rhythm, normal heart sounds and normal pulses.  No extrasystoles are present. Bradycardia present. PMI is not displaced. Exam reveals no gallop and no S3.   No murmur heard.  Pulmonary/Chest: Breath sounds normal. No stridor. No respiratory distress.   Abdominal: Soft. Bowel sounds are normal. There is no tenderness. There is no rebound.   Musculoskeletal: Normal range of motion.   Neurological: He is alert and oriented to person, place, and time.   Skin: Skin is intact. No rash noted.   Psychiatric: His behavior is normal.       Lab Results   Component Value Date    CHOL 232 (H) 09/27/2018    CHOL 194 10/09/2017    CHOL 201 (H) 09/13/2016     Lab Results   Component Value Date    HDL 29 (L)  09/27/2018    HDL 37 (L) 10/09/2017    HDL 40 09/13/2016     Lab Results   Component Value Date    LDLCALC Invalid, Trig>400.0 09/27/2018    LDLCALC 97.8 10/09/2017    LDLCALC 126.4 09/13/2016     Lab Results   Component Value Date    TRIG 1,011 (H) 09/27/2018    TRIG 296 (H) 10/09/2017    TRIG 173 (H) 09/13/2016     Lab Results   Component Value Date    CHOLHDL 12.5 (L) 09/27/2018    CHOLHDL 19.1 (L) 10/09/2017    CHOLHDL 19.9 (L) 09/13/2016       Chemistry        Component Value Date/Time     11/08/2018 1115    K 4.9 11/08/2018 1115     11/08/2018 1115    CO2 29 11/08/2018 1115    BUN 13 11/08/2018 1115    CREATININE 1.0 11/08/2018 1115    GLU 81 11/08/2018 1115        Component Value Date/Time    CALCIUM 10.1 11/08/2018 1115    ALKPHOS 57 09/27/2018 0811    AST 21 09/27/2018 0811    ALT 32 09/27/2018 0811    BILITOT 0.9 09/27/2018 0811    ESTGFRAFRICA >60.0 11/08/2018 1115    EGFRNONAA >60.0 11/08/2018 1115          Lab Results   Component Value Date    HGBA1C 5.2 09/27/2018     No results found for: TSH  Lab Results   Component Value Date    INR 0.9 11/08/2018     Lab Results   Component Value Date    WBC 7.86 11/08/2018    HGB 15.3 11/08/2018    HCT 45.5 11/08/2018    MCV 95 11/08/2018     11/08/2018     BNP  @LABRCNTIP(BNP,BNPTRIAGEBLO)@  Estimated Creatinine Clearance: 116.4 mL/min (based on SCr of 1 mg/dL).  No results found in the last 24 hours.  No results found in the last 24 hours.  No results found in the last 24 hours.    Assessment:      1. Preop cardiovascular exam    2. Bradycardia    3. Smoking    4. Family history of heart disease in male family member before age 55      Fatigue  Marcial  TG 1100, acute change  BP wnl    Plan:   tsh and Lipid profile  holter-48  Echo  If TG still high, will start Rx.    preop clearance after the tests done    Recommend heart-healthy diet, weight control and regular exercise.  [place]. Risk modification.   F/u with pcp    I have reviewed all  pertinent labs and cardiac studies. Plans and recommendations have been formulated under my direct supervision. All questions answered and patient voiced understanding. Patient to continue current medications.     Addendum on 11/19/2018  ECHo showed normal EF. Holter showed rare PACs.  Labs reviewed.  Low periop risk of CV events for non-high risk procedure.  Good functional and exercise capacity.  No chest pain, active arrhythmia and CHF symptoms.  Ok to proceed the scheduled surgery without further cardiac study.

## 2018-11-13 NOTE — LETTER
November 13, 2018      Opal Lara MD  [address] [address] [address]           [facility] - Cardiology  [address] [facility] [address]  Phone: [phone]  Fax: [phone]          Patient: Manjinder Witt   MR Number: [id number]   YOB: 1975   Date of Visit: 11/13/2018       Dear Dr. Opal Lara:    Thank you for referring Manjinder Witt to me for evaluation. Attached you will find relevant portions of my assessment and plan of care.    If you have questions, please do not hesitate to call me. I look forward to following Manjinder Witt along with you.    Sincerely,    Roby Rojas MD    Enclosure  CC:  No Recipients    If you would like to receive this communication electronically, please contact [email or web address] or [phone] to request more information on [email or web address] Link access.    For providers and/or their staff who would like to refer a patient to [facility], please contact us through our one-stop-shop provider referral line, Maribell Guzman, at [phone].    If you feel you have received this communication in error or would no longer like to receive these types of communications, please e-mail [email or web address]

## 2018-11-13 NOTE — TELEPHONE ENCOUNTER
Regarding: RE:Reminder for Upcoming Procedure  Contact: [phone]  ----- Message from [facility], System Message sent at 11/13/2018  1:38 PM CST -----    I need to cancel this appointment and schedule it again in December. I've had some complications come up that will not allow me to make the appointment on Friday. I'll look for availability in December.   ----- Message -----  From: Alcira Dale MD  Sent: 11/9/2018  8:30 AM CST  To: Manjinder Witt  Subject: Reminder for Upcoming Procedure  [facility] [address] [place] [address]    11/09/2018      Dear your,      This is a reminder for your upcoming procedure with Alcira Dale MD on 11/16/2018. We will contact you again before the day of your procedure with your scheduled arrival time.       If you have questions or scheduling concerns, you can contact your physicians office:   Alcira Dale MD  Phone Number: [phone]      Sincerely,     [facility] [address] [place] [address]

## 2018-11-14 ENCOUNTER — CLINICAL SUPPORT (OUTPATIENT)
Dept: CARDIOLOGY | Facility: CLINIC | Age: 43
End: 2018-11-14
Attending: INTERNAL MEDICINE
Payer: COMMERCIAL

## 2018-11-14 DIAGNOSIS — Z01.810 PREOP CARDIOVASCULAR EXAM: ICD-10-CM

## 2018-11-14 LAB
DIASTOLIC DYSFUNCTION: YES
ESTIMATED PA SYSTOLIC PRESSURE: 21.16
RETIRED EF AND QEF - SEE NOTES: 60 (ref 55–65)

## 2018-11-14 PROCEDURE — 93306 TTE W/DOPPLER COMPLETE: CPT | Mod: S$GLB,,, | Performed by: INTERNAL MEDICINE

## 2018-11-19 ENCOUNTER — TELEPHONE (OUTPATIENT)
Dept: CARDIOLOGY | Facility: CLINIC | Age: 43
End: 2018-11-19

## 2018-11-19 ENCOUNTER — PATIENT MESSAGE (OUTPATIENT)
Dept: FAMILY MEDICINE | Facility: CLINIC | Age: 43
End: 2018-11-19

## 2018-11-19 NOTE — TELEPHONE ENCOUNTER
Spoke with pt with test results.  Pt verbalized understanding. Pt needs clearance for surgery sent to [facility] .  Will ask dr. Rojas to addend note.

## 2018-11-19 NOTE — TELEPHONE ENCOUNTER
Spoke with pt with test results. Pt needs surgery clearance.  Sent message to Dr. Rojas    ----- Message from Santino Kasper sent at 11/19/2018  8:48 AM CST -----  Contact: Manjinder [phone]  Pt is returning a call to Riv[place]. Please contact pt at [phone]

## 2018-12-04 RX ORDER — AMOXICILLIN 500 MG
CAPSULE ORAL DAILY
COMMUNITY
End: 2020-08-24

## 2018-12-04 NOTE — PRE ADMISSION SCREENING
Pre op instructions reviewed with patient per phone:    To confirm, Your surgeon has instructed you:  Surgery is scheduled 12/07/18 at per MD.      Please report to [facility] floor main lobby by per MD.         INSTRUCTIONS IMPORTANT!!!  ¨ Do not eat, drink, or smoke after 12 midnight-including water. OK to brush teeth, no gum, candy or mints!    ¨ Take only these medicines with a small swallow of water-morning of surgery.  N/A        ____  Do not wear makeup, including mascara.  ____  No powder, lotions or creams to surgical area.  ____  Please remove all jewelry, including piercings and leave at home.  ____  No money or valuables needed. Please leave at home.  ____  Please bring identification and insurance information to hospital.  ____  If going home the same day, arrange for a ride home. You will not be able to   drive if Anesthesia was used.  ____  Children, under 12 years old, must remain in the waiting room with an adult.  They are not allowed in patient areas.  ____  Wear loose fitting clothing. Allow for dressings, bandages.  ____  Stop Aspirin, Ibuprofen, Motrin and Aleve at least 5-7 days before surgery, unless otherwise instructed by your doctor, or the nurse.   You MAY use Tylenol/acetaminophen until day of surgery.  ____  If you take diabetic medication, do not take am of surgery unless instructed by   Doctor.  ____ Stop taking any Fish Oil supplement or any Vitamins that contain Vitamin E at least 5 days prior to surgery.          Bathing Instructions-- The night before surgery and the morning prior to coming to the hospital:   -Do not shave the surgical area.   -Shower and wash your hair and body as usual with anti-bacterial  soap and shampoo.   -Rinse your hair and body completely.   -Use one packet of hibiclens to wash the surgical site (using your hand) gently for 5 minutes.  Do not scrub you skin too hard.   -Do not use hibiclens on your head, face, or  genitals.   -Do not wash with anti-bacterial soap after you use the hibiclens.   -Rinse your body thoroughly.   -Dry with clean, soft towel.  Do not use lotion, cream, deodorant, or powders on   the surgical site.    Use antibacterial soap in place of hibiclens if your surgery is on the head, face or genitals.         Surgical Site Infection    Prevention of surgical site infections:     -Keep incisions clean and dry.   -Do not soak/submerge incisions in water until completely healed.   -Do not apply lotions, powders, creams, or deodorants to site.   -Always make sure hands are cleaned with antibacterial soap/ alcohol-based   prior to touching the surgical site.  (This includes doctors, nurses, staff, and yourself.)    Signs and symptoms:   -Redness and pain around the area where you had surgery   -Drainage of cloudy fluid from your surgical wound   -Fever over 100.4  I have read or had read and explained to me, and understand the above information.

## 2018-12-05 ENCOUNTER — TELEPHONE (OUTPATIENT)
Dept: OTOLARYNGOLOGY | Facility: CLINIC | Age: 43
End: 2018-12-05

## 2018-12-05 NOTE — TELEPHONE ENCOUNTER
----- Message from Ramon Vivas sent at 12/5/2018  9:43 AM CST -----  Contact: [phone]   Pt would like to schedule procedure appt.  Please call back at [phone].  x-

## 2018-12-05 NOTE — TELEPHONE ENCOUNTER
----- Message from Keely Jones sent at 12/5/2018 10:25 AM CST -----  Contact: Pt   States he's rtn nurses call and can be reached at [phone]//thanks/dbw

## 2018-12-05 NOTE — TELEPHONE ENCOUNTER
Attempt #1 to reach the patient    I left a message asking the patient to call us back.  We need to let him know that the arrival time for surgery on Friday, 12/7/18, is 7:00 am and the surgery should begin at 8:30 am. NPO after midnight and location also need to be discussed.

## 2018-12-06 ENCOUNTER — ANESTHESIA EVENT (OUTPATIENT)
Dept: SURGERY | Facility: HOSPITAL | Age: 43
End: 2018-12-06
Payer: COMMERCIAL

## 2018-12-07 ENCOUNTER — HOSPITAL ENCOUNTER (OUTPATIENT)
Facility: HOSPITAL | Age: 43
Discharge: HOME | End: 2018-12-07
Attending: ORTHOPAEDIC SURGERY | Admitting: ORTHOPAEDIC SURGERY
Payer: COMMERCIAL

## 2018-12-07 ENCOUNTER — ANESTHESIA (OUTPATIENT)
Dept: SURGERY | Facility: HOSPITAL | Age: 43
End: 2018-12-07
Payer: COMMERCIAL

## 2018-12-07 DIAGNOSIS — R22.1 NECK MASS: Primary | ICD-10-CM

## 2018-12-07 PROCEDURE — 25000003 PHARM REV CODE 250: Performed by: ANESTHESIOLOGY

## 2018-12-07 PROCEDURE — 36000706: Performed by: ORTHOPAEDIC SURGERY

## 2018-12-07 PROCEDURE — 37000009 HC ANESTHESIA EA ADD 15 MINS: Performed by: ORTHOPAEDIC SURGERY

## 2018-12-07 PROCEDURE — 71000015 HC POSTOP RECOV 1ST HR: Performed by: ORTHOPAEDIC SURGERY

## 2018-12-07 PROCEDURE — 37000008 HC ANESTHESIA 1ST 15 MINUTES: Performed by: ORTHOPAEDIC SURGERY

## 2018-12-07 PROCEDURE — 63600175 PHARM REV CODE 636 W HCPCS: Performed by: NURSE ANESTHETIST, CERTIFIED REGISTERED

## 2018-12-07 PROCEDURE — 36000707: Performed by: ORTHOPAEDIC SURGERY

## 2018-12-07 PROCEDURE — 21556 EXC NECK TUM DEEP < 5 CM: CPT | Mod: ,,, | Performed by: ORTHOPAEDIC SURGERY

## 2018-12-07 PROCEDURE — 71000033 HC RECOVERY, INTIAL HOUR: Performed by: ORTHOPAEDIC SURGERY

## 2018-12-07 PROCEDURE — 88307 TISSUE EXAM BY PATHOLOGIST: CPT | Mod: 26,,, | Performed by: PATHOLOGY

## 2018-12-07 PROCEDURE — 25000003 PHARM REV CODE 250: Performed by: NURSE ANESTHETIST, CERTIFIED REGISTERED

## 2018-12-07 PROCEDURE — 25000003 PHARM REV CODE 250: Performed by: ORTHOPAEDIC SURGERY

## 2018-12-07 PROCEDURE — 88307 TISSUE EXAM BY PATHOLOGIST: CPT | Performed by: PATHOLOGY

## 2018-12-07 RX ORDER — MEPERIDINE HYDROCHLORIDE 50 MG/ML
12.5 INJECTION INTRAMUSCULAR; INTRAVENOUS; SUBCUTANEOUS ONCE AS NEEDED
Status: DISCONTINUED | OUTPATIENT
Start: 2018-12-07 | End: 2018-12-07 | Stop reason: HOSPADM

## 2018-12-07 RX ORDER — HYDROCODONE BITARTRATE AND ACETAMINOPHEN 5; 325 MG/1; MG/1
1 TABLET ORAL EVERY 6 HOURS PRN
Qty: 10 TABLET | Refills: 0 | Status: SHIPPED | OUTPATIENT
Start: 2018-12-07 | End: 2020-08-24

## 2018-12-07 RX ORDER — HYDROCODONE BITARTRATE AND ACETAMINOPHEN 5; 325 MG/1; MG/1
1 TABLET ORAL EVERY 4 HOURS PRN
Status: DISCONTINUED | OUTPATIENT
Start: 2018-12-07 | End: 2018-12-07 | Stop reason: HOSPADM

## 2018-12-07 RX ORDER — SODIUM CHLORIDE 0.9 % (FLUSH) 0.9 %
3 SYRINGE (ML) INJECTION
Status: DISCONTINUED | OUTPATIENT
Start: 2018-12-07 | End: 2018-12-07 | Stop reason: HOSPADM

## 2018-12-07 RX ORDER — LIDOCAINE HYDROCHLORIDE AND EPINEPHRINE 10; 10 MG/ML; UG/ML
INJECTION, SOLUTION INFILTRATION; PERINEURAL
Status: DISCONTINUED | OUTPATIENT
Start: 2018-12-07 | End: 2018-12-07 | Stop reason: HOSPADM

## 2018-12-07 RX ORDER — PROPOFOL 10 MG/ML
VIAL (ML) INTRAVENOUS
Status: DISCONTINUED | OUTPATIENT
Start: 2018-12-07 | End: 2018-12-07

## 2018-12-07 RX ORDER — ACETAMINOPHEN 10 MG/ML
1000 INJECTION, SOLUTION INTRAVENOUS ONCE
Status: DISCONTINUED | OUTPATIENT
Start: 2018-12-07 | End: 2018-12-07 | Stop reason: HOSPADM

## 2018-12-07 RX ORDER — ONDANSETRON 2 MG/ML
INJECTION INTRAMUSCULAR; INTRAVENOUS
Status: DISCONTINUED | OUTPATIENT
Start: 2018-12-07 | End: 2018-12-07

## 2018-12-07 RX ORDER — SODIUM CHLORIDE 0.9 % (FLUSH) 0.9 %
3 SYRINGE (ML) INJECTION EVERY 8 HOURS
Status: DISCONTINUED | OUTPATIENT
Start: 2018-12-07 | End: 2018-12-07 | Stop reason: HOSPADM

## 2018-12-07 RX ORDER — LIDOCAINE HYDROCHLORIDE 10 MG/ML
INJECTION INFILTRATION; PERINEURAL
Status: DISCONTINUED | OUTPATIENT
Start: 2018-12-07 | End: 2018-12-07

## 2018-12-07 RX ORDER — SODIUM CHLORIDE, SODIUM LACTATE, POTASSIUM CHLORIDE, CALCIUM CHLORIDE 600; 310; 30; 20 MG/100ML; MG/100ML; MG/100ML; MG/100ML
INJECTION, SOLUTION INTRAVENOUS CONTINUOUS
Status: DISCONTINUED | OUTPATIENT
Start: 2018-12-07 | End: 2018-12-07 | Stop reason: HOSPADM

## 2018-12-07 RX ORDER — FENTANYL CITRATE 50 UG/ML
INJECTION, SOLUTION INTRAMUSCULAR; INTRAVENOUS
Status: DISCONTINUED | OUTPATIENT
Start: 2018-12-07 | End: 2018-12-07

## 2018-12-07 RX ORDER — MIDAZOLAM HYDROCHLORIDE 1 MG/ML
INJECTION, SOLUTION INTRAMUSCULAR; INTRAVENOUS
Status: DISCONTINUED | OUTPATIENT
Start: 2018-12-07 | End: 2018-12-07

## 2018-12-07 RX ORDER — HYDROMORPHONE HYDROCHLORIDE 2 MG/ML
0.2 INJECTION, SOLUTION INTRAMUSCULAR; INTRAVENOUS; SUBCUTANEOUS EVERY 5 MIN PRN
Status: DISCONTINUED | OUTPATIENT
Start: 2018-12-07 | End: 2018-12-07 | Stop reason: HOSPADM

## 2018-12-07 RX ORDER — DEXAMETHASONE SODIUM PHOSPHATE 4 MG/ML
INJECTION, SOLUTION INTRA-ARTICULAR; INTRALESIONAL; INTRAMUSCULAR; INTRAVENOUS; SOFT TISSUE
Status: DISCONTINUED | OUTPATIENT
Start: 2018-12-07 | End: 2018-12-07

## 2018-12-07 RX ADMIN — PROPOFOL 30 MG: 10 INJECTION, EMULSION INTRAVENOUS at 09:12

## 2018-12-07 RX ADMIN — LIDOCAINE HYDROCHLORIDE 50 MG: 10 INJECTION, SOLUTION INFILTRATION; PERINEURAL at 09:12

## 2018-12-07 RX ADMIN — SODIUM CHLORIDE, SODIUM LACTATE, POTASSIUM CHLORIDE, AND CALCIUM CHLORIDE: 600; 310; 30; 20 INJECTION, SOLUTION INTRAVENOUS at 09:12

## 2018-12-07 RX ADMIN — ONDANSETRON 4 MG: 2 INJECTION, SOLUTION INTRAMUSCULAR; INTRAVENOUS at 09:12

## 2018-12-07 RX ADMIN — PROPOFOL 50 MG: 10 INJECTION, EMULSION INTRAVENOUS at 09:12

## 2018-12-07 RX ADMIN — CEFAZOLIN 2 G: 1 INJECTION, POWDER, FOR SOLUTION INTRAMUSCULAR; INTRAVENOUS at 09:12

## 2018-12-07 RX ADMIN — FENTANYL CITRATE 50 MCG: 50 INJECTION, SOLUTION INTRAMUSCULAR; INTRAVENOUS at 09:12

## 2018-12-07 RX ADMIN — DEXAMETHASONE SODIUM PHOSPHATE 4 MG: 4 INJECTION, SOLUTION INTRA-ARTICULAR; INTRALESIONAL; INTRAMUSCULAR; INTRAVENOUS; SOFT TISSUE at 09:12

## 2018-12-07 RX ADMIN — MIDAZOLAM 2 MG: 1 INJECTION INTRAMUSCULAR; INTRAVENOUS at 09:12

## 2018-12-07 NOTE — OP NOTE
DATE OF PROCEDURE:  12/07/2018.    SURGEON:  Alcira Dale M.D.    ASSISTANT:  None.    PREOPERATIVE DIAGNOSIS:  Neck mass.    POSTOPERATIVE DIAGNOSIS:  Neck mass.    PROCEDURE:  Excision of neck mass, 1 cm, consistent with lipoma.    COMPLICATIONS:  None.    FINDINGS:  One cm neck mass to the left of midline consistent with a lipoma.    PATHOLOGY:  Specimens sent for permanent pathology.    BLOOD LOSS:  1 mL    IV ANESTHESIA:  IV sedation.    MEDICATIONS ADMINISTERED IN THE OPERATING ROOM:  Ancef 2 g IV.    INDICATION FOR PROCEDURE:  The patient is a very pleasant 43-year-old gentleman   who presented to ENT clinic with complaints of a left neck mass.  Despite   evaluation with imaging as well as biopsy, it was difficult to obtain accurate   diagnosis as to this neck mass.  Risks and benefits of the procedure were   discussed with the patient, who elected to proceed as scheduled.    PROCEDURE IN DETAIL:  After appropriate consents were obtained, the patient was   taken back to the Operating Room and placed on the operating table in a supine   position.  After Anesthesia achieved an adequate level of IV sedation,   approximately 1 mL of 1% lidocaine with epinephrine was infiltrated into the   incision site.  The incision was planned along a horizontal neck crease.  The   incision was approximately 2 cm in length.  The incision was made sharply with a   #15 blade and then dissection was carried down to the surface of the mass using   a combination of small iris scissors as well as bipolar cautery.  Once the mass   was identified and was grasped in an atraumatic fashion and removed from the   neck completely.  The lipoma was found to be deep to the superficial neck   musculature and was overlying the thyroid cartilage.  Once this mass was   completely removed, the wound was inspected and irrigated.  It was then closed   in a layered fashion using interrupted 4-0 Vicryl for the deep subcutaneous   tissue followed by a  subcuticular 5-0 PDS in a running fashion.  The wound was   then dressed with Telfa and Tegaderm.  The patient's care was then returned to   Anesthesia, at which time he was awakened without difficulty and brought to   Recovery Room in good condition.      AGR/HN  dd: 12/07/2018 10:01:40 (CST)  td: 12/07/2018 12:02:30 (CST)  Doc ID   #[id number]  Job ID #[id number]    CC:

## 2018-12-07 NOTE — DISCHARGE INSTRUCTIONS
What to expect during recovery    Pain  · You will experience some level of pain after surgery.  Pain medication should help with the pain, but may not be able to eliminate it entirely.  Pain will decrease with time, and most pain will be gone by 4 to 6 weeks after surgery.  · Ice packs may help with pain and can reduce swelling.  · Your prescription pain medication may contain acetaminophen (Tylenol).  If so, you should not take additional acetaminophen (Tylenol) at the same time as your pain medication.   · Do not drive, operate machinery or power tools, or sign legal papers for 24 hours or as long as you are on your postoperative pain medication.   · Prescription pain medication should be taken only as directed.  We are not able to replace pain medication that has been lost or stolen.    Nausea/vomiting  · You may experience nausea or vomiting as a result of anesthesia or pain medication.  · If you experience severe nausea or you are unable to keep fluids down, contact your doctor.    Bleeding  · A small amount of clear or reddish drainage from the incision is normal after surgery.  · For mild bleeding from the incision, apply pressure for five minutes.  · If bleeding is severe or does not stop with pressure, contact your doctor.    Signs of infection  · Notify your doctor if you have the following signs of infection:  · Fever over 101 degrees  · Worsening redness around incsion  · Thick drainage from incision  · Foul smell from incision  · You may experience low fever/chills, this is normal after surgery.    Other post-operative symptoms  · It is safe to take over-the-counter medications for constipation, heartburn, sleep, or itching if needed.    · You may experience light-headedness, dizziness, and sleepiness following surgery. Please do not stay alone. A responsible adult should be with you for this 24 hour period.     Activity  · Try to rest and avoid strenuous activity, but also get out of bed regularly  unless your doctor has ordered strict bedrest.  · Several times every hour while you are awake, pump and flex your feet 5-6 times and do foot circles. This will help prevent blood clots.  · Several times every hour while you are awake, take 2 to 3 deep breaths and cough. If you had stomach surgery, hold a pillow or rolled towel firmly against your stomach before you cough. This will help with any pain the cough might cause.  · Do not smoke after surgery, it decreases your ability to heal and increases the risk of infection and pneumonia.    Nozin: Nasal   · Nozin reduces nasal germs to help decrease the risk of infection after surgery.  · Continue Nozin provided at discharge twice daily for 7 days or until the incision is healed.    · Place 4 drops to cotton swab tip and swab both nostril rims 6 times in each direction.  · See pamphlet for more information.     Diet  · Drink lots of fluids after surgery, unless otherwise instructed.  · You might not have much appetite at first.  Progress slowly to a normal diet unless given other specific diet instructions by your doctor.  Begin with liquids, then soup and crackers, working up to solid foods.  · Do not drink alcoholic beverages including beer for 24 hours or as long as you are on post-operative pain medication.    Follow-up after surgery  · You can contact your doctor through the patient portal using the [facility] or at [email or web address].  · You can also contact your doctor at any time by calling [phone] for the [facility] on [facility], or [phone] for the [facility] on [facility].  · A nurse will be calling you sometime after surgery. Do not be alarmed. This is our way of finding out how you are doing.

## 2018-12-07 NOTE — TRANSFER OF CARE
Anesthesia Transfer of Care Note    Patient: Manjinder Witt    Procedure(s) Performed: Procedure(s) (LRB):  EXCISION, MASS, NECK (Left)    Patient location: PACU    Anesthesia Type: MAC    Transport from OR: Transported from OR on room air with adequate spontaneous ventilation    Post pain: adequate analgesia    Post assessment: no apparent anesthetic complications    Post vital signs: stable    Level of consciousness: responds to stimulation    Nausea/Vomiting: no nausea/vomiting    Complications: none    Transfer of care protocol was followed      Last vitals:   Visit Vitals  /73 (BP Location: Right arm)   Pulse (!) 56   Temp 36.9 °C (98.4 °F) (Temporal)   Resp 20   Ht 6' (1.829 m)   Wt 101.4 kg (223 lb 8.7 oz)   SpO2 95%   BMI 30.32 kg/m²

## 2018-12-07 NOTE — PLAN OF CARE
Updated pt and spouse on current POC and discharge instructions, no questions noted. Verbalized understanding.

## 2018-12-07 NOTE — ANESTHESIA PREPROCEDURE EVALUATION
12/07/2018  Manjinder Witt is a 43 y.o., male.    Anesthesia Evaluation    I have reviewed the Patient Summary Reports.    I have reviewed the Nursing Notes.      Review of Systems  Anesthesia Hx:  No problems with previous Anesthesia  Denies Family Hx of Anesthesia complications.   Denies Personal Hx of Anesthesia complications.   Social:  Smoker, Social Alcohol Use    Hematology/Oncology:  Hematology Normal   Oncology Normal     EENT/Dental:EENT/Dental Normal   Cardiovascular:   hyperlipidemia Hx bradycardia.    Cardiac notes:    Addendum on 11/19/2018  ECHo showed normal EF. Holter showed rare PACs.  Labs reviewed.  Low periop risk of CV events for non-high risk procedure.  Good functional and exercise capacity.  No chest pain, active arrhythmia and CHF symptoms.  Ok to proceed the scheduled surgery without further cardiac study.     Pulmonary:  Pulmonary Normal    Renal/:  Renal/ Normal     Hepatic/GI:  Hepatic/GI Normal    Musculoskeletal:  Musculoskeletal Normal    Neurological:  Neurology Normal    Endocrine:  Endocrine Normal    Dermatological:  Skin Normal    Psych:   anxiety          Physical Exam  General:  Well nourished    Airway/Jaw/Neck:  Airway Findings: Mouth Opening: Normal General Airway Assessment: Adult  Mallampati: II  Improves to I with phonation.       Chest/Lungs:  Chest/Lungs Findings: Clear to auscultation, Normal Respiratory Rate     Heart/Vascular:  Heart Findings: Rate: Normal  Rhythm: Regular Rhythm        Mental Status:  Mental Status Findings:  Cooperative, Alert and Oriented         Anesthesia Plan  Type of Anesthesia, risks & benefits discussed:  Anesthesia Type:  general  Patient's Preference:   Intra-op Monitoring Plan: standard ASA monitors  Intra-op Monitoring Plan Comments:   Post Op Pain Control Plan: IV/PO Opioids PRN  Post Op Pain Control Plan Comments:    Induction:   IV  Beta Blocker:  Patient is not currently on a Beta-Blocker (No further documentation required).       Informed Consent: Patient understands risks and agrees with Anesthesia plan.  Questions answered. Anesthesia consent signed with patient.  ASA Score: 2     Day of Surgery Review of History & Physical: I have interviewed and examined the patient. I have reviewed the patient's H&P dated:            Ready For Surgery From Anesthesia Perspective.

## 2018-12-07 NOTE — BRIEF OP NOTE
[facility]  Brief Operative Note     SUMMARY     Surgery Date: 12/7/2018     Surgeon(s) and Role:     * Alcira Dale MD - Primary    Assisting Surgeon: None    Pre-op Diagnosis:  Neck mass [R22.1]    Post-op Diagnosis:  Post-Op Diagnosis Codes:     * Neck mass [R22.1]    Procedure(s) (LRB):  EXCISION, MASS, NECK (Left)    Anesthesia: Local MAC    Findings/Key Components:  1.5 cm neck mass, consistent with small lipoma    Estimated Blood Loss: 1 mL         Specimens:   Specimen (12h ago, onward)    Start     Ordered    12/07/18 0936  Specimen to Pathology - Surgery  Once     Comments:  1.) Left neck mass (PERM).DX: Left neck mass.     Start Status   12/07/18 0936 Collected (12/07/18 0935)       12/07/18 0935          Discharge Note    SUMMARY     Admit Date: 12/7/2018    Discharge Date and Time: No discharge date for patient encounter.    Attending Physician: Alcira Dale MD     Discharge Provider: Alcira Dale    Final Diagnosis: Post-Op Diagnosis Codes:     * Neck mass [R22.1]    Disposition: Home or Self Care, discharged in good condition    Follow Up/Patient Instructions:   Follow-up Information     Alcira Dale MD In 2 weeks.    Specialty:  Otolaryngology  Contact information:  [address] [place] [address] Dr Iesha SAMANIEGO [address] [phone]                   Medications:  Reconciled Home Medications:   Current Discharge Medication List      START taking these medications    Details   HYDROcodone-acetaminophen (NORCO) 5-325 mg per tablet Take 1 tablet by mouth every 6 (six) hours as needed for Pain.  Qty: 10 tablet, Refills: 0         CONTINUE these medications which have NOT CHANGED    Details   clonazePAM (KLONOPIN) 1 MG tablet Take 1 tablet (1 mg total) by mouth 2 (two) times daily as needed for Anxiety.  Qty: 14 tablet, Refills: 2      fish oil-omega-3 fatty acids 300-1,000 mg capsule Take by mouth once daily.           Discharge Procedure Orders   Diet general   Order Comments: Soft diet  x 10 days     Call MD for:  temperature >100.4     Call MD for:  severe uncontrolled pain     Call MD for:   Order Comments: Persistent bleeding from mouth     Remove dressing in 48 hours     Shower on day dressing removed (No bath)   Order Comments: OK to remove dressing in 48 hours and shower with mild soap, pat dry

## 2018-12-07 NOTE — ANESTHESIA RELEASE NOTE
Anesthesia Release from PACU Note    Patient: Manjinder Witt    Procedure(s) Performed: Procedure(s) (LRB):  EXCISION, MASS, NECK (Left)    Anesthesia type: MAC    Post pain: Adequate analgesia    Post assessment: no apparent anesthetic complications, tolerated procedure well and no evidence of recall    Last Vitals:   Visit Vitals  BP (!) 102/57   Pulse (!) 50   Temp 36.3 °C (97.4 °F) (Temporal)   Resp 18   Ht 6' (1.829 m)   Wt 101.4 kg (223 lb 8.7 oz)   SpO2 (!) 94%   BMI 30.32 kg/m²       Post vital signs: stable    Level of consciousness: responds to stimulation    Nausea/Vomiting: no nausea/no vomiting    Complications: none    Airway Patency: patent    Respiratory: unassisted    Cardiovascular: stable and blood pressure at baseline    Hydration: euvolemic

## 2018-12-07 NOTE — H&P
"Patient ID: Manjinder Witt is a 43 y.o. male.     Chief Complaint: Follow-up (1 mth rtc, review ultrasound)     Patient is a very pleasant 43 year old gentleman here today for excision of a neck mass.  He says that it has been present for at least three months, and has not significantly grown or changed in size.  He denies any pain or drainage from the area, and it has not had any overlying erythema.  He does not smoke.  He has no difficulty swallowing, throat pain, or shortness of breath.  He denies any neck pain.  He has completed a round of antibiotics, no change in the mass.     Past Medical History:   Diagnosis Date    Anxiety     General anesthetics causing adverse effect in therapeutic use     recent sx achilles tendon, given "something to dry up my sinuses and I couldn't hardly breath"    Hyperlipidemia      Past Surgical History:   Procedure Laterality Date    ACHILLES TENDON SURGERY Right     APPENDECTOMY N/A 5/24/2018    Procedure: APPENDECTOMY;  Surgeon: Jericho Kessler MD;  Location: [facility] OR;  Service: General;  Laterality: N/A;    APPENDECTOMY N/A 5/24/2018    Performed by Jericho Kessler MD at [facility] OR    FRACTURE SURGERY      left leg pins an screws      Family History   Problem Relation Age of Onset    Heart disease Father        Review of patient's allergies indicates:  No Known Allergies       Review of Systems   Constitutional: Negative for fatigue, fever and unexpected weight change.   HENT: Negative for congestion, ear discharge, ear pain, facial swelling, hearing loss, nosebleeds, postnasal drip, rhinorrhea, sinus pressure, sneezing, sore throat, tinnitus, trouble swallowing and voice change.    Eyes: Negative for discharge, redness and itching.   Respiratory: Negative for cough, choking, shortness of breath and wheezing.    Cardiovascular: Negative for chest pain and palpitations.   Gastrointestinal: Negative for abdominal pain.        No reflux.   Musculoskeletal: Negative for neck pain. "   Neurological: Negative for dizziness, facial asymmetry, light-headedness and headaches.   Hematological: Negative for adenopathy. Does not bruise/bleed easily.   Psychiatric/Behavioral: Negative for agitation, behavioral problems, confusion and decreased concentration.       Objective:   Physical Exam   Constitutional: He is oriented to person, place, and time. Vital signs are normal. He appears well-developed and well-nourished. No distress.   HENT:   Head: Normocephalic and atraumatic.   Right Ear: Hearing, tympanic membrane, external ear and ear canal normal.   Left Ear: Hearing, tympanic membrane, external ear and ear canal normal.   Nose: Nose normal. No mucosal edema, rhinorrhea, nasal deformity or septal deviation.   Mouth/Throat: Uvula is midline, oropharynx is clear and moist and mucous membranes are normal. No trismus in the jaw. Normal dentition. No uvula swelling. No oropharyngeal exudate or posterior oropharyngeal edema.   1.5 cm soft cystic lesion to the left of midline over the superior thyroid cartilage, still in the area of hair bearing skin, mobile, no overlying erythema   Eyes: Conjunctivae and EOM are normal. Pupils are equal, round, and reactive to light. Right eye exhibits no chemosis. Left eye exhibits no chemosis. Right conjunctiva is not injected. Left conjunctiva is not injected. No scleral icterus.   Neck: Trachea normal and phonation normal. No tracheal tenderness present. No tracheal deviation present. No thyroid mass and no thyromegaly present.   Cardiovascular: Intact distal pulses.   Pulmonary/Chest: Effort normal. No accessory muscle usage or stridor. No respiratory distress.   Lymphadenopathy:        Head (right side): No submental, no submandibular, no preauricular and no posterior auricular adenopathy present.        Head (left side): No submental, no submandibular, no preauricular and no posterior auricular adenopathy present.     He has no cervical adenopathy.        Right  cervical: No superficial cervical and no deep cervical adenopathy present.       Left cervical: No superficial cervical and no deep cervical adenopathy present.   Neurological: He is alert and oriented to person, place, and time. No cranial nerve deficit.   Skin: Skin is warm and dry. No rash noted. No erythema.   Psychiatric: He has a normal mood and affect. His behavior is normal. Thought content normal.       US NECK:     FINDINGS:  There is a fusiform shaped area measuring 1.1 x 0.4 x 1.3 cm in the superficial subcutaneous soft tissues of the anterior left neck which correlates with the area of palpable concern.  Sonographic appearance is suggestive of possible lipoma which is not appear significantly changed from prior.        Assessment:       1. Neck mass        Plan:       1.  Neck mass:  Reviewed his recent imaging, and is most consistent with lipoma, which certainly is consistent with his clinical examination.  We discussed different management options, including further observation as well as excision of this mass.  Risks and benefits of excision were discussed, including scar as well as recurrence.  He does note that he has been having some anxiety with related to this mass, and may wish to have it removed to have formal pathologic diagnosis. Will proceed with excision under sedation.

## 2018-12-08 NOTE — ANESTHESIA POSTPROCEDURE EVALUATION
Anesthesia Post Evaluation    Patient: Manjinder Witt    Procedure(s) Performed: Procedure(s) (LRB):  EXCISION, MASS, NECK (Left)    Final Anesthesia Type: general  Patient location during evaluation: PACU  Patient participation: Yes- Able to Participate  Level of consciousness: awake and alert and oriented  Post-procedure vital signs: reviewed and stable  Pain management: adequate  Airway patency: patent  PONV status at discharge: No PONV  Anesthetic complications: no      Cardiovascular status: hemodynamically stable  Respiratory status: unassisted, room air and spontaneous ventilation  Hydration status: euvolemic  Follow-up not needed.        Visit Vitals  /63   Pulse (!) 52   Temp 36.5 °C (97.7 °F) (Temporal)   Resp 13   Ht 6' (1.829 m)   Wt 101.4 kg (223 lb 8.7 oz)   SpO2 98%   BMI 30.32 kg/m²       Pain/Gio Score: Pain Assessment Performed: Yes (12/7/2018 10:30 AM)  Presence of Pain: denies (12/7/2018 11:00 AM)  Gio Score: 10 (12/7/2018 11:00 AM)

## 2018-12-11 VITALS
WEIGHT: 223.56 LBS | HEIGHT: 72 IN | SYSTOLIC BLOOD PRESSURE: 107 MMHG | OXYGEN SATURATION: 98 % | TEMPERATURE: 98 F | BODY MASS INDEX: 30.28 KG/M2 | HEART RATE: 52 BPM | DIASTOLIC BLOOD PRESSURE: 63 MMHG | RESPIRATION RATE: 13 BRPM

## 2018-12-17 ENCOUNTER — OFFICE VISIT (OUTPATIENT)
Dept: OTOLARYNGOLOGY | Facility: CLINIC | Age: 43
End: 2018-12-17
Payer: COMMERCIAL

## 2018-12-17 VITALS
WEIGHT: 223.31 LBS | DIASTOLIC BLOOD PRESSURE: 77 MMHG | HEIGHT: 72 IN | TEMPERATURE: 99 F | BODY MASS INDEX: 30.25 KG/M2 | HEART RATE: 75 BPM | SYSTOLIC BLOOD PRESSURE: 126 MMHG

## 2018-12-17 DIAGNOSIS — R22.1 NECK MASS: Primary | ICD-10-CM

## 2018-12-17 PROCEDURE — 99999 PR PBB SHADOW E&M-EST. PATIENT-LVL III: CPT | Mod: PBBFAC,,, | Performed by: ORTHOPAEDIC SURGERY

## 2018-12-17 PROCEDURE — 99024 POSTOP FOLLOW-UP VISIT: CPT | Mod: S$GLB,,, | Performed by: ORTHOPAEDIC SURGERY

## 2018-12-17 NOTE — PROGRESS NOTES
Subjective:       Patient ID: Manjinder Witt is a 43 y.o. male.    Chief Complaint: Other (post op, healing well)    Patient is a very pleasant 43 year old gentleman here to see me today in followup after recent excision of a neck mass.  He denies any significant pain following his surgery, and has not had any redness or swelling at the surgical site. Overall, he is very pleased with the cosmetic result of his incision.      Review of Systems   Respiratory: Negative for cough.    Cardiovascular: Negative for chest pain.   Musculoskeletal: Negative for neck pain and neck stiffness.       Objective:      Physical Exam   Neck:   Incision healing well, no erythema or induration, appropriate cosmetic result           PATHOLOGY:  FINAL PATHOLOGIC DIAGNOSIS  Left neck mass:  Lipoma.    Assessment:       1. Neck mass        Plan:       1.  Neck mass:  Now status post excision and doing well.  Discussed with patient benign diagnosis.  Return to clinic as needed.

## 2019-05-09 RX ORDER — CLONAZEPAM 1 MG/1
TABLET ORAL
Qty: 14 TABLET | Refills: 0 | Status: SHIPPED | OUTPATIENT
Start: 2019-05-09 | End: 2020-08-24

## 2019-10-07 ENCOUNTER — TELEPHONE (OUTPATIENT)
Dept: FAMILY MEDICINE | Facility: CLINIC | Age: 44
End: 2019-10-07

## 2019-10-07 NOTE — TELEPHONE ENCOUNTER
Patient is schedule to come in on 10/16 and is requesting labs prior. Orders are in for Lipid and CMP. Is there anything else he needs?

## 2019-10-14 ENCOUNTER — LAB VISIT (OUTPATIENT)
Dept: LAB | Facility: HOSPITAL | Age: 44
End: 2019-10-14
Attending: FAMILY MEDICINE
Payer: COMMERCIAL

## 2019-10-14 DIAGNOSIS — E78.2 MIXED HYPERTRIGLYCERIDEMIA: ICD-10-CM

## 2019-10-14 LAB
ALBUMIN SERPL BCP-MCNC: 4 G/DL (ref 3.5–5.2)
ALP SERPL-CCNC: 62 U/L (ref 55–135)
ALT SERPL W/O P-5'-P-CCNC: 28 U/L (ref 10–44)
ANION GAP SERPL CALC-SCNC: 11 MMOL/L (ref 8–16)
AST SERPL-CCNC: 17 U/L (ref 10–40)
BILIRUB SERPL-MCNC: 0.8 MG/DL (ref 0.1–1)
BUN SERPL-MCNC: 14 MG/DL (ref 6–20)
CALCIUM SERPL-MCNC: 9.4 MG/DL (ref 8.7–10.5)
CHLORIDE SERPL-SCNC: 105 MMOL/L (ref 95–110)
CHOLEST SERPL-MCNC: 203 MG/DL (ref 120–199)
CHOLEST/HDLC SERPL: 6.5 {RATIO} (ref 2–5)
CO2 SERPL-SCNC: 25 MMOL/L (ref 23–29)
CREAT SERPL-MCNC: 1.1 MG/DL (ref 0.5–1.4)
EST. GFR  (AFRICAN AMERICAN): >60 ML/MIN/1.73 M^2
EST. GFR  (NON AFRICAN AMERICAN): >60 ML/MIN/1.73 M^2
GLUCOSE SERPL-MCNC: 82 MG/DL (ref 70–110)
HDLC SERPL-MCNC: 31 MG/DL (ref 40–75)
HDLC SERPL: 15.3 % (ref 20–50)
LDLC SERPL CALC-MCNC: ABNORMAL MG/DL (ref 63–159)
NONHDLC SERPL-MCNC: 172 MG/DL
POTASSIUM SERPL-SCNC: 4 MMOL/L (ref 3.5–5.1)
PROT SERPL-MCNC: 7.3 G/DL (ref 6–8.4)
SODIUM SERPL-SCNC: 141 MMOL/L (ref 136–145)
TRIGL SERPL-MCNC: 468 MG/DL (ref 30–150)

## 2019-10-14 PROCEDURE — 80053 COMPREHEN METABOLIC PANEL: CPT

## 2019-10-14 PROCEDURE — 36415 COLL VENOUS BLD VENIPUNCTURE: CPT | Mod: PO

## 2019-10-14 PROCEDURE — 80061 LIPID PANEL: CPT

## 2019-10-16 ENCOUNTER — OFFICE VISIT (OUTPATIENT)
Dept: FAMILY MEDICINE | Facility: CLINIC | Age: 44
End: 2019-10-16
Payer: COMMERCIAL

## 2019-10-16 VITALS
WEIGHT: 217.69 LBS | OXYGEN SATURATION: 96 % | HEIGHT: 72 IN | SYSTOLIC BLOOD PRESSURE: 136 MMHG | HEART RATE: 87 BPM | BODY MASS INDEX: 29.48 KG/M2 | RESPIRATION RATE: 18 BRPM | TEMPERATURE: 97 F | DIASTOLIC BLOOD PRESSURE: 70 MMHG

## 2019-10-16 DIAGNOSIS — Z82.49 FAMILY HISTORY OF HEART DISEASE IN MALE FAMILY MEMBER BEFORE AGE 55: ICD-10-CM

## 2019-10-16 DIAGNOSIS — E78.2 MIXED HYPERLIPIDEMIA: ICD-10-CM

## 2019-10-16 DIAGNOSIS — Z00.00 WELL ADULT EXAM: Primary | ICD-10-CM

## 2019-10-16 PROCEDURE — 99396 PR PREVENTIVE VISIT,EST,40-64: ICD-10-PCS | Mod: S$GLB,,, | Performed by: FAMILY MEDICINE

## 2019-10-16 PROCEDURE — 99396 PREV VISIT EST AGE 40-64: CPT | Mod: S$GLB,,, | Performed by: FAMILY MEDICINE

## 2019-10-16 PROCEDURE — 99999 PR PBB SHADOW E&M-EST. PATIENT-LVL III: CPT | Mod: PBBFAC,,, | Performed by: FAMILY MEDICINE

## 2019-10-16 PROCEDURE — 99999 PR PBB SHADOW E&M-EST. PATIENT-LVL III: ICD-10-PCS | Mod: PBBFAC,,, | Performed by: FAMILY MEDICINE

## 2019-10-16 RX ORDER — ROSUVASTATIN CALCIUM 20 MG/1
20 TABLET, COATED ORAL DAILY
Qty: 90 TABLET | Refills: 3 | Status: SHIPPED | OUTPATIENT
Start: 2019-10-16 | End: 2020-08-24

## 2019-10-16 NOTE — PROGRESS NOTES
"  Chief Complaint:    Chief Complaint   Patient presents with    Annual Exam       History of Present Illness:    Presents today doing okay here for physical.  Not been exercising eating a lot of fast food  Triglycerides high again  ROS:  Review of Systems   Constitutional: Negative for activity change, chills, fatigue, fever and unexpected weight change.   HENT: Negative for congestion, ear discharge, ear pain, hearing loss, postnasal drip and rhinorrhea.    Eyes: Negative for pain and visual disturbance.   Respiratory: Negative for cough, chest tightness and shortness of breath.    Cardiovascular: Negative for chest pain and palpitations.   Gastrointestinal: Negative for abdominal pain, diarrhea and vomiting.   Endocrine: Negative for heat intolerance.   Genitourinary: Negative for dysuria, flank pain, frequency and hematuria.   Musculoskeletal: Negative for back pain, gait problem and neck pain.   Skin: Negative for color change and rash.   Neurological: Negative for dizziness, tremors, seizures, numbness and headaches.   Psychiatric/Behavioral: Negative for agitation, hallucinations, self-injury, sleep disturbance and suicidal ideas. The patient is not nervous/anxious.        Past Medical History:   Diagnosis Date    Anxiety     General anesthetics causing adverse effect in therapeutic use     recent sx achilles tendon, given "something to dry up my sinuses and I couldn't hardly breath"    Hyperlipidemia        Social History:  Social History     Socioeconomic History    Marital status:      Spouse name: Not on file    Number of children: Not on file    Years of education: Not on file    Highest education level: Not on file   Occupational History    Not on file   Social Needs    Financial resource strain: Not hard at all    Food insecurity:     Worry: Never true     Inability: Never true    Transportation needs:     Medical: No     Non-medical: No   Tobacco Use    Smoking status: Current " Every Day Smoker     Packs/day: 1.00     Years: 17.00     Pack years: 17.00     Types: Vaping with nicotine    Smokeless tobacco: Never Used    Tobacco comment: no smoking after m.n prior to surgery   Substance and Sexual Activity    Alcohol use: Yes     Alcohol/week: 1.7 standard drinks     Types: 2 Standard drinks or equivalent per week     Frequency: Monthly or less     Drinks per session: 3 or 4     Binge frequency: Never     Comment: social  No alcohol 72h prior to sx    Drug use: No    Sexual activity: Yes     Partners: Female     Birth control/protection: None   Lifestyle    Physical activity:     Days per week: 0 days     Minutes per session: 0 min    Stress: To some extent   Relationships    Social connections:     Talks on phone: More than three times a week     Gets together: Three times a week     Attends [employer] service: Not on file     Active member of club or organization: No     Attends meetings of clubs or organizations: Never     Relationship status:    Other Topics Concern    Not on file   Social History Narrative    Not on file       Family History:   family history includes Heart disease in his father.    Health Maintenance   Topic Date Due    Lipid Panel  10/14/2020    TETANUS VACCINE  08/25/2026    Pneumococcal Vaccine (Medium Risk)  Completed       Physical Exam:    Vital Signs  Temp: 97 °F (36.1 °C)  Temp src: Temporal  Pulse: 87  Resp: 18  SpO2: 96 %  BP: 136/70  BP Location: Left arm  Patient Position: Sitting  Pain Score: 0-No pain  Height and Weight  Height: 6' (182.9 cm)  Weight: 98.8 kg (217 lb 11.3 oz)  BSA (Calculated - sq m): 2.24 sq meters  BMI (Calculated): 29.6  Weight in (lb) to have BMI = 25: 183.9]    Body mass index is 29.53 kg/m².    Physical Exam   Constitutional: He is oriented to person, place, and time. He appears well-developed.   HENT:   Right Ear: External ear normal.   Left Ear: External ear normal.   Mouth/Throat: Oropharynx is clear and  moist.   Eyes: Pupils are equal, round, and reactive to light. Conjunctivae are normal.   Neck: Normal range of motion. Neck supple.   Cardiovascular: Normal rate, regular rhythm and normal heart sounds.   No murmur heard.  Pulmonary/Chest: Effort normal and breath sounds normal. No respiratory distress. He has no wheezes. He has no rales. He exhibits no tenderness.   Abdominal: Soft. He exhibits no distension and no mass. There is no tenderness. There is no guarding.   Musculoskeletal: He exhibits no edema or tenderness.   Lymphadenopathy:     He has no cervical adenopathy.   Neurological: He is alert and oriented to person, place, and time. He has normal reflexes.   Skin: Skin is warm and dry.   Psychiatric: He has a normal mood and affect. His behavior is normal. Judgment and thought content normal.         Assessment:      ICD-10-CM ICD-9-CM   1. Well adult exam Z00.00 V70.0   2. Mixed hyperlipidemia E78.2 272.2   3. Family history of heart disease in male family member before age 55 Z82.49 V17.49         Plan:    Patient's 10 year risk for heart disease is over 11 person especially with family history of heart disease will start him on Crestor 20 mg daily watch for muscle pain fatigue check a lipid panel CMP and CPK in 6 weeks  Other medical problems stable  Please take either fish oil or flex all to help lower the triglycerides and eat a low fat low carb diet and start exercising.    Orders Placed This Encounter   Procedures    Lipid panel    Comprehensive metabolic panel    CK       Current Outpatient Medications   Medication Sig Dispense Refill    clonazePAM (KLONOPIN) 1 MG tablet TAKE 1 TABLET(1 MG) BY MOUTH TWICE DAILY AS NEEDED FOR ANXIETY 14 tablet 0    fish oil-omega-3 fatty acids 300-1,000 mg capsule Take by mouth once daily.      HYDROcodone-acetaminophen (NORCO) 5-325 mg per tablet Take 1 tablet by mouth every 6 (six) hours as needed for Pain. 10 tablet 0    rosuvastatin (CRESTOR) 20 MG tablet  Take 1 tablet (20 mg total) by mouth once daily. 90 tablet 3     No current facility-administered medications for this visit.        There are no discontinued medications.    No follow-ups on file.      Opal Lara MD

## 2020-03-16 ENCOUNTER — PATIENT MESSAGE (OUTPATIENT)
Dept: FAMILY MEDICINE | Facility: CLINIC | Age: 45
End: 2020-03-16

## 2020-03-16 RX ORDER — VARENICLINE TARTRATE 0.5 (11)-1
KIT ORAL
Qty: 1 PACKAGE | Refills: 0 | Status: SHIPPED | OUTPATIENT
Start: 2020-03-16 | End: 2020-04-22

## 2020-03-25 ENCOUNTER — DOCUMENTATION ONLY (OUTPATIENT)
Dept: FAMILY MEDICINE | Facility: CLINIC | Age: 45
End: 2020-03-25

## 2020-03-25 ENCOUNTER — PATIENT MESSAGE (OUTPATIENT)
Dept: FAMILY MEDICINE | Facility: CLINIC | Age: 45
End: 2020-03-25

## 2020-03-25 NOTE — PROGRESS NOTES
Chantix Starting Danish 0.5 and 1 mg approved for a copay exception for a 180 day supply in 365 days through 03/24/2021. Message sent to patient.

## 2020-04-22 RX ORDER — VARENICLINE TARTRATE 0.5 (11)-1
KIT ORAL
Qty: 53 TABLET | Refills: 1 | Status: SHIPPED | OUTPATIENT
Start: 2020-04-22 | End: 2020-08-24

## 2020-04-23 ENCOUNTER — PATIENT MESSAGE (OUTPATIENT)
Dept: FAMILY MEDICINE | Facility: CLINIC | Age: 45
End: 2020-04-23

## 2020-08-24 ENCOUNTER — OFFICE VISIT (OUTPATIENT)
Dept: FAMILY MEDICINE | Facility: CLINIC | Age: 45
End: 2020-08-24
Payer: COMMERCIAL

## 2020-08-24 VITALS
DIASTOLIC BLOOD PRESSURE: 68 MMHG | HEIGHT: 72 IN | OXYGEN SATURATION: 95 % | SYSTOLIC BLOOD PRESSURE: 120 MMHG | BODY MASS INDEX: 31.32 KG/M2 | HEART RATE: 50 BPM | WEIGHT: 231.25 LBS | TEMPERATURE: 99 F

## 2020-08-24 DIAGNOSIS — M54.2 ANTERIOR NECK PAIN: Primary | ICD-10-CM

## 2020-08-24 PROCEDURE — 99213 OFFICE O/P EST LOW 20 MIN: CPT | Mod: S$GLB,,, | Performed by: FAMILY MEDICINE

## 2020-08-24 PROCEDURE — 99999 PR PBB SHADOW E&M-EST. PATIENT-LVL III: CPT | Mod: PBBFAC,,, | Performed by: FAMILY MEDICINE

## 2020-08-24 PROCEDURE — 3008F BODY MASS INDEX DOCD: CPT | Mod: CPTII,S$GLB,, | Performed by: FAMILY MEDICINE

## 2020-08-24 PROCEDURE — 99999 PR PBB SHADOW E&M-EST. PATIENT-LVL III: ICD-10-PCS | Mod: PBBFAC,,, | Performed by: FAMILY MEDICINE

## 2020-08-24 PROCEDURE — 99213 PR OFFICE/OUTPT VISIT, EST, LEVL III, 20-29 MIN: ICD-10-PCS | Mod: S$GLB,,, | Performed by: FAMILY MEDICINE

## 2020-08-24 PROCEDURE — 3008F PR BODY MASS INDEX (BMI) DOCUMENTED: ICD-10-PCS | Mod: CPTII,S$GLB,, | Performed by: FAMILY MEDICINE

## 2020-08-24 NOTE — PROGRESS NOTES
"  Chief Complaint:    Chief Complaint   Patient presents with    Sore Throat       History of Present Illness:    Presents today complaining of pain in the neck region for a day or so mostly with movement of the neck.  No trouble swallowing no fever cough congestion or any other symptoms.  He says actually improving somewhat.    ROS:  Review of Systems   Constitutional: Negative for activity change, chills, fatigue, fever and unexpected weight change.   HENT: Negative for congestion, ear discharge, ear pain, hearing loss, postnasal drip and rhinorrhea.    Eyes: Negative for pain and visual disturbance.   Respiratory: Negative for cough, chest tightness and shortness of breath.    Cardiovascular: Negative for chest pain and palpitations.   Gastrointestinal: Negative for abdominal pain, diarrhea and vomiting.   Endocrine: Negative for heat intolerance.   Genitourinary: Negative for dysuria, flank pain, frequency and hematuria.   Musculoskeletal: Negative for back pain, gait problem and neck pain.   Skin: Negative for color change and rash.   Neurological: Negative for dizziness, tremors, seizures, numbness and headaches.   Psychiatric/Behavioral: Negative for agitation, hallucinations, self-injury, sleep disturbance and suicidal ideas. The patient is not nervous/anxious.        Past Medical History:   Diagnosis Date    Anxiety     General anesthetics causing adverse effect in therapeutic use     recent sx achilles tendon, given "something to dry up my sinuses and I couldn't hardly breath"    Hyperlipidemia        Social History:  Social History     Socioeconomic History    Marital status:      Spouse name: Not on file    Number of children: Not on file    Years of education: Not on file    Highest education level: Not on file   Occupational History    Not on file   Social Needs    Financial resource strain: Not hard at all    Food insecurity     Worry: Never true     Inability: Never true    " Transportation needs     Medical: No     Non-medical: No   Tobacco Use    Smoking status: Current Every Day Smoker     Packs/day: 1.00     Years: 17.00     Pack years: 17.00     Types: Vaping with nicotine    Smokeless tobacco: Never Used    Tobacco comment: no smoking after m.n prior to surgery   Substance and Sexual Activity    Alcohol use: Yes     Alcohol/week: 1.7 standard drinks     Types: 2 Standard drinks or equivalent per week     Frequency: Monthly or less     Drinks per session: 3 or 4     Binge frequency: Never     Comment: social  No alcohol 72h prior to sx    Drug use: No    Sexual activity: Yes     Partners: Female     Birth control/protection: None   Lifestyle    Physical activity     Days per week: 0 days     Minutes per session: 0 min    Stress: To some extent   Relationships    Social connections     Talks on phone: More than three times a week     Gets together: Three times a week     Attends Anabaptism service: Not on file     Active member of club or organization: No     Attends meetings of clubs or organizations: Never     Relationship status:    Other Topics Concern    Not on file   Social History Narrative    Not on file       Family History:   family history includes Heart disease in his father.    Health Maintenance   Topic Date Due    Hepatitis C Screening  1975    Lipid Panel  10/14/2020    TETANUS VACCINE  08/25/2026    Pneumococcal Vaccine (Medium Risk)  Completed       Physical Exam:    Vital Signs  Temp: 98.7 °F (37.1 °C)  Temp src: Temporal  Pulse: (!) 50  SpO2: 95 %  BP: 120/68  BP Location: Left arm  Patient Position: Sitting  Pain Score:   3  Pain Loc: Neck  Height and Weight  Height: 6' (182.9 cm)  Weight: 104.9 kg (231 lb 4.2 oz)  BSA (Calculated - sq m): 2.31 sq meters  BMI (Calculated): 31.4  Weight in (lb) to have BMI = 25: 183.9]    Body mass index is 31.36 kg/m².    Physical Exam  Constitutional:       Appearance: He is well-developed.   HENT:       Head: Normocephalic and atraumatic.      Right Ear: Tympanic membrane is not bulging.      Left Ear: Tympanic membrane is not bulging.      Nose: No rhinorrhea.      Right Sinus: No maxillary sinus tenderness or frontal sinus tenderness.      Left Sinus: No maxillary sinus tenderness or frontal sinus tenderness.      Mouth/Throat:      Pharynx: No posterior oropharyngeal erythema.      Tonsils: No tonsillar abscesses.   Eyes:      Conjunctiva/sclera: Conjunctivae normal.      Pupils: Pupils are equal, round, and reactive to light.   Neck:      Musculoskeletal: Normal range of motion.     Cardiovascular:      Rate and Rhythm: Normal rate.      Heart sounds: Normal heart sounds.   Pulmonary:      Effort: Pulmonary effort is normal. No respiratory distress.      Breath sounds: Normal breath sounds. No stridor. No wheezing or rales.   Chest:      Chest wall: No tenderness.   Abdominal:      Palpations: Abdomen is soft.      Tenderness: There is no abdominal tenderness.   Lymphadenopathy:      Cervical: No cervical adenopathy.           Assessment:      ICD-10-CM ICD-9-CM   1. Anterior neck pain  M54.2 723.1         Plan:         Pain appears to be muscular in nature please take Tylenol or Aleve as tolerated  Not improved comeback     No orders of the defined types were placed in this encounter.      No current outpatient medications on file.     No current facility-administered medications for this visit.        Medications Discontinued During This Encounter   Medication Reason    clonazePAM (KLONOPIN) 1 MG tablet Patient no longer taking    fish oil-omega-3 fatty acids 300-1,000 mg capsule Patient no longer taking    HYDROcodone-acetaminophen (NORCO) 5-325 mg per tablet Patient no longer taking    rosuvastatin (CRESTOR) 20 MG tablet Patient no longer taking    varenicline (CHANTIX STARTING MONTH BOX) 0.5 mg (11)- 1 mg (42) tablet Patient no longer taking       No follow-ups on file.      Opal Lara,  MD

## 2020-10-07 ENCOUNTER — TELEPHONE (OUTPATIENT)
Dept: FAMILY MEDICINE | Facility: CLINIC | Age: 45
End: 2020-10-07

## 2020-10-07 ENCOUNTER — PATIENT MESSAGE (OUTPATIENT)
Dept: FAMILY MEDICINE | Facility: CLINIC | Age: 45
End: 2020-10-07

## 2020-10-07 DIAGNOSIS — E78.2 MIXED HYPERLIPIDEMIA: ICD-10-CM

## 2020-10-07 DIAGNOSIS — Z00.00 WELL ADULT EXAM: Primary | ICD-10-CM

## 2020-10-13 ENCOUNTER — LAB VISIT (OUTPATIENT)
Dept: LAB | Facility: HOSPITAL | Age: 45
End: 2020-10-13
Attending: FAMILY MEDICINE
Payer: COMMERCIAL

## 2020-10-13 DIAGNOSIS — E78.2 MIXED HYPERLIPIDEMIA: ICD-10-CM

## 2020-10-13 DIAGNOSIS — Z00.00 WELL ADULT EXAM: ICD-10-CM

## 2020-10-13 LAB
BASOPHILS # BLD AUTO: 0.04 K/UL (ref 0–0.2)
BASOPHILS NFR BLD: 0.6 % (ref 0–1.9)
DIFFERENTIAL METHOD: ABNORMAL
EOSINOPHIL # BLD AUTO: 0.1 K/UL (ref 0–0.5)
EOSINOPHIL NFR BLD: 1 % (ref 0–8)
ERYTHROCYTE [DISTWIDTH] IN BLOOD BY AUTOMATED COUNT: 11.9 % (ref 11.5–14.5)
HCT VFR BLD AUTO: 47.7 % (ref 40–54)
HGB BLD-MCNC: 15.3 G/DL (ref 14–18)
IMM GRANULOCYTES # BLD AUTO: 0.04 K/UL (ref 0–0.04)
IMM GRANULOCYTES NFR BLD AUTO: 0.6 % (ref 0–0.5)
LYMPHOCYTES # BLD AUTO: 2.3 K/UL (ref 1–4.8)
LYMPHOCYTES NFR BLD: 32.6 % (ref 18–48)
MCH RBC QN AUTO: 31 PG (ref 27–31)
MCHC RBC AUTO-ENTMCNC: 32.1 G/DL (ref 32–36)
MCV RBC AUTO: 97 FL (ref 82–98)
MONOCYTES # BLD AUTO: 0.6 K/UL (ref 0.3–1)
MONOCYTES NFR BLD: 7.7 % (ref 4–15)
NEUTROPHILS # BLD AUTO: 4.1 K/UL (ref 1.8–7.7)
NEUTROPHILS NFR BLD: 57.5 % (ref 38–73)
NRBC BLD-RTO: 0 /100 WBC
PLATELET # BLD AUTO: 253 K/UL (ref 150–350)
PMV BLD AUTO: 10.8 FL (ref 9.2–12.9)
RBC # BLD AUTO: 4.93 M/UL (ref 4.6–6.2)
WBC # BLD AUTO: 7.15 K/UL (ref 3.9–12.7)

## 2020-10-13 PROCEDURE — 36415 COLL VENOUS BLD VENIPUNCTURE: CPT | Mod: PO

## 2020-10-13 PROCEDURE — 80053 COMPREHEN METABOLIC PANEL: CPT

## 2020-10-13 PROCEDURE — 85025 COMPLETE CBC W/AUTO DIFF WBC: CPT

## 2020-10-13 PROCEDURE — 80061 LIPID PANEL: CPT

## 2020-10-14 LAB
ALBUMIN SERPL BCP-MCNC: 4 G/DL (ref 3.5–5.2)
ALP SERPL-CCNC: 60 U/L (ref 55–135)
ALT SERPL W/O P-5'-P-CCNC: 49 U/L (ref 10–44)
ANION GAP SERPL CALC-SCNC: 11 MMOL/L (ref 8–16)
AST SERPL-CCNC: 28 U/L (ref 10–40)
BILIRUB SERPL-MCNC: 1 MG/DL (ref 0.1–1)
BUN SERPL-MCNC: 12 MG/DL (ref 6–20)
CALCIUM SERPL-MCNC: 9.4 MG/DL (ref 8.7–10.5)
CHLORIDE SERPL-SCNC: 105 MMOL/L (ref 95–110)
CHOLEST SERPL-MCNC: 222 MG/DL (ref 120–199)
CHOLEST/HDLC SERPL: 7.7 {RATIO} (ref 2–5)
CO2 SERPL-SCNC: 22 MMOL/L (ref 23–29)
CREAT SERPL-MCNC: 1 MG/DL (ref 0.5–1.4)
EST. GFR  (AFRICAN AMERICAN): >60 ML/MIN/1.73 M^2
EST. GFR  (NON AFRICAN AMERICAN): >60 ML/MIN/1.73 M^2
GLUCOSE SERPL-MCNC: 83 MG/DL (ref 70–110)
HDLC SERPL-MCNC: 29 MG/DL (ref 40–75)
HDLC SERPL: 13.1 % (ref 20–50)
LDLC SERPL CALC-MCNC: ABNORMAL MG/DL (ref 63–159)
NONHDLC SERPL-MCNC: 193 MG/DL
POTASSIUM SERPL-SCNC: 3.8 MMOL/L (ref 3.5–5.1)
PROT SERPL-MCNC: 7.7 G/DL (ref 6–8.4)
SODIUM SERPL-SCNC: 138 MMOL/L (ref 136–145)
TRIGL SERPL-MCNC: 810 MG/DL (ref 30–150)

## 2020-10-16 ENCOUNTER — OFFICE VISIT (OUTPATIENT)
Dept: FAMILY MEDICINE | Facility: CLINIC | Age: 45
End: 2020-10-16
Payer: COMMERCIAL

## 2020-10-16 VITALS
BODY MASS INDEX: 31.08 KG/M2 | HEART RATE: 64 BPM | OXYGEN SATURATION: 97 % | WEIGHT: 229.5 LBS | DIASTOLIC BLOOD PRESSURE: 66 MMHG | HEIGHT: 72 IN | TEMPERATURE: 97 F | SYSTOLIC BLOOD PRESSURE: 110 MMHG

## 2020-10-16 DIAGNOSIS — Z87.891 QUIT SMOKING: ICD-10-CM

## 2020-10-16 DIAGNOSIS — E66.9 OBESITY (BMI 30.0-34.9): ICD-10-CM

## 2020-10-16 DIAGNOSIS — E78.2 MIXED HYPERLIPIDEMIA: ICD-10-CM

## 2020-10-16 DIAGNOSIS — Z00.00 WELL ADULT EXAM: Primary | ICD-10-CM

## 2020-10-16 DIAGNOSIS — E78.1 HYPERTRIGLYCERIDEMIA: ICD-10-CM

## 2020-10-16 PROCEDURE — 3008F BODY MASS INDEX DOCD: CPT | Mod: CPTII,S$GLB,, | Performed by: FAMILY MEDICINE

## 2020-10-16 PROCEDURE — 99999 PR PBB SHADOW E&M-EST. PATIENT-LVL III: ICD-10-PCS | Mod: PBBFAC,,, | Performed by: FAMILY MEDICINE

## 2020-10-16 PROCEDURE — 90686 IIV4 VACC NO PRSV 0.5 ML IM: CPT | Mod: S$GLB,,, | Performed by: FAMILY MEDICINE

## 2020-10-16 PROCEDURE — 90471 IMMUNIZATION ADMIN: CPT | Mod: S$GLB,,, | Performed by: FAMILY MEDICINE

## 2020-10-16 PROCEDURE — 99396 PR PREVENTIVE VISIT,EST,40-64: ICD-10-PCS | Mod: 25,S$GLB,, | Performed by: FAMILY MEDICINE

## 2020-10-16 PROCEDURE — 99999 PR PBB SHADOW E&M-EST. PATIENT-LVL III: CPT | Mod: PBBFAC,,, | Performed by: FAMILY MEDICINE

## 2020-10-16 PROCEDURE — 99396 PREV VISIT EST AGE 40-64: CPT | Mod: 25,S$GLB,, | Performed by: FAMILY MEDICINE

## 2020-10-16 PROCEDURE — 3008F PR BODY MASS INDEX (BMI) DOCUMENTED: ICD-10-PCS | Mod: CPTII,S$GLB,, | Performed by: FAMILY MEDICINE

## 2020-10-16 PROCEDURE — 90471 FLU VACCINE (QUAD) GREATER THAN OR EQUAL TO 3YO PRESERVATIVE FREE IM: ICD-10-PCS | Mod: S$GLB,,, | Performed by: FAMILY MEDICINE

## 2020-10-16 PROCEDURE — 90686 FLU VACCINE (QUAD) GREATER THAN OR EQUAL TO 3YO PRESERVATIVE FREE IM: ICD-10-PCS | Mod: S$GLB,,, | Performed by: FAMILY MEDICINE

## 2020-10-16 RX ORDER — FENOFIBRATE 160 MG/1
160 TABLET ORAL DAILY
Qty: 90 TABLET | Refills: 3 | Status: SHIPPED | OUTPATIENT
Start: 2020-10-16 | End: 2021-10-28

## 2020-10-16 NOTE — PROGRESS NOTES
"  Chief Complaint:    Chief Complaint   Patient presents with    Annual Exam       History of Present Illness:    Presents today doing okay here for physical.  Not been exercising eating a lot of fast food  Triglycerides high again is gained a lot a weight  Had side effects to statin therapy muscle pain so he stopped using  Recently quit smoking  ROS:  Review of Systems   Constitutional: Negative for activity change, chills, fatigue, fever and unexpected weight change.   HENT: Negative for congestion, ear discharge, ear pain, hearing loss, postnasal drip and rhinorrhea.    Eyes: Negative for pain and visual disturbance.   Respiratory: Negative for cough, chest tightness and shortness of breath.    Cardiovascular: Negative for chest pain and palpitations.   Gastrointestinal: Negative for abdominal pain, diarrhea and vomiting.   Endocrine: Negative for heat intolerance.   Genitourinary: Negative for dysuria, flank pain, frequency and hematuria.   Musculoskeletal: Negative for back pain, gait problem and neck pain.   Skin: Negative for color change and rash.   Neurological: Negative for dizziness, tremors, seizures, numbness and headaches.   Psychiatric/Behavioral: Negative for agitation, hallucinations, self-injury, sleep disturbance and suicidal ideas. The patient is not nervous/anxious.        Past Medical History:   Diagnosis Date    Anxiety     General anesthetics causing adverse effect in therapeutic use     recent sx achilles tendon, given "something to dry up my sinuses and I couldn't hardly breath"    Hyperlipidemia        Social History:  Social History     Socioeconomic History    Marital status:      Spouse name: Not on file    Number of children: Not on file    Years of education: Not on file    Highest education level: Not on file   Occupational History    Not on file   Social Needs    Financial resource strain: Not hard at all    Food insecurity     Worry: Never true     Inability: " Never true    Transportation needs     Medical: No     Non-medical: No   Tobacco Use    Smoking status: Current Every Day Smoker     Packs/day: 1.00     Years: 17.00     Pack years: 17.00     Types: Vaping with nicotine    Smokeless tobacco: Never Used    Tobacco comment: no smoking after m.n prior to surgery   Substance and Sexual Activity    Alcohol use: Yes     Alcohol/week: 1.7 standard drinks     Types: 2 Standard drinks or equivalent per week     Frequency: Monthly or less     Drinks per session: 3 or 4     Binge frequency: Never     Comment: social  No alcohol 72h prior to sx    Drug use: No    Sexual activity: Yes     Partners: Female     Birth control/protection: None   Lifestyle    Physical activity     Days per week: 0 days     Minutes per session: 0 min    Stress: To some extent   Relationships    Social connections     Talks on phone: More than three times a week     Gets together: Three times a week     Attends [employer] service: Not on file     Active member of club or organization: No     Attends meetings of clubs or organizations: Never     Relationship status:    Other Topics Concern    Not on file   Social History Narrative    Not on file       Family History:   family history includes Heart disease in his father.    Health Maintenance   Topic Date Due    Hepatitis C Screening  1975    Lipid Panel  10/13/2021    TETANUS VACCINE  08/25/2026    Pneumococcal Vaccine (Medium Risk)  Completed       Physical Exam:    Vital Signs  Temp: 97.3 °F (36.3 °C)  Pulse: 64  SpO2: 97 %  BP: 110/66  Pain Score: 0-No pain  Height and Weight  Height: 6' (182.9 cm)  Weight: 104.1 kg (229 lb 8 oz)  BSA (Calculated - sq m): 2.3 sq meters  BMI (Calculated): 31.1  Weight in (lb) to have BMI = 25: 183.9]    Body mass index is 31.13 kg/m².    Physical Exam  Constitutional:       Appearance: He is well-developed.   HENT:      Right Ear: External ear normal.      Left Ear: External ear normal.    Eyes:      Conjunctiva/sclera: Conjunctivae normal.      Pupils: Pupils are equal, round, and reactive to light.   Neck:      Musculoskeletal: Normal range of motion and neck supple.   Cardiovascular:      Rate and Rhythm: Normal rate and regular rhythm.      Heart sounds: Normal heart sounds. No murmur.   Pulmonary:      Effort: Pulmonary effort is normal. No respiratory distress.      Breath sounds: Normal breath sounds. No wheezing or rales.   Chest:      Chest wall: No tenderness.   Abdominal:      General: There is no distension.      Palpations: Abdomen is soft. There is no mass.      Tenderness: There is no abdominal tenderness. There is no guarding.   Musculoskeletal:         General: No tenderness.   Lymphadenopathy:      Cervical: No cervical adenopathy.   Skin:     General: Skin is warm and dry.   Neurological:      Mental Status: He is alert and oriented to person, place, and time.      Deep Tendon Reflexes: Reflexes are normal and symmetric.   Psychiatric:         Behavior: Behavior normal.         Thought Content: Thought content normal.         Judgment: Judgment normal.           Assessment:      ICD-10-CM ICD-9-CM   1. Well adult exam  Z00.00 V70.0   2. Mixed hyperlipidemia  E78.2 272.2   3. Hypertriglyceridemia  E78.1 272.1   4. Quit smoking  Z87.891 V15.82   5. Obesity (BMI 30.0-34.9)  E66.9 278.00         Plan:     an cutting out the simple carb/sugars and excessive fat son diet and start an exercise program  Will start on Tricor 160 mg daily watchful muscle pain fatigue check lipid panel CMP in 6 weeks  Maintain smoking cessation  Work on weight loss    Orders Placed This Encounter   Procedures    Lipid Panel       Current Outpatient Medications   Medication Sig Dispense Refill    fenofibrate 160 MG Tab Take 1 tablet (160 mg total) by mouth once daily. 90 tablet 3     No current facility-administered medications for this visit.        There are no discontinued medications.    No  follow-ups on file.      Opal Lara MD

## 2020-11-17 ENCOUNTER — PATIENT MESSAGE (OUTPATIENT)
Dept: FAMILY MEDICINE | Facility: CLINIC | Age: 45
End: 2020-11-17

## 2020-11-24 ENCOUNTER — LAB VISIT (OUTPATIENT)
Dept: LAB | Facility: HOSPITAL | Age: 45
End: 2020-11-24
Attending: FAMILY MEDICINE
Payer: COMMERCIAL

## 2020-11-24 DIAGNOSIS — E78.2 MIXED HYPERLIPIDEMIA: ICD-10-CM

## 2020-11-24 DIAGNOSIS — E78.1 HYPERTRIGLYCERIDEMIA: ICD-10-CM

## 2020-11-24 LAB
CHOLEST SERPL-MCNC: 226 MG/DL (ref 120–199)
CHOLEST/HDLC SERPL: 5.5 {RATIO} (ref 2–5)
HDLC SERPL-MCNC: 41 MG/DL (ref 40–75)
HDLC SERPL: 18.1 % (ref 20–50)
LDLC SERPL CALC-MCNC: 140.8 MG/DL (ref 63–159)
NONHDLC SERPL-MCNC: 185 MG/DL
TRIGL SERPL-MCNC: 221 MG/DL (ref 30–150)

## 2020-11-24 PROCEDURE — 80061 LIPID PANEL: CPT

## 2020-11-24 PROCEDURE — 36415 COLL VENOUS BLD VENIPUNCTURE: CPT | Mod: PO

## 2020-11-30 ENCOUNTER — OFFICE VISIT (OUTPATIENT)
Dept: FAMILY MEDICINE | Facility: CLINIC | Age: 45
End: 2020-11-30
Payer: COMMERCIAL

## 2020-11-30 VITALS
HEART RATE: 50 BPM | HEIGHT: 72 IN | OXYGEN SATURATION: 93 % | TEMPERATURE: 98 F | WEIGHT: 232.69 LBS | DIASTOLIC BLOOD PRESSURE: 78 MMHG | BODY MASS INDEX: 31.52 KG/M2 | SYSTOLIC BLOOD PRESSURE: 124 MMHG

## 2020-11-30 DIAGNOSIS — Z87.891 QUIT SMOKING: ICD-10-CM

## 2020-11-30 DIAGNOSIS — E78.2 MIXED HYPERLIPIDEMIA: Primary | ICD-10-CM

## 2020-11-30 PROCEDURE — 99999 PR PBB SHADOW E&M-EST. PATIENT-LVL III: CPT | Mod: PBBFAC,,, | Performed by: FAMILY MEDICINE

## 2020-11-30 PROCEDURE — 3008F PR BODY MASS INDEX (BMI) DOCUMENTED: ICD-10-PCS | Mod: CPTII,S$GLB,, | Performed by: FAMILY MEDICINE

## 2020-11-30 PROCEDURE — 99214 OFFICE O/P EST MOD 30 MIN: CPT | Mod: S$GLB,,, | Performed by: FAMILY MEDICINE

## 2020-11-30 PROCEDURE — 99999 PR PBB SHADOW E&M-EST. PATIENT-LVL III: ICD-10-PCS | Mod: PBBFAC,,, | Performed by: FAMILY MEDICINE

## 2020-11-30 PROCEDURE — 1126F PR PAIN SEVERITY QUANTIFIED, NO PAIN PRESENT: ICD-10-PCS | Mod: S$GLB,,, | Performed by: FAMILY MEDICINE

## 2020-11-30 PROCEDURE — 3008F BODY MASS INDEX DOCD: CPT | Mod: CPTII,S$GLB,, | Performed by: FAMILY MEDICINE

## 2020-11-30 PROCEDURE — 1126F AMNT PAIN NOTED NONE PRSNT: CPT | Mod: S$GLB,,, | Performed by: FAMILY MEDICINE

## 2020-11-30 PROCEDURE — 99214 PR OFFICE/OUTPT VISIT, EST, LEVL IV, 30-39 MIN: ICD-10-PCS | Mod: S$GLB,,, | Performed by: FAMILY MEDICINE

## 2020-11-30 RX ORDER — EZETIMIBE 10 MG/1
10 TABLET ORAL DAILY
Qty: 90 TABLET | Refills: 3 | Status: SHIPPED | OUTPATIENT
Start: 2020-11-30 | End: 2021-10-28

## 2020-11-30 NOTE — PROGRESS NOTES
"  Chief Complaint:    Chief Complaint   Patient presents with    Follow-up       History of Present Illness:  He is here for 6 week follow-up  He is doing okay he has made some changes in a diet and start exercising.  Triglycerides a coming down is also on Tricor.  No side effects  Is been many months since he quit smoking.    ROS:  Review of Systems   Constitutional: Negative for activity change, chills, fatigue, fever and unexpected weight change.   HENT: Negative for congestion, ear discharge, ear pain, hearing loss, postnasal drip and rhinorrhea.    Eyes: Negative for pain and visual disturbance.   Respiratory: Negative for cough, chest tightness and shortness of breath.    Cardiovascular: Negative for chest pain and palpitations.   Gastrointestinal: Negative for abdominal pain, diarrhea and vomiting.   Endocrine: Negative for heat intolerance.   Genitourinary: Negative for dysuria, flank pain, frequency and hematuria.   Musculoskeletal: Negative for back pain, gait problem and neck pain.   Skin: Negative for color change and rash.   Neurological: Negative for dizziness, tremors, seizures, numbness and headaches.   Psychiatric/Behavioral: Negative for agitation, hallucinations, self-injury, sleep disturbance and suicidal ideas. The patient is not nervous/anxious.        Past Medical History:   Diagnosis Date    Anxiety     General anesthetics causing adverse effect in therapeutic use     recent sx achilles tendon, given "something to dry up my sinuses and I couldn't hardly breath"    Hyperlipidemia        Social History:  Social History     Socioeconomic History    Marital status:      Spouse name: Not on file    Number of children: Not on file    Years of education: Not on file    Highest education level: Not on file   Occupational History    Not on file   Social Needs    Financial resource strain: Not hard at all    Food insecurity     Worry: Never true     Inability: Never true    " Transportation needs     Medical: No     Non-medical: No   Tobacco Use    Smoking status: Current Every Day Smoker     Packs/day: 1.00     Years: 17.00     Pack years: 17.00     Types: Vaping with nicotine    Smokeless tobacco: Never Used    Tobacco comment: no smoking after m.n prior to surgery   Substance and Sexual Activity    Alcohol use: Yes     Alcohol/week: 1.7 standard drinks     Types: 2 Standard drinks or equivalent per week     Frequency: Monthly or less     Drinks per session: 3 or 4     Binge frequency: Never     Comment: social  No alcohol 72h prior to sx    Drug use: No    Sexual activity: Yes     Partners: Female     Birth control/protection: None   Lifestyle    Physical activity     Days per week: 0 days     Minutes per session: 0 min    Stress: To some extent   Relationships    Social connections     Talks on phone: More than three times a week     Gets together: Three times a week     Attends Islam service: Not on file     Active member of club or organization: No     Attends meetings of clubs or organizations: Never     Relationship status:    Other Topics Concern    Not on file   Social History Narrative    Not on file       Family History:   family history includes Heart disease in his father.    Health Maintenance   Topic Date Due    Hepatitis C Screening  1975    Lipid Panel  11/24/2021    TETANUS VACCINE  08/25/2026    Pneumococcal Vaccine (Medium Risk)  Completed       Physical Exam:    Vital Signs  Temp: 97.5 °F (36.4 °C)  Temp src: Temporal  Pulse: (!) 50  SpO2: (!) 93 %  BP: 124/78  BP Location: Left arm  Patient Position: Sitting  Pain Score: 0-No pain  Height and Weight  Height: 6' (182.9 cm)  Weight: 105.6 kg (232 lb 11.1 oz)  BSA (Calculated - sq m): 2.32 sq meters  BMI (Calculated): 31.6  Weight in (lb) to have BMI = 25: 183.9]    Body mass index is 31.56 kg/m².    Physical Exam  Constitutional:       Appearance: He is well-developed.   HENT:       Right Ear: External ear normal.      Left Ear: External ear normal.   Eyes:      Conjunctiva/sclera: Conjunctivae normal.      Pupils: Pupils are equal, round, and reactive to light.   Neck:      Musculoskeletal: Normal range of motion and neck supple.   Cardiovascular:      Rate and Rhythm: Normal rate and regular rhythm.      Heart sounds: Normal heart sounds. No murmur.   Pulmonary:      Effort: Pulmonary effort is normal. No respiratory distress.      Breath sounds: Normal breath sounds. No wheezing or rales.   Chest:      Chest wall: No tenderness.   Abdominal:      General: There is no distension.      Palpations: Abdomen is soft. There is no mass.      Tenderness: There is no abdominal tenderness. There is no guarding.   Musculoskeletal:         General: No tenderness.   Lymphadenopathy:      Cervical: No cervical adenopathy.   Skin:     General: Skin is warm and dry.   Neurological:      Mental Status: He is alert and oriented to person, place, and time.      Deep Tendon Reflexes: Reflexes are normal and symmetric.   Psychiatric:         Behavior: Behavior normal.         Thought Content: Thought content normal.         Judgment: Judgment normal.           Assessment:      ICD-10-CM ICD-9-CM   1. Mixed hyperlipidemia  E78.2 272.2   2. Quit smoking  Z87.891 V15.82         Plan:    Good response to Tricor, add Zetia to lower total cholesterol and LDL cholesterol watch for muscle pain fatigue check lipid panel CMP in 6 weeks  Maintain smoking cessation  Continue x-ray  Follow-up 1 year  Orders Placed This Encounter   Procedures    Comprehensive Metabolic Panel    CK    Lipid Panel    Hepatitis C Antibody    HIV 1/2 Ag/Ab (4th Gen)       Current Outpatient Medications   Medication Sig Dispense Refill    fenofibrate 160 MG Tab Take 1 tablet (160 mg total) by mouth once daily. 90 tablet 3    ezetimibe (ZETIA) 10 mg tablet Take 1 tablet (10 mg total) by mouth once daily. 90 tablet 3     No current  facility-administered medications for this visit.        There are no discontinued medications.    No follow-ups on file.      Opal Lara MD

## 2021-05-17 ENCOUNTER — PATIENT MESSAGE (OUTPATIENT)
Dept: FAMILY MEDICINE | Facility: CLINIC | Age: 46
End: 2021-05-17

## 2021-05-19 ENCOUNTER — LAB VISIT (OUTPATIENT)
Dept: LAB | Facility: HOSPITAL | Age: 46
End: 2021-05-19
Attending: FAMILY MEDICINE
Payer: COMMERCIAL

## 2021-05-19 ENCOUNTER — PATIENT MESSAGE (OUTPATIENT)
Dept: ENDOSCOPY | Facility: HOSPITAL | Age: 46
End: 2021-05-19

## 2021-05-19 ENCOUNTER — OFFICE VISIT (OUTPATIENT)
Dept: FAMILY MEDICINE | Facility: CLINIC | Age: 46
End: 2021-05-19
Payer: COMMERCIAL

## 2021-05-19 VITALS
TEMPERATURE: 99 F | WEIGHT: 229.75 LBS | HEIGHT: 72 IN | DIASTOLIC BLOOD PRESSURE: 80 MMHG | HEART RATE: 59 BPM | OXYGEN SATURATION: 98 % | SYSTOLIC BLOOD PRESSURE: 122 MMHG | BODY MASS INDEX: 31.12 KG/M2

## 2021-05-19 DIAGNOSIS — M79.10 MUSCLE PAIN: ICD-10-CM

## 2021-05-19 DIAGNOSIS — K29.01 ACUTE GASTRITIS WITH HEMORRHAGE, UNSPECIFIED GASTRITIS TYPE: ICD-10-CM

## 2021-05-19 DIAGNOSIS — K29.01 ACUTE GASTRITIS WITH HEMORRHAGE, UNSPECIFIED GASTRITIS TYPE: Primary | ICD-10-CM

## 2021-05-19 PROCEDURE — 36415 COLL VENOUS BLD VENIPUNCTURE: CPT | Mod: PO | Performed by: FAMILY MEDICINE

## 2021-05-19 PROCEDURE — 80053 COMPREHEN METABOLIC PANEL: CPT | Performed by: FAMILY MEDICINE

## 2021-05-19 PROCEDURE — 83690 ASSAY OF LIPASE: CPT | Performed by: FAMILY MEDICINE

## 2021-05-19 PROCEDURE — 99214 PR OFFICE/OUTPT VISIT, EST, LEVL IV, 30-39 MIN: ICD-10-PCS | Mod: S$GLB,,, | Performed by: FAMILY MEDICINE

## 2021-05-19 PROCEDURE — 3008F PR BODY MASS INDEX (BMI) DOCUMENTED: ICD-10-PCS | Mod: CPTII,S$GLB,, | Performed by: FAMILY MEDICINE

## 2021-05-19 PROCEDURE — 99999 PR PBB SHADOW E&M-EST. PATIENT-LVL III: CPT | Mod: PBBFAC,,, | Performed by: FAMILY MEDICINE

## 2021-05-19 PROCEDURE — 82150 ASSAY OF AMYLASE: CPT | Performed by: FAMILY MEDICINE

## 2021-05-19 PROCEDURE — 99999 PR PBB SHADOW E&M-EST. PATIENT-LVL III: ICD-10-PCS | Mod: PBBFAC,,, | Performed by: FAMILY MEDICINE

## 2021-05-19 PROCEDURE — 1126F AMNT PAIN NOTED NONE PRSNT: CPT | Mod: S$GLB,,, | Performed by: FAMILY MEDICINE

## 2021-05-19 PROCEDURE — 1126F PR PAIN SEVERITY QUANTIFIED, NO PAIN PRESENT: ICD-10-PCS | Mod: S$GLB,,, | Performed by: FAMILY MEDICINE

## 2021-05-19 PROCEDURE — 99214 OFFICE O/P EST MOD 30 MIN: CPT | Mod: S$GLB,,, | Performed by: FAMILY MEDICINE

## 2021-05-19 PROCEDURE — 3008F BODY MASS INDEX DOCD: CPT | Mod: CPTII,S$GLB,, | Performed by: FAMILY MEDICINE

## 2021-05-20 ENCOUNTER — PATIENT MESSAGE (OUTPATIENT)
Dept: ENDOSCOPY | Facility: HOSPITAL | Age: 46
End: 2021-05-20

## 2021-05-20 DIAGNOSIS — Z01.818 PRE-OP TESTING: ICD-10-CM

## 2021-05-20 LAB
ALBUMIN SERPL BCP-MCNC: 4.5 G/DL (ref 3.5–5.2)
ALP SERPL-CCNC: 55 U/L (ref 55–135)
ALT SERPL W/O P-5'-P-CCNC: 38 U/L (ref 10–44)
AMYLASE SERPL-CCNC: 45 U/L (ref 20–110)
ANION GAP SERPL CALC-SCNC: 8 MMOL/L (ref 8–16)
AST SERPL-CCNC: 26 U/L (ref 10–40)
BILIRUB SERPL-MCNC: 0.8 MG/DL (ref 0.1–1)
BUN SERPL-MCNC: 11 MG/DL (ref 6–20)
CALCIUM SERPL-MCNC: 10.9 MG/DL (ref 8.7–10.5)
CHLORIDE SERPL-SCNC: 103 MMOL/L (ref 95–110)
CO2 SERPL-SCNC: 27 MMOL/L (ref 23–29)
CREAT SERPL-MCNC: 1.1 MG/DL (ref 0.5–1.4)
EST. GFR  (AFRICAN AMERICAN): >60 ML/MIN/1.73 M^2
EST. GFR  (NON AFRICAN AMERICAN): >60 ML/MIN/1.73 M^2
GLUCOSE SERPL-MCNC: 83 MG/DL (ref 70–110)
LIPASE SERPL-CCNC: 22 U/L (ref 4–60)
POTASSIUM SERPL-SCNC: 4.2 MMOL/L (ref 3.5–5.1)
PROT SERPL-MCNC: 8.3 G/DL (ref 6–8.4)
SODIUM SERPL-SCNC: 138 MMOL/L (ref 136–145)

## 2021-05-23 DIAGNOSIS — E83.52 HYPERCALCEMIA: Primary | ICD-10-CM

## 2021-05-26 ENCOUNTER — LAB VISIT (OUTPATIENT)
Dept: LAB | Facility: HOSPITAL | Age: 46
End: 2021-05-26
Attending: FAMILY MEDICINE
Payer: COMMERCIAL

## 2021-05-26 ENCOUNTER — TELEPHONE (OUTPATIENT)
Dept: FAMILY MEDICINE | Facility: CLINIC | Age: 46
End: 2021-05-26

## 2021-05-26 DIAGNOSIS — E83.52 HYPERCALCEMIA: ICD-10-CM

## 2021-05-26 LAB — CA-I BLDV-SCNC: 1.28 MMOL/L (ref 1.06–1.42)

## 2021-05-26 PROCEDURE — 82330 ASSAY OF CALCIUM: CPT | Performed by: FAMILY MEDICINE

## 2021-05-26 PROCEDURE — 36415 COLL VENOUS BLD VENIPUNCTURE: CPT | Mod: PO | Performed by: FAMILY MEDICINE

## 2021-06-26 ENCOUNTER — LAB VISIT (OUTPATIENT)
Dept: URGENT CARE | Facility: CLINIC | Age: 46
End: 2021-06-26
Payer: COMMERCIAL

## 2021-06-26 DIAGNOSIS — Z01.818 PRE-OP TESTING: ICD-10-CM

## 2021-06-26 PROCEDURE — U0003 INFECTIOUS AGENT DETECTION BY NUCLEIC ACID (DNA OR RNA); SEVERE ACUTE RESPIRATORY SYNDROME CORONAVIRUS 2 (SARS-COV-2) (CORONAVIRUS DISEASE [COVID-19]), AMPLIFIED PROBE TECHNIQUE, MAKING USE OF HIGH THROUGHPUT TECHNOLOGIES AS DESCRIBED BY CMS-2020-01-R: HCPCS | Performed by: INTERNAL MEDICINE

## 2021-06-26 PROCEDURE — U0005 INFEC AGEN DETEC AMPLI PROBE: HCPCS | Performed by: INTERNAL MEDICINE

## 2021-06-28 LAB — SARS-COV-2 RNA RESP QL NAA+PROBE: NOT DETECTED

## 2021-06-29 ENCOUNTER — HOSPITAL ENCOUNTER (OUTPATIENT)
Facility: HOSPITAL | Age: 46
Discharge: HOME | End: 2021-06-29
Attending: INTERNAL MEDICINE | Admitting: INTERNAL MEDICINE
Payer: COMMERCIAL

## 2021-06-29 ENCOUNTER — ANESTHESIA EVENT (OUTPATIENT)
Dept: ENDOSCOPY | Facility: HOSPITAL | Age: 46
End: 2021-06-29
Payer: COMMERCIAL

## 2021-06-29 ENCOUNTER — ANESTHESIA (OUTPATIENT)
Dept: ENDOSCOPY | Facility: HOSPITAL | Age: 46
End: 2021-06-29
Payer: COMMERCIAL

## 2021-06-29 DIAGNOSIS — K21.9 GASTROESOPHAGEAL REFLUX DISEASE WITHOUT ESOPHAGITIS: Primary | ICD-10-CM

## 2021-06-29 DIAGNOSIS — Z12.11 ENCOUNTER FOR SCREENING COLONOSCOPY: ICD-10-CM

## 2021-06-29 DIAGNOSIS — K44.9 HIATAL HERNIA: ICD-10-CM

## 2021-06-29 PROCEDURE — 37000009 HC ANESTHESIA EA ADD 15 MINS: Performed by: INTERNAL MEDICINE

## 2021-06-29 PROCEDURE — 88305 TISSUE EXAM BY PATHOLOGIST: CPT | Mod: 59 | Performed by: PATHOLOGY

## 2021-06-29 PROCEDURE — 88342 IMHCHEM/IMCYTCHM 1ST ANTB: CPT | Mod: 26,,, | Performed by: PATHOLOGY

## 2021-06-29 PROCEDURE — 43239 PR EGD, FLEX, W/BIOPSY, SGL/MULTI: ICD-10-PCS | Mod: ,,, | Performed by: INTERNAL MEDICINE

## 2021-06-29 PROCEDURE — 43239 EGD BIOPSY SINGLE/MULTIPLE: CPT | Mod: ,,, | Performed by: INTERNAL MEDICINE

## 2021-06-29 PROCEDURE — 43239 EGD BIOPSY SINGLE/MULTIPLE: CPT | Performed by: INTERNAL MEDICINE

## 2021-06-29 PROCEDURE — 37000008 HC ANESTHESIA 1ST 15 MINUTES: Performed by: INTERNAL MEDICINE

## 2021-06-29 PROCEDURE — 27201012 HC FORCEPS, HOT/COLD, DISP: Performed by: INTERNAL MEDICINE

## 2021-06-29 PROCEDURE — 63600175 PHARM REV CODE 636 W HCPCS: Performed by: NURSE ANESTHETIST, CERTIFIED REGISTERED

## 2021-06-29 PROCEDURE — 88342 IMHCHEM/IMCYTCHM 1ST ANTB: CPT | Performed by: PATHOLOGY

## 2021-06-29 PROCEDURE — 88305 TISSUE EXAM BY PATHOLOGIST: ICD-10-PCS | Mod: 26,,, | Performed by: PATHOLOGY

## 2021-06-29 PROCEDURE — 88305 TISSUE EXAM BY PATHOLOGIST: CPT | Mod: 26,,, | Performed by: PATHOLOGY

## 2021-06-29 PROCEDURE — 88342 CHG IMMUNOCYTOCHEMISTRY: ICD-10-PCS | Mod: 26,,, | Performed by: PATHOLOGY

## 2021-06-29 PROCEDURE — 25000003 PHARM REV CODE 250: Performed by: NURSE ANESTHETIST, CERTIFIED REGISTERED

## 2021-06-29 RX ORDER — PANTOPRAZOLE SODIUM 20 MG/1
20 TABLET, DELAYED RELEASE ORAL DAILY
Qty: 30 TABLET | Refills: 11 | Status: SHIPPED | OUTPATIENT
Start: 2021-06-29 | End: 2021-10-28

## 2021-06-29 RX ORDER — LIDOCAINE HYDROCHLORIDE 10 MG/ML
INJECTION, SOLUTION EPIDURAL; INFILTRATION; INTRACAUDAL; PERINEURAL
Status: DISCONTINUED | OUTPATIENT
Start: 2021-06-29 | End: 2021-06-29

## 2021-06-29 RX ORDER — PROPOFOL 10 MG/ML
VIAL (ML) INTRAVENOUS
Status: DISCONTINUED | OUTPATIENT
Start: 2021-06-29 | End: 2021-06-29

## 2021-06-29 RX ORDER — SODIUM CHLORIDE, SODIUM LACTATE, POTASSIUM CHLORIDE, CALCIUM CHLORIDE 600; 310; 30; 20 MG/100ML; MG/100ML; MG/100ML; MG/100ML
INJECTION, SOLUTION INTRAVENOUS CONTINUOUS PRN
Status: DISCONTINUED | OUTPATIENT
Start: 2021-06-29 | End: 2021-06-29

## 2021-06-29 RX ADMIN — PROPOFOL 20 MG: 10 INJECTION, EMULSION INTRAVENOUS at 09:06

## 2021-06-29 RX ADMIN — LIDOCAINE HYDROCHLORIDE 50 MG: 10 INJECTION, SOLUTION EPIDURAL; INFILTRATION; INTRACAUDAL; PERINEURAL at 08:06

## 2021-06-29 RX ADMIN — PROPOFOL 20 MG: 10 INJECTION, EMULSION INTRAVENOUS at 08:06

## 2021-06-29 RX ADMIN — PROPOFOL 30 MG: 10 INJECTION, EMULSION INTRAVENOUS at 09:06

## 2021-06-29 RX ADMIN — PROPOFOL 70 MG: 10 INJECTION, EMULSION INTRAVENOUS at 08:06

## 2021-06-29 RX ADMIN — PROPOFOL 40 MG: 10 INJECTION, EMULSION INTRAVENOUS at 08:06

## 2021-06-29 RX ADMIN — PROPOFOL 60 MG: 10 INJECTION, EMULSION INTRAVENOUS at 08:06

## 2021-06-29 RX ADMIN — SODIUM CHLORIDE, SODIUM LACTATE, POTASSIUM CHLORIDE, AND CALCIUM CHLORIDE: 600; 310; 30; 20 INJECTION, SOLUTION INTRAVENOUS at 08:06

## 2021-06-30 ENCOUNTER — PATIENT MESSAGE (OUTPATIENT)
Dept: HEPATOLOGY | Facility: CLINIC | Age: 46
End: 2021-06-30

## 2021-06-30 VITALS
TEMPERATURE: 98 F | RESPIRATION RATE: 20 BRPM | SYSTOLIC BLOOD PRESSURE: 143 MMHG | HEART RATE: 59 BPM | BODY MASS INDEX: 31.31 KG/M2 | DIASTOLIC BLOOD PRESSURE: 68 MMHG | OXYGEN SATURATION: 95 % | WEIGHT: 230.81 LBS

## 2021-07-08 ENCOUNTER — PATIENT MESSAGE (OUTPATIENT)
Dept: HEPATOLOGY | Facility: CLINIC | Age: 46
End: 2021-07-08

## 2021-07-12 LAB
FINAL PATHOLOGIC DIAGNOSIS: NORMAL
GROSS: NORMAL
Lab: NORMAL
SUPPLEMENTAL DIAGNOSIS: NORMAL

## 2021-10-21 ENCOUNTER — PATIENT MESSAGE (OUTPATIENT)
Dept: FAMILY MEDICINE | Facility: CLINIC | Age: 46
End: 2021-10-21
Payer: COMMERCIAL

## 2021-10-21 DIAGNOSIS — E78.1 HYPERTRIGLYCERIDEMIA: ICD-10-CM

## 2021-10-21 DIAGNOSIS — Z00.00 WELL ADULT EXAM: ICD-10-CM

## 2021-10-21 DIAGNOSIS — E78.2 MIXED HYPERLIPIDEMIA: Primary | ICD-10-CM

## 2021-10-26 ENCOUNTER — LAB VISIT (OUTPATIENT)
Dept: LAB | Facility: HOSPITAL | Age: 46
End: 2021-10-26
Attending: FAMILY MEDICINE
Payer: COMMERCIAL

## 2021-10-26 DIAGNOSIS — Z00.00 WELL ADULT EXAM: ICD-10-CM

## 2021-10-26 DIAGNOSIS — Z87.891 QUIT SMOKING: ICD-10-CM

## 2021-10-26 DIAGNOSIS — E78.2 MIXED HYPERLIPIDEMIA: ICD-10-CM

## 2021-10-26 DIAGNOSIS — E78.1 HYPERTRIGLYCERIDEMIA: ICD-10-CM

## 2021-10-26 LAB
ALBUMIN SERPL BCP-MCNC: 4 G/DL (ref 3.5–5.2)
ALP SERPL-CCNC: 55 U/L (ref 55–135)
ALT SERPL W/O P-5'-P-CCNC: 45 U/L (ref 10–44)
ANION GAP SERPL CALC-SCNC: 9 MMOL/L (ref 8–16)
AST SERPL-CCNC: 32 U/L (ref 10–40)
BASOPHILS # BLD AUTO: 0.04 K/UL (ref 0–0.2)
BASOPHILS NFR BLD: 0.6 % (ref 0–1.9)
BILIRUB SERPL-MCNC: 1 MG/DL (ref 0.1–1)
BUN SERPL-MCNC: 10 MG/DL (ref 6–20)
CALCIUM SERPL-MCNC: 10 MG/DL (ref 8.7–10.5)
CHLORIDE SERPL-SCNC: 104 MMOL/L (ref 95–110)
CHOLEST SERPL-MCNC: 213 MG/DL (ref 120–199)
CHOLEST/HDLC SERPL: 7.1 {RATIO} (ref 2–5)
CO2 SERPL-SCNC: 26 MMOL/L (ref 23–29)
CREAT SERPL-MCNC: 1 MG/DL (ref 0.5–1.4)
DIFFERENTIAL METHOD: ABNORMAL
EOSINOPHIL # BLD AUTO: 0.1 K/UL (ref 0–0.5)
EOSINOPHIL NFR BLD: 0.9 % (ref 0–8)
ERYTHROCYTE [DISTWIDTH] IN BLOOD BY AUTOMATED COUNT: 12 % (ref 11.5–14.5)
EST. GFR  (AFRICAN AMERICAN): >60 ML/MIN/1.73 M^2
EST. GFR  (NON AFRICAN AMERICAN): >60 ML/MIN/1.73 M^2
GLUCOSE SERPL-MCNC: 95 MG/DL (ref 70–110)
HCT VFR BLD AUTO: 46.2 % (ref 40–54)
HDLC SERPL-MCNC: 30 MG/DL (ref 40–75)
HDLC SERPL: 14.1 % (ref 20–50)
HGB BLD-MCNC: 14.8 G/DL (ref 14–18)
IMM GRANULOCYTES # BLD AUTO: 0.01 K/UL (ref 0–0.04)
IMM GRANULOCYTES NFR BLD AUTO: 0.1 % (ref 0–0.5)
LDLC SERPL CALC-MCNC: ABNORMAL MG/DL (ref 63–159)
LYMPHOCYTES # BLD AUTO: 2.4 K/UL (ref 1–4.8)
LYMPHOCYTES NFR BLD: 33.9 % (ref 18–48)
MCH RBC QN AUTO: 31.3 PG (ref 27–31)
MCHC RBC AUTO-ENTMCNC: 32 G/DL (ref 32–36)
MCV RBC AUTO: 98 FL (ref 82–98)
MONOCYTES # BLD AUTO: 0.5 K/UL (ref 0.3–1)
MONOCYTES NFR BLD: 7.4 % (ref 4–15)
NEUTROPHILS # BLD AUTO: 4 K/UL (ref 1.8–7.7)
NEUTROPHILS NFR BLD: 57.1 % (ref 38–73)
NONHDLC SERPL-MCNC: 183 MG/DL
NRBC BLD-RTO: 0 /100 WBC
PLATELET # BLD AUTO: 248 K/UL (ref 150–450)
PMV BLD AUTO: 10.4 FL (ref 9.2–12.9)
POTASSIUM SERPL-SCNC: 4.7 MMOL/L (ref 3.5–5.1)
PROT SERPL-MCNC: 7.5 G/DL (ref 6–8.4)
RBC # BLD AUTO: 4.73 M/UL (ref 4.6–6.2)
SODIUM SERPL-SCNC: 139 MMOL/L (ref 136–145)
TRIGL SERPL-MCNC: 664 MG/DL (ref 30–150)
TSH SERPL DL<=0.005 MIU/L-ACNC: 2.87 UIU/ML (ref 0.4–4)
WBC # BLD AUTO: 7 K/UL (ref 3.9–12.7)

## 2021-10-26 PROCEDURE — 84443 ASSAY THYROID STIM HORMONE: CPT | Performed by: FAMILY MEDICINE

## 2021-10-26 PROCEDURE — 86803 HEPATITIS C AB TEST: CPT | Performed by: FAMILY MEDICINE

## 2021-10-26 PROCEDURE — 36415 COLL VENOUS BLD VENIPUNCTURE: CPT | Mod: PO | Performed by: FAMILY MEDICINE

## 2021-10-26 PROCEDURE — 80053 COMPREHEN METABOLIC PANEL: CPT | Performed by: FAMILY MEDICINE

## 2021-10-26 PROCEDURE — 85025 COMPLETE CBC W/AUTO DIFF WBC: CPT | Performed by: FAMILY MEDICINE

## 2021-10-26 PROCEDURE — 87389 HIV-1 AG W/HIV-1&-2 AB AG IA: CPT | Performed by: FAMILY MEDICINE

## 2021-10-26 PROCEDURE — 80061 LIPID PANEL: CPT | Performed by: FAMILY MEDICINE

## 2021-10-27 LAB
HCV AB SERPL QL IA: NEGATIVE
HIV 1+2 AB+HIV1 P24 AG SERPL QL IA: NEGATIVE

## 2021-10-28 ENCOUNTER — OFFICE VISIT (OUTPATIENT)
Dept: FAMILY MEDICINE | Facility: CLINIC | Age: 46
End: 2021-10-28
Payer: COMMERCIAL

## 2021-10-28 VITALS
BODY MASS INDEX: 31.19 KG/M2 | WEIGHT: 230.25 LBS | OXYGEN SATURATION: 96 % | HEART RATE: 84 BPM | HEIGHT: 72 IN | TEMPERATURE: 99 F | SYSTOLIC BLOOD PRESSURE: 114 MMHG | DIASTOLIC BLOOD PRESSURE: 86 MMHG

## 2021-10-28 DIAGNOSIS — E78.2 MIXED HYPERLIPIDEMIA: ICD-10-CM

## 2021-10-28 DIAGNOSIS — Z00.00 WELL ADULT EXAM: Primary | ICD-10-CM

## 2021-10-28 PROCEDURE — 3008F PR BODY MASS INDEX (BMI) DOCUMENTED: ICD-10-PCS | Mod: CPTII,S$GLB,, | Performed by: FAMILY MEDICINE

## 2021-10-28 PROCEDURE — 99999 PR PBB SHADOW E&M-EST. PATIENT-LVL III: ICD-10-PCS | Mod: PBBFAC,,, | Performed by: FAMILY MEDICINE

## 2021-10-28 PROCEDURE — 1159F MED LIST DOCD IN RCRD: CPT | Mod: CPTII,S$GLB,, | Performed by: FAMILY MEDICINE

## 2021-10-28 PROCEDURE — 1159F PR MEDICATION LIST DOCUMENTED IN MEDICAL RECORD: ICD-10-PCS | Mod: CPTII,S$GLB,, | Performed by: FAMILY MEDICINE

## 2021-10-28 PROCEDURE — 3074F SYST BP LT 130 MM HG: CPT | Mod: CPTII,S$GLB,, | Performed by: FAMILY MEDICINE

## 2021-10-28 PROCEDURE — 99999 PR PBB SHADOW E&M-EST. PATIENT-LVL III: CPT | Mod: PBBFAC,,, | Performed by: FAMILY MEDICINE

## 2021-10-28 PROCEDURE — 3079F PR MOST RECENT DIASTOLIC BLOOD PRESSURE 80-89 MM HG: ICD-10-PCS | Mod: CPTII,S$GLB,, | Performed by: FAMILY MEDICINE

## 2021-10-28 PROCEDURE — 99396 PR PREVENTIVE VISIT,EST,40-64: ICD-10-PCS | Mod: S$GLB,,, | Performed by: FAMILY MEDICINE

## 2021-10-28 PROCEDURE — 99396 PREV VISIT EST AGE 40-64: CPT | Mod: S$GLB,,, | Performed by: FAMILY MEDICINE

## 2021-10-28 PROCEDURE — 3008F BODY MASS INDEX DOCD: CPT | Mod: CPTII,S$GLB,, | Performed by: FAMILY MEDICINE

## 2021-10-28 PROCEDURE — 3079F DIAST BP 80-89 MM HG: CPT | Mod: CPTII,S$GLB,, | Performed by: FAMILY MEDICINE

## 2021-10-28 PROCEDURE — 3074F PR MOST RECENT SYSTOLIC BLOOD PRESSURE < 130 MM HG: ICD-10-PCS | Mod: CPTII,S$GLB,, | Performed by: FAMILY MEDICINE

## 2021-10-28 RX ORDER — FENOFIBRATE 160 MG/1
160 TABLET ORAL DAILY
Qty: 90 TABLET | Refills: 3 | Status: SHIPPED | OUTPATIENT
Start: 2021-10-28 | End: 2022-10-12

## 2021-10-28 RX ORDER — SILDENAFIL CITRATE 20 MG/1
TABLET ORAL
COMMUNITY
Start: 2021-07-13 | End: 2021-10-28

## 2022-01-20 ENCOUNTER — PATIENT MESSAGE (OUTPATIENT)
Dept: FAMILY MEDICINE | Facility: CLINIC | Age: 47
End: 2022-01-20
Payer: COMMERCIAL

## 2022-10-10 ENCOUNTER — PATIENT MESSAGE (OUTPATIENT)
Dept: FAMILY MEDICINE | Facility: CLINIC | Age: 47
End: 2022-10-10
Payer: COMMERCIAL

## 2022-10-11 ENCOUNTER — LAB VISIT (OUTPATIENT)
Dept: LAB | Facility: HOSPITAL | Age: 47
End: 2022-10-11
Attending: FAMILY MEDICINE
Payer: COMMERCIAL

## 2022-10-11 DIAGNOSIS — E78.2 MIXED HYPERLIPIDEMIA: ICD-10-CM

## 2022-10-11 DIAGNOSIS — Z00.00 WELL ADULT EXAM: ICD-10-CM

## 2022-10-11 LAB
ALBUMIN SERPL BCP-MCNC: 4.2 G/DL (ref 3.5–5.2)
ALP SERPL-CCNC: 62 U/L (ref 55–135)
ALT SERPL W/O P-5'-P-CCNC: 44 U/L (ref 10–44)
ANION GAP SERPL CALC-SCNC: 9 MMOL/L (ref 8–16)
AST SERPL-CCNC: 25 U/L (ref 10–40)
BILIRUB SERPL-MCNC: 0.9 MG/DL (ref 0.1–1)
BUN SERPL-MCNC: 14 MG/DL (ref 6–20)
CALCIUM SERPL-MCNC: 10.2 MG/DL (ref 8.7–10.5)
CHLORIDE SERPL-SCNC: 102 MMOL/L (ref 95–110)
CHOLEST SERPL-MCNC: 238 MG/DL (ref 120–199)
CHOLEST/HDLC SERPL: 7.2 {RATIO} (ref 2–5)
CO2 SERPL-SCNC: 26 MMOL/L (ref 23–29)
CREAT SERPL-MCNC: 0.9 MG/DL (ref 0.5–1.4)
EST. GFR  (NO RACE VARIABLE): >60 ML/MIN/1.73 M^2
GLUCOSE SERPL-MCNC: 96 MG/DL (ref 70–110)
HDLC SERPL-MCNC: 33 MG/DL (ref 40–75)
HDLC SERPL: 13.9 % (ref 20–50)
LDLC SERPL CALC-MCNC: ABNORMAL MG/DL (ref 63–159)
NONHDLC SERPL-MCNC: 205 MG/DL
POTASSIUM SERPL-SCNC: 4.3 MMOL/L (ref 3.5–5.1)
PROT SERPL-MCNC: 7.3 G/DL (ref 6–8.4)
SODIUM SERPL-SCNC: 137 MMOL/L (ref 136–145)
TRIGL SERPL-MCNC: 803 MG/DL (ref 30–150)

## 2022-10-11 PROCEDURE — 80053 COMPREHEN METABOLIC PANEL: CPT | Performed by: FAMILY MEDICINE

## 2022-10-11 PROCEDURE — 36415 COLL VENOUS BLD VENIPUNCTURE: CPT | Mod: PO | Performed by: FAMILY MEDICINE

## 2022-10-11 PROCEDURE — 80061 LIPID PANEL: CPT | Performed by: FAMILY MEDICINE

## 2022-10-12 ENCOUNTER — OFFICE VISIT (OUTPATIENT)
Dept: FAMILY MEDICINE | Facility: CLINIC | Age: 47
End: 2022-10-12
Payer: COMMERCIAL

## 2022-10-12 VITALS
SYSTOLIC BLOOD PRESSURE: 116 MMHG | HEART RATE: 100 BPM | DIASTOLIC BLOOD PRESSURE: 82 MMHG | HEIGHT: 72 IN | OXYGEN SATURATION: 96 % | WEIGHT: 233.94 LBS | TEMPERATURE: 98 F | BODY MASS INDEX: 31.69 KG/M2

## 2022-10-12 DIAGNOSIS — Z12.11 COLON CANCER SCREENING: ICD-10-CM

## 2022-10-12 DIAGNOSIS — E78.1 HYPERTRIGLYCERIDEMIA: ICD-10-CM

## 2022-10-12 DIAGNOSIS — Z00.00 WELL ADULT EXAM: Primary | ICD-10-CM

## 2022-10-12 PROCEDURE — 3074F SYST BP LT 130 MM HG: CPT | Mod: CPTII,S$GLB,, | Performed by: FAMILY MEDICINE

## 2022-10-12 PROCEDURE — 1159F MED LIST DOCD IN RCRD: CPT | Mod: CPTII,S$GLB,, | Performed by: FAMILY MEDICINE

## 2022-10-12 PROCEDURE — 3079F PR MOST RECENT DIASTOLIC BLOOD PRESSURE 80-89 MM HG: ICD-10-PCS | Mod: CPTII,S$GLB,, | Performed by: FAMILY MEDICINE

## 2022-10-12 PROCEDURE — 99396 PR PREVENTIVE VISIT,EST,40-64: ICD-10-PCS | Mod: S$GLB,,, | Performed by: FAMILY MEDICINE

## 2022-10-12 PROCEDURE — 3079F DIAST BP 80-89 MM HG: CPT | Mod: CPTII,S$GLB,, | Performed by: FAMILY MEDICINE

## 2022-10-12 PROCEDURE — 99396 PREV VISIT EST AGE 40-64: CPT | Mod: S$GLB,,, | Performed by: FAMILY MEDICINE

## 2022-10-12 PROCEDURE — 99999 PR PBB SHADOW E&M-EST. PATIENT-LVL III: ICD-10-PCS | Mod: PBBFAC,,, | Performed by: FAMILY MEDICINE

## 2022-10-12 PROCEDURE — 1159F PR MEDICATION LIST DOCUMENTED IN MEDICAL RECORD: ICD-10-PCS | Mod: CPTII,S$GLB,, | Performed by: FAMILY MEDICINE

## 2022-10-12 PROCEDURE — 3008F PR BODY MASS INDEX (BMI) DOCUMENTED: ICD-10-PCS | Mod: CPTII,S$GLB,, | Performed by: FAMILY MEDICINE

## 2022-10-12 PROCEDURE — 3074F PR MOST RECENT SYSTOLIC BLOOD PRESSURE < 130 MM HG: ICD-10-PCS | Mod: CPTII,S$GLB,, | Performed by: FAMILY MEDICINE

## 2022-10-12 PROCEDURE — 3008F BODY MASS INDEX DOCD: CPT | Mod: CPTII,S$GLB,, | Performed by: FAMILY MEDICINE

## 2022-10-12 PROCEDURE — 99999 PR PBB SHADOW E&M-EST. PATIENT-LVL III: CPT | Mod: PBBFAC,,, | Performed by: FAMILY MEDICINE

## 2022-10-12 NOTE — PROGRESS NOTES
"  Chief Complaint:    Chief Complaint   Patient presents with    Annual Exam       History of Present Illness:  Patient with bradycardia, HLD, and GERD presents today for an annual follow up.     Rejoined the gym but slacked off due to work. Plans to start going more.   Only takes flax seed oil.   Due for colonoscopy.   Cholesterol 238. Triglycerides are 803. Stopped fenofibrate due to myalgias.   Denies CP, SOB, or constipation.       ROS:  Review of Systems   Constitutional:  Negative for appetite change, chills and fever.   HENT:  Negative for congestion, ear pain, postnasal drip, rhinorrhea, sinus pressure and sinus pain.    Eyes:  Negative for pain.   Respiratory:  Negative for cough, chest tightness and shortness of breath.    Cardiovascular:  Negative for chest pain and palpitations.   Gastrointestinal:  Negative for abdominal pain, blood in stool, constipation, diarrhea and nausea.   Genitourinary:  Negative for difficulty urinating, dysuria, flank pain and hematuria.   Musculoskeletal:  Negative for arthralgias and myalgias.   Skin:  Negative for pallor and wound.   Neurological:  Negative for dizziness, tremors, speech difficulty, light-headedness and headaches.   Psychiatric/Behavioral:  Negative for behavioral problems, dysphoric mood and sleep disturbance. The patient is not nervous/anxious.    All other systems reviewed and are negative.    Past Medical History:   Diagnosis Date    Anxiety     General anesthetics causing adverse effect in therapeutic use     recent sx achilles tendon, given "something to dry up my sinuses and I couldn't hardly breath"    Hyperlipidemia        Social History:  Social History     Socioeconomic History    Marital status:    Tobacco Use    Smoking status: Former     Packs/day: 1.00     Years: 17.00     Pack years: 17.00     Types: Vaping with nicotine, Cigarettes     Quit date: 2020     Years since quittin.2    Smokeless tobacco: Never    Tobacco comments: "     no smoking after m.n prior to surgery   Substance and Sexual Activity    Alcohol use: Yes     Alcohol/week: 1.7 standard drinks     Types: 2 Standard drinks or equivalent per week     Comment: social  No alcohol 72h prior to sx    Drug use: No    Sexual activity: Yes     Partners: Female     Birth control/protection: None     Social Determinants of Health     Financial Resource Strain: Unknown    Difficulty of Paying Living Expenses: Patient refused   Food Insecurity: Unknown    Worried About Running Out of Food in the Last Year: Patient refused    Ran Out of Food in the Last Year: Patient refused   Transportation Needs: Unknown    Lack of Transportation (Medical): Patient refused    Lack of Transportation (Non-Medical): Patient refused   Physical Activity: Sufficiently Active    Days of Exercise per Week: 4 days    Minutes of Exercise per Session: 40 min   Stress: No Stress Concern Present    Feeling of Stress : Only a little   Social Connections: Unknown    Frequency of Communication with Friends and Family: Patient refused    Frequency of Social Gatherings with Friends and Family: Patient refused    Active Member of Clubs or Organizations: Patient refused    Attends Club or Organization Meetings: Patient refused    Marital Status:    Housing Stability: Unknown    Unable to Pay for Housing in the Last Year: Patient refused    Number of Places Lived in the Last Year: 1    Unstable Housing in the Last Year: Patient refused       Family History:   family history includes Heart disease in his father.    Health Maintenance   Topic Date Due    Lipid Panel  10/11/2023    TETANUS VACCINE  08/25/2026    Hepatitis C Screening  Completed       Physical Exam:    Vital Signs  Temp: 98.2 °F (36.8 °C)  Pulse: 100  SpO2: 96 %  BP: 116/82  Pain Score: 0-No pain  Height and Weight  Height: 6' (182.9 cm)  Weight: 106.1 kg (233 lb 14.5 oz)  BSA (Calculated - sq m): 2.32 sq meters  BMI (Calculated): 31.7  Weight in (lb) to  have BMI = 25: 183.9]    Body mass index is 31.72 kg/m².    Physical Exam  Vitals and nursing note reviewed.   Constitutional:       Appearance: Normal appearance.   HENT:      Head: Normocephalic and atraumatic.      Right Ear: Tympanic membrane normal.      Left Ear: Tympanic membrane normal.   Eyes:      Extraocular Movements: Extraocular movements intact.      Pupils: Pupils are equal, round, and reactive to light.   Cardiovascular:      Rate and Rhythm: Normal rate and regular rhythm.      Pulses: Normal pulses.      Heart sounds: Normal heart sounds. No murmur heard.    No gallop.   Pulmonary:      Effort: Pulmonary effort is normal. No respiratory distress.      Breath sounds: Normal breath sounds. No wheezing, rhonchi or rales.   Abdominal:      General: There is no distension.      Palpations: Abdomen is soft.      Tenderness: There is no abdominal tenderness.   Musculoskeletal:         General: No swelling, deformity or signs of injury. Normal range of motion.      Cervical back: Normal range of motion.   Skin:     General: Skin is warm and dry.      Capillary Refill: Capillary refill takes less than 2 seconds.      Coloration: Skin is not jaundiced or pale.   Neurological:      General: No focal deficit present.      Mental Status: He is alert and oriented to person, place, and time.   Psychiatric:         Mood and Affect: Mood normal.         Behavior: Behavior normal.         Assessment:      ICD-10-CM ICD-9-CM   1. Well adult exam  Z00.00 V70.0   2. Colon cancer screening  Z12.11 V76.51   3. Hypertriglyceridemia  E78.1 272.1     Plan:  Continue exercise.   Schedule colonoscopy.  Take fish oil three times per day. Discussed carb control and cutting back on fatty foods to lower his cholesterol.   Sign work clearance form.   Follow up 1 year or sooner with labs.   Orders Placed This Encounter   Procedures    Ambulatory referral/consult to Endo Procedure      Current Outpatient Medications    Medication Sig Dispense Refill    FLAXSEED ORAL Take by mouth.       No current facility-administered medications for this visit.     Medications Discontinued During This Encounter   Medication Reason    fenofibrate 160 MG Tab        Follow up in about 1 year (around 10/12/2023).      Opal Lara MD  Scribe Attestation:   I, Lori Jiang, am scribing for, and in the presence of, Dr.Arif Lara I performed the above scribed service and the documentation accurately describes the services I performed. I attest to the accuracy of the note.    I, Dr. Opal Lara, reviewed documentation as scribed above. I performed the services described in this documentation.  I agree that the record reflects my personal performance and is accurate and complete. Opal Lara MD.  10/12/2022

## 2022-10-18 ENCOUNTER — HOSPITAL ENCOUNTER (OUTPATIENT)
Dept: PREADMISSION TESTING | Facility: HOSPITAL | Age: 47
Discharge: HOME | End: 2022-10-18
Payer: COMMERCIAL

## 2022-10-18 DIAGNOSIS — Z12.11 COLON CANCER SCREENING: ICD-10-CM

## 2023-04-19 ENCOUNTER — PATIENT MESSAGE (OUTPATIENT)
Dept: ADMINISTRATIVE | Facility: HOSPITAL | Age: 48
End: 2023-04-19
Payer: COMMERCIAL

## 2023-05-24 NOTE — TELEPHONE ENCOUNTER
Dx: neck mass  Proc:  Excision of neck mass    OK to see PA 7 days after    Per anesthesia guidelines, does not need any preop labs.   28.4

## 2023-10-10 ENCOUNTER — PATIENT MESSAGE (OUTPATIENT)
Dept: FAMILY MEDICINE | Facility: CLINIC | Age: 48
End: 2023-10-10
Payer: COMMERCIAL

## 2023-10-12 ENCOUNTER — LAB VISIT (OUTPATIENT)
Dept: LAB | Facility: HOSPITAL | Age: 48
End: 2023-10-12
Attending: FAMILY MEDICINE
Payer: COMMERCIAL

## 2023-10-12 ENCOUNTER — TELEPHONE (OUTPATIENT)
Dept: FAMILY MEDICINE | Facility: CLINIC | Age: 48
End: 2023-10-12
Payer: COMMERCIAL

## 2023-10-12 DIAGNOSIS — E78.1 HYPERTRIGLYCERIDEMIA: ICD-10-CM

## 2023-10-12 DIAGNOSIS — E78.2 MIXED HYPERLIPIDEMIA: ICD-10-CM

## 2023-10-12 DIAGNOSIS — Z00.00 WELL ADULT EXAM: Primary | ICD-10-CM

## 2023-10-12 DIAGNOSIS — Z00.00 WELL ADULT EXAM: ICD-10-CM

## 2023-10-12 LAB
ALBUMIN SERPL BCP-MCNC: 3.9 G/DL (ref 3.5–5.2)
ALP SERPL-CCNC: 59 U/L (ref 55–135)
ALT SERPL W/O P-5'-P-CCNC: 44 U/L (ref 10–44)
ANION GAP SERPL CALC-SCNC: 8 MMOL/L (ref 8–16)
AST SERPL-CCNC: 24 U/L (ref 10–40)
BASOPHILS # BLD AUTO: 0.03 K/UL (ref 0–0.2)
BASOPHILS NFR BLD: 0.4 % (ref 0–1.9)
BILIRUB SERPL-MCNC: 1.3 MG/DL (ref 0.1–1)
BUN SERPL-MCNC: 13 MG/DL (ref 6–20)
CALCIUM SERPL-MCNC: 9.5 MG/DL (ref 8.7–10.5)
CHLORIDE SERPL-SCNC: 106 MMOL/L (ref 95–110)
CHOLEST SERPL-MCNC: 215 MG/DL (ref 120–199)
CHOLEST/HDLC SERPL: 6.3 {RATIO} (ref 2–5)
CO2 SERPL-SCNC: 24 MMOL/L (ref 23–29)
CREAT SERPL-MCNC: 1 MG/DL (ref 0.5–1.4)
DIFFERENTIAL METHOD: NORMAL
EOSINOPHIL # BLD AUTO: 0.1 K/UL (ref 0–0.5)
EOSINOPHIL NFR BLD: 1.2 % (ref 0–8)
ERYTHROCYTE [DISTWIDTH] IN BLOOD BY AUTOMATED COUNT: 11.7 % (ref 11.5–14.5)
EST. GFR  (NO RACE VARIABLE): >60 ML/MIN/1.73 M^2
ESTIMATED AVG GLUCOSE: 105 MG/DL (ref 68–131)
GLUCOSE SERPL-MCNC: 96 MG/DL (ref 70–110)
HBA1C MFR BLD: 5.3 % (ref 4–5.6)
HCT VFR BLD AUTO: 45.3 % (ref 40–54)
HDLC SERPL-MCNC: 34 MG/DL (ref 40–75)
HDLC SERPL: 15.8 % (ref 20–50)
HGB BLD-MCNC: 14.7 G/DL (ref 14–18)
IMM GRANULOCYTES # BLD AUTO: 0.03 K/UL (ref 0–0.04)
IMM GRANULOCYTES NFR BLD AUTO: 0.4 % (ref 0–0.5)
LDLC SERPL CALC-MCNC: ABNORMAL MG/DL (ref 63–159)
LYMPHOCYTES # BLD AUTO: 2.2 K/UL (ref 1–4.8)
LYMPHOCYTES NFR BLD: 33 % (ref 18–48)
MCH RBC QN AUTO: 30.9 PG (ref 27–31)
MCHC RBC AUTO-ENTMCNC: 32.5 G/DL (ref 32–36)
MCV RBC AUTO: 95 FL (ref 82–98)
MONOCYTES # BLD AUTO: 0.5 K/UL (ref 0.3–1)
MONOCYTES NFR BLD: 6.9 % (ref 4–15)
NEUTROPHILS # BLD AUTO: 3.9 K/UL (ref 1.8–7.7)
NEUTROPHILS NFR BLD: 58.1 % (ref 38–73)
NONHDLC SERPL-MCNC: 181 MG/DL
NRBC BLD-RTO: 0 /100 WBC
PLATELET # BLD AUTO: 250 K/UL (ref 150–450)
PMV BLD AUTO: 10.6 FL (ref 9.2–12.9)
POTASSIUM SERPL-SCNC: 4 MMOL/L (ref 3.5–5.1)
PROT SERPL-MCNC: 7.2 G/DL (ref 6–8.4)
RBC # BLD AUTO: 4.76 M/UL (ref 4.6–6.2)
SODIUM SERPL-SCNC: 138 MMOL/L (ref 136–145)
TRIGL SERPL-MCNC: 408 MG/DL (ref 30–150)
TSH SERPL DL<=0.005 MIU/L-ACNC: 2.2 UIU/ML (ref 0.4–4)
WBC # BLD AUTO: 6.7 K/UL (ref 3.9–12.7)

## 2023-10-12 PROCEDURE — 80061 LIPID PANEL: CPT | Performed by: FAMILY MEDICINE

## 2023-10-12 PROCEDURE — 84443 ASSAY THYROID STIM HORMONE: CPT | Performed by: FAMILY MEDICINE

## 2023-10-12 PROCEDURE — 83036 HEMOGLOBIN GLYCOSYLATED A1C: CPT | Performed by: FAMILY MEDICINE

## 2023-10-12 PROCEDURE — 85025 COMPLETE CBC W/AUTO DIFF WBC: CPT | Performed by: FAMILY MEDICINE

## 2023-10-12 PROCEDURE — 80053 COMPREHEN METABOLIC PANEL: CPT | Performed by: FAMILY MEDICINE

## 2023-10-12 PROCEDURE — 36415 COLL VENOUS BLD VENIPUNCTURE: CPT | Mod: PO | Performed by: FAMILY MEDICINE

## 2023-10-17 ENCOUNTER — OFFICE VISIT (OUTPATIENT)
Dept: FAMILY MEDICINE | Facility: CLINIC | Age: 48
End: 2023-10-17
Payer: COMMERCIAL

## 2023-10-17 ENCOUNTER — LAB VISIT (OUTPATIENT)
Dept: LAB | Facility: HOSPITAL | Age: 48
End: 2023-10-17
Attending: FAMILY MEDICINE
Payer: COMMERCIAL

## 2023-10-17 VITALS
HEART RATE: 62 BPM | BODY MASS INDEX: 30.57 KG/M2 | SYSTOLIC BLOOD PRESSURE: 120 MMHG | WEIGHT: 225.44 LBS | DIASTOLIC BLOOD PRESSURE: 82 MMHG | OXYGEN SATURATION: 93 %

## 2023-10-17 DIAGNOSIS — R17 SERUM TOTAL BILIRUBIN ELEVATED: ICD-10-CM

## 2023-10-17 DIAGNOSIS — Z00.00 WELL ADULT EXAM: Primary | ICD-10-CM

## 2023-10-17 DIAGNOSIS — E78.2 MIXED HYPERLIPIDEMIA: ICD-10-CM

## 2023-10-17 DIAGNOSIS — Z12.11 COLON CANCER SCREENING: ICD-10-CM

## 2023-10-17 LAB
BILIRUB DIRECT SERPL-MCNC: 0.4 MG/DL (ref 0.1–0.3)
BILIRUB SERPL-MCNC: 1.8 MG/DL (ref 0.1–1)

## 2023-10-17 PROCEDURE — 82248 BILIRUBIN DIRECT: CPT | Performed by: FAMILY MEDICINE

## 2023-10-17 PROCEDURE — 3044F PR MOST RECENT HEMOGLOBIN A1C LEVEL <7.0%: ICD-10-PCS | Mod: CPTII,S$GLB,, | Performed by: FAMILY MEDICINE

## 2023-10-17 PROCEDURE — 3008F PR BODY MASS INDEX (BMI) DOCUMENTED: ICD-10-PCS | Mod: CPTII,S$GLB,, | Performed by: FAMILY MEDICINE

## 2023-10-17 PROCEDURE — 1159F PR MEDICATION LIST DOCUMENTED IN MEDICAL RECORD: ICD-10-PCS | Mod: CPTII,S$GLB,, | Performed by: FAMILY MEDICINE

## 2023-10-17 PROCEDURE — 99999 PR PBB SHADOW E&M-EST. PATIENT-LVL III: CPT | Mod: PBBFAC,,, | Performed by: FAMILY MEDICINE

## 2023-10-17 PROCEDURE — 99396 PREV VISIT EST AGE 40-64: CPT | Mod: S$GLB,,, | Performed by: FAMILY MEDICINE

## 2023-10-17 PROCEDURE — 3074F SYST BP LT 130 MM HG: CPT | Mod: CPTII,S$GLB,, | Performed by: FAMILY MEDICINE

## 2023-10-17 PROCEDURE — 99999 PR PBB SHADOW E&M-EST. PATIENT-LVL III: ICD-10-PCS | Mod: PBBFAC,,, | Performed by: FAMILY MEDICINE

## 2023-10-17 PROCEDURE — 99396 PR PREVENTIVE VISIT,EST,40-64: ICD-10-PCS | Mod: S$GLB,,, | Performed by: FAMILY MEDICINE

## 2023-10-17 PROCEDURE — 3008F BODY MASS INDEX DOCD: CPT | Mod: CPTII,S$GLB,, | Performed by: FAMILY MEDICINE

## 2023-10-17 PROCEDURE — 3079F PR MOST RECENT DIASTOLIC BLOOD PRESSURE 80-89 MM HG: ICD-10-PCS | Mod: CPTII,S$GLB,, | Performed by: FAMILY MEDICINE

## 2023-10-17 PROCEDURE — 1159F MED LIST DOCD IN RCRD: CPT | Mod: CPTII,S$GLB,, | Performed by: FAMILY MEDICINE

## 2023-10-17 PROCEDURE — 3074F PR MOST RECENT SYSTOLIC BLOOD PRESSURE < 130 MM HG: ICD-10-PCS | Mod: CPTII,S$GLB,, | Performed by: FAMILY MEDICINE

## 2023-10-17 PROCEDURE — 3079F DIAST BP 80-89 MM HG: CPT | Mod: CPTII,S$GLB,, | Performed by: FAMILY MEDICINE

## 2023-10-17 PROCEDURE — 3044F HG A1C LEVEL LT 7.0%: CPT | Mod: CPTII,S$GLB,, | Performed by: FAMILY MEDICINE

## 2023-10-17 PROCEDURE — 82247 BILIRUBIN TOTAL: CPT | Performed by: FAMILY MEDICINE

## 2023-10-17 PROCEDURE — 36415 COLL VENOUS BLD VENIPUNCTURE: CPT | Mod: PO | Performed by: FAMILY MEDICINE

## 2023-10-17 NOTE — PROGRESS NOTES
"  Chief Complaint:    Chief Complaint   Patient presents with    Annual Exam     Annual exam       History of Present Illness:  Patient with bradycardia, HLD, and GERD presents today for an annual follow up.     Doing well, blood pressure stable at home.    A1C stable, CBC normal, Liver/kidney function good, slight elevation of bilirubin. hx of HLD not on statin therapy due to statin intolerance; Cholesterol 215. Triglycerides are 408.  Only takes flax seed oil.     Rejoined the gym but slacked off due to L shoulder/elbow pain. Will follow with ortho.    Denies CP, SOB, or constipation.     Due for colon cancer screening.    ROS:  Review of Systems   Constitutional:  Negative for appetite change, chills and fever.   HENT:  Negative for congestion, ear pain, postnasal drip, rhinorrhea, sinus pressure and sinus pain.    Eyes:  Negative for pain.   Respiratory:  Negative for cough, chest tightness and shortness of breath.    Cardiovascular:  Negative for chest pain and palpitations.   Gastrointestinal:  Negative for abdominal pain, blood in stool, constipation, diarrhea and nausea.   Genitourinary:  Negative for difficulty urinating, dysuria, flank pain and hematuria.   Musculoskeletal:  Negative for arthralgias and myalgias.   Skin:  Negative for pallor and wound.   Neurological:  Negative for dizziness, tremors, speech difficulty, light-headedness and headaches.   Psychiatric/Behavioral:  Negative for behavioral problems, dysphoric mood and sleep disturbance. The patient is not nervous/anxious.    All other systems reviewed and are negative.      Past Medical History:   Diagnosis Date    Anxiety     General anesthetics causing adverse effect in therapeutic use     recent sx achilles tendon, given "something to dry up my sinuses and I couldn't hardly breath"    Hyperlipidemia        Social History:  Social History     Socioeconomic History    Marital status:    Tobacco Use    Smoking status: Former     Current " packs/day: 0.00     Average packs/day: 1 pack/day for 17.0 years (17.0 ttl pk-yrs)     Types: Vaping with nicotine, Cigarettes     Start date: 6/29/2003     Quit date: 6/29/2020     Years since quitting: 3.3    Smokeless tobacco: Never    Tobacco comments:     no smoking after m.n prior to surgery   Substance and Sexual Activity    Alcohol use: Yes     Alcohol/week: 1.7 standard drinks of alcohol     Types: 2 Standard drinks or equivalent per week     Comment: social  No alcohol 72h prior to sx    Drug use: No    Sexual activity: Yes     Partners: Female     Birth control/protection: None     Social Determinants of Health     Financial Resource Strain: Unknown (10/10/2023)    Overall Financial Resource Strain (CARDIA)     Difficulty of Paying Living Expenses: Patient refused   Food Insecurity: Unknown (10/10/2023)    Hunger Vital Sign     Worried About Running Out of Food in the Last Year: Patient refused     Ran Out of Food in the Last Year: Patient refused   Transportation Needs: Unknown (10/10/2023)    PRAPARE - Transportation     Lack of Transportation (Medical): Patient refused     Lack of Transportation (Non-Medical): Patient refused   Physical Activity: Sufficiently Active (10/10/2023)    Exercise Vital Sign     Days of Exercise per Week: 4 days     Minutes of Exercise per Session: 40 min   Stress: No Stress Concern Present (10/10/2023)    [employer] [place] [employer] - Occupational Stress Questionnaire     Feeling of Stress : Not at all   Social Connections: Unknown (10/10/2023)    Social Connection and Isolation Panel [NHANES]     Frequency of Communication with Friends and Family: Patient refused     Frequency of Social Gatherings with Friends and Family: Patient refused     Active Member of Clubs or Organizations: Yes     Attends Club or Organization Meetings: Patient refused     Marital Status:    Housing Stability: Unknown (10/10/2023)    Housing Stability Vital Sign     Unable to Pay  for Housing in the Last Year: Patient refused     Unstable Housing in the Last Year: Patient refused       Family History:   family history includes Heart disease in his father.    Health Maintenance   Topic Date Due    Colorectal Cancer Screening  Never done    Lipid Panel  10/12/2024    TETANUS VACCINE  08/25/2026    Hepatitis C Screening  Completed       Physical Exam:    Vital Signs  Pulse: 62  SpO2: (!) 93 %  BP: 120/82  BP Location: Left arm  Patient Position: Sitting  Pain Score: 0-No pain  Height and Weight  Weight: 102.3 kg (225 lb 6.7 oz)]    Body mass index is 30.57 kg/m².    Physical Exam  Vitals and nursing note reviewed.   Constitutional:       Appearance: Normal appearance.   HENT:      Head: Normocephalic and atraumatic.      Right Ear: Tympanic membrane normal.      Left Ear: Tympanic membrane normal.   Eyes:      Extraocular Movements: Extraocular movements intact.      Pupils: Pupils are equal, round, and reactive to light.   Cardiovascular:      Rate and Rhythm: Normal rate and regular rhythm.      Pulses: Normal pulses.      Heart sounds: Normal heart sounds. No murmur heard.     No gallop.   Pulmonary:      Effort: Pulmonary effort is normal. No respiratory distress.      Breath sounds: Normal breath sounds. No wheezing, rhonchi or rales.   Abdominal:      General: There is no distension.      Palpations: Abdomen is soft.      Tenderness: There is no abdominal tenderness.   Musculoskeletal:         General: No swelling, deformity or signs of injury. Normal range of motion.      Cervical back: Normal range of motion.   Skin:     General: Skin is warm and dry.      Capillary Refill: Capillary refill takes less than 2 seconds.      Coloration: Skin is not jaundiced or pale.   Neurological:      General: No focal deficit present.      Mental Status: He is alert and oriented to person, place, and time.   Psychiatric:         Mood and Affect: Mood normal.         Behavior: Behavior normal.            Assessment:      ICD-10-CM ICD-9-CM   1. Well adult exam  Z00.00 V70.0   2. Colon cancer screening  Z12.11 V76.51   3. Mixed hyperlipidemia  E78.2 272.2   4. Serum total bilirubin elevated  R17 277.4       Plan:  Continue exercise.   Sign work clearance form.   Schedule colonoscopy.   Recheck bilirubin levels.  Follow up 1 year or sooner with labs.     Orders Placed This Encounter   Procedures    Bilirubin, Total    Bilirubin, Direct    Ambulatory referral/consult to Endo Procedure      Current Outpatient Medications   Medication Sig Dispense Refill    FLAXSEED ORAL Take by mouth.       No current facility-administered medications for this visit.     There are no discontinued medications.      Follow up in about 1 year (around 10/17/2024).      Opal Lara MD  Scribe Attestation:   I, Temo Jessica, am scribing for, and in the presence of, Dr.Arif Lara I performed the above scribed service and the documentation accurately describes the services I performed. I attest to the accuracy of the note.    I, Dr. Opal Lara, reviewed documentation as scribed above. I performed the services described in this documentation.  I agree that the record reflects my personal performance and is accurate and complete. Opal Lara MD.  10/17/2023

## 2023-10-19 ENCOUNTER — TELEPHONE (OUTPATIENT)
Dept: FAMILY MEDICINE | Facility: CLINIC | Age: 48
End: 2023-10-19
Payer: COMMERCIAL

## 2023-10-19 DIAGNOSIS — R17 ELEVATED BILIRUBIN: Primary | ICD-10-CM

## 2023-10-20 ENCOUNTER — PATIENT MESSAGE (OUTPATIENT)
Dept: FAMILY MEDICINE | Facility: CLINIC | Age: 48
End: 2023-10-20
Payer: COMMERCIAL

## 2023-10-23 ENCOUNTER — HOSPITAL ENCOUNTER (OUTPATIENT)
Dept: PREADMISSION TESTING | Facility: HOSPITAL | Age: 48
Discharge: HOME | End: 2023-10-23
Attending: COLON & RECTAL SURGERY
Payer: COMMERCIAL

## 2023-10-23 ENCOUNTER — TELEPHONE (OUTPATIENT)
Dept: PREADMISSION TESTING | Facility: HOSPITAL | Age: 48
End: 2023-10-23
Payer: COMMERCIAL

## 2023-10-23 DIAGNOSIS — Z12.11 COLON CANCER SCREENING: Primary | ICD-10-CM

## 2023-10-23 RX ORDER — SODIUM, POTASSIUM,MAG SULFATES 17.5-3.13G
1 SOLUTION, RECONSTITUTED, ORAL ORAL DAILY
Qty: 1 KIT | Refills: 0 | Status: SHIPPED | OUTPATIENT
Start: 2023-10-23 | End: 2023-10-25

## 2023-10-23 NOTE — TELEPHONE ENCOUNTER
----- Message from Apple Carranza sent at 10/23/2023  3:54 PM CDT -----  Regarding: reschedule  Contact: patient  Patient has procedure scheduled for 12/08/2023, he would like to reschedule it for 01/05/2024, please call patient back @ [phone]    Thanks

## 2023-10-23 NOTE — TELEPHONE ENCOUNTER
Spoke with pt about rescheduling procedure until after the 1st of the year. He will call back to schedule when he is ready.

## 2024-05-05 ENCOUNTER — HOSPITAL ENCOUNTER (EMERGENCY)
Facility: HOSPITAL | Age: 49
Discharge: HOME | End: 2024-05-05
Attending: EMERGENCY MEDICINE
Payer: COMMERCIAL

## 2024-05-05 ENCOUNTER — HOSPITAL ENCOUNTER (OUTPATIENT)
Facility: HOSPITAL | Age: 49
Discharge: HOME | End: 2024-05-06
Attending: FAMILY MEDICINE | Admitting: HOSPITALIST
Payer: COMMERCIAL

## 2024-05-05 VITALS
RESPIRATION RATE: 18 BRPM | HEART RATE: 62 BPM | WEIGHT: 226.63 LBS | SYSTOLIC BLOOD PRESSURE: 138 MMHG | DIASTOLIC BLOOD PRESSURE: 75 MMHG | OXYGEN SATURATION: 95 % | TEMPERATURE: 98 F | BODY MASS INDEX: 30.7 KG/M2 | HEIGHT: 72 IN

## 2024-05-05 DIAGNOSIS — R10.9 RIGHT FLANK PAIN: Primary | ICD-10-CM

## 2024-05-05 DIAGNOSIS — N20.1 URETEROLITHIASIS: Primary | ICD-10-CM

## 2024-05-05 DIAGNOSIS — R07.9 CHEST PAIN: ICD-10-CM

## 2024-05-05 DIAGNOSIS — E78.2 MIXED HYPERLIPIDEMIA: ICD-10-CM

## 2024-05-05 DIAGNOSIS — N20.0 NEPHROLITHIASIS: ICD-10-CM

## 2024-05-05 DIAGNOSIS — N20.0 KIDNEY STONE ON RIGHT SIDE: ICD-10-CM

## 2024-05-05 DIAGNOSIS — N20.0 KIDNEY STONE: ICD-10-CM

## 2024-05-05 LAB
ALBUMIN SERPL BCP-MCNC: 3.9 G/DL (ref 3.5–5.2)
ALBUMIN SERPL BCP-MCNC: 4 G/DL (ref 3.5–5.2)
ALP SERPL-CCNC: 59 U/L (ref 55–135)
ALP SERPL-CCNC: 61 U/L (ref 55–135)
ALT SERPL W/O P-5'-P-CCNC: 36 U/L (ref 10–44)
ALT SERPL W/O P-5'-P-CCNC: 41 U/L (ref 10–44)
ANION GAP SERPL CALC-SCNC: 10 MMOL/L (ref 8–16)
ANION GAP SERPL CALC-SCNC: 12 MMOL/L (ref 8–16)
AST SERPL-CCNC: 27 U/L (ref 10–40)
AST SERPL-CCNC: 29 U/L (ref 10–40)
BACTERIA #/AREA URNS HPF: ABNORMAL /HPF
BASOPHILS # BLD AUTO: 0.03 K/UL (ref 0–0.2)
BASOPHILS # BLD AUTO: 0.03 K/UL (ref 0–0.2)
BASOPHILS NFR BLD: 0.3 % (ref 0–1.9)
BASOPHILS NFR BLD: 0.3 % (ref 0–1.9)
BILIRUB SERPL-MCNC: 0.7 MG/DL (ref 0.1–1)
BILIRUB SERPL-MCNC: 2 MG/DL (ref 0.1–1)
BILIRUB UR QL STRIP: NEGATIVE
BILIRUB UR QL STRIP: NEGATIVE
BUN SERPL-MCNC: 18 MG/DL (ref 6–20)
BUN SERPL-MCNC: 20 MG/DL (ref 6–20)
CALCIUM SERPL-MCNC: 9.3 MG/DL (ref 8.7–10.5)
CALCIUM SERPL-MCNC: 9.7 MG/DL (ref 8.7–10.5)
CHLORIDE SERPL-SCNC: 108 MMOL/L (ref 95–110)
CHLORIDE SERPL-SCNC: 108 MMOL/L (ref 95–110)
CLARITY UR: CLEAR
CLARITY UR: CLEAR
CO2 SERPL-SCNC: 20 MMOL/L (ref 23–29)
CO2 SERPL-SCNC: 22 MMOL/L (ref 23–29)
COLOR UR: YELLOW
COLOR UR: YELLOW
CREAT SERPL-MCNC: 1.6 MG/DL (ref 0.5–1.4)
CREAT SERPL-MCNC: 1.9 MG/DL (ref 0.5–1.4)
DIFFERENTIAL METHOD BLD: ABNORMAL
DIFFERENTIAL METHOD BLD: ABNORMAL
EOSINOPHIL # BLD AUTO: 0 K/UL (ref 0–0.5)
EOSINOPHIL # BLD AUTO: 0.1 K/UL (ref 0–0.5)
EOSINOPHIL NFR BLD: 0.3 % (ref 0–8)
EOSINOPHIL NFR BLD: 0.6 % (ref 0–8)
ERYTHROCYTE [DISTWIDTH] IN BLOOD BY AUTOMATED COUNT: 11.8 % (ref 11.5–14.5)
ERYTHROCYTE [DISTWIDTH] IN BLOOD BY AUTOMATED COUNT: 11.9 % (ref 11.5–14.5)
EST. GFR  (NO RACE VARIABLE): 43 ML/MIN/1.73 M^2
EST. GFR  (NO RACE VARIABLE): 52 ML/MIN/1.73 M^2
GLUCOSE SERPL-MCNC: 107 MG/DL (ref 70–110)
GLUCOSE SERPL-MCNC: 121 MG/DL (ref 70–110)
GLUCOSE UR QL STRIP: NEGATIVE
GLUCOSE UR QL STRIP: NEGATIVE
HCT VFR BLD AUTO: 41.1 % (ref 40–54)
HCT VFR BLD AUTO: 43.3 % (ref 40–54)
HCV AB SERPL QL IA: NEGATIVE
HEP C VIRUS HOLD SPECIMEN: NORMAL
HGB BLD-MCNC: 13.7 G/DL (ref 14–18)
HGB BLD-MCNC: 14.6 G/DL (ref 14–18)
HGB UR QL STRIP: ABNORMAL
HGB UR QL STRIP: NEGATIVE
HIV 1+2 AB+HIV1 P24 AG SERPL QL IA: NEGATIVE
HYALINE CASTS #/AREA URNS LPF: 0 /LPF
IMM GRANULOCYTES # BLD AUTO: 0.04 K/UL (ref 0–0.04)
IMM GRANULOCYTES # BLD AUTO: 0.04 K/UL (ref 0–0.04)
IMM GRANULOCYTES NFR BLD AUTO: 0.4 % (ref 0–0.5)
IMM GRANULOCYTES NFR BLD AUTO: 0.4 % (ref 0–0.5)
KETONES UR QL STRIP: ABNORMAL
KETONES UR QL STRIP: ABNORMAL
LEUKOCYTE ESTERASE UR QL STRIP: NEGATIVE
LEUKOCYTE ESTERASE UR QL STRIP: NEGATIVE
LYMPHOCYTES # BLD AUTO: 1.1 K/UL (ref 1–4.8)
LYMPHOCYTES # BLD AUTO: 2.4 K/UL (ref 1–4.8)
LYMPHOCYTES NFR BLD: 10.3 % (ref 18–48)
LYMPHOCYTES NFR BLD: 26.5 % (ref 18–48)
MCH RBC QN AUTO: 31.5 PG (ref 27–31)
MCH RBC QN AUTO: 31.9 PG (ref 27–31)
MCHC RBC AUTO-ENTMCNC: 33.3 G/DL (ref 32–36)
MCHC RBC AUTO-ENTMCNC: 33.7 G/DL (ref 32–36)
MCV RBC AUTO: 95 FL (ref 82–98)
MCV RBC AUTO: 95 FL (ref 82–98)
MICROSCOPIC COMMENT: ABNORMAL
MONOCYTES # BLD AUTO: 0.6 K/UL (ref 0.3–1)
MONOCYTES # BLD AUTO: 0.8 K/UL (ref 0.3–1)
MONOCYTES NFR BLD: 7.2 % (ref 4–15)
MONOCYTES NFR BLD: 7.2 % (ref 4–15)
NEUTROPHILS # BLD AUTO: 5.8 K/UL (ref 1.8–7.7)
NEUTROPHILS # BLD AUTO: 8.9 K/UL (ref 1.8–7.7)
NEUTROPHILS NFR BLD: 65 % (ref 38–73)
NEUTROPHILS NFR BLD: 81.5 % (ref 38–73)
NITRITE UR QL STRIP: NEGATIVE
NITRITE UR QL STRIP: NEGATIVE
NRBC BLD-RTO: 0 /100 WBC
NRBC BLD-RTO: 0 /100 WBC
PH UR STRIP: 7 [PH] (ref 5–8)
PH UR STRIP: 7 [PH] (ref 5–8)
PLATELET # BLD AUTO: 190 K/UL (ref 150–450)
PLATELET # BLD AUTO: 234 K/UL (ref 150–450)
PLATELET BLD QL SMEAR: ABNORMAL
PMV BLD AUTO: 10.3 FL (ref 9.2–12.9)
PMV BLD AUTO: 9.8 FL (ref 9.2–12.9)
POTASSIUM SERPL-SCNC: 4.2 MMOL/L (ref 3.5–5.1)
POTASSIUM SERPL-SCNC: 4.6 MMOL/L (ref 3.5–5.1)
PROT SERPL-MCNC: 7 G/DL (ref 6–8.4)
PROT SERPL-MCNC: 7.3 G/DL (ref 6–8.4)
PROT UR QL STRIP: ABNORMAL
PROT UR QL STRIP: ABNORMAL
RBC # BLD AUTO: 4.35 M/UL (ref 4.6–6.2)
RBC # BLD AUTO: 4.57 M/UL (ref 4.6–6.2)
RBC #/AREA URNS HPF: >100 /HPF (ref 0–4)
SODIUM SERPL-SCNC: 138 MMOL/L (ref 136–145)
SODIUM SERPL-SCNC: 142 MMOL/L (ref 136–145)
SP GR UR STRIP: 1.03 (ref 1–1.03)
SP GR UR STRIP: >1.03 (ref 1–1.03)
SQUAMOUS #/AREA URNS HPF: 0 /HPF
URN SPEC COLLECT METH UR: ABNORMAL
URN SPEC COLLECT METH UR: ABNORMAL
UROBILINOGEN UR STRIP-ACNC: NEGATIVE EU/DL
UROBILINOGEN UR STRIP-ACNC: NEGATIVE EU/DL
WBC # BLD AUTO: 10.85 K/UL (ref 3.9–12.7)
WBC # BLD AUTO: 8.91 K/UL (ref 3.9–12.7)
WBC #/AREA URNS HPF: 0 /HPF (ref 0–5)

## 2024-05-05 PROCEDURE — 96375 TX/PRO/DX INJ NEW DRUG ADDON: CPT

## 2024-05-05 PROCEDURE — 96374 THER/PROPH/DIAG INJ IV PUSH: CPT

## 2024-05-05 PROCEDURE — G0378 HOSPITAL OBSERVATION PER HR: HCPCS

## 2024-05-05 PROCEDURE — 63600175 PHARM REV CODE 636 W HCPCS: Performed by: NURSE PRACTITIONER

## 2024-05-05 PROCEDURE — 85025 COMPLETE CBC W/AUTO DIFF WBC: CPT | Performed by: EMERGENCY MEDICINE

## 2024-05-05 PROCEDURE — 25000003 PHARM REV CODE 250: Performed by: FAMILY MEDICINE

## 2024-05-05 PROCEDURE — 63600175 PHARM REV CODE 636 W HCPCS: Performed by: FAMILY MEDICINE

## 2024-05-05 PROCEDURE — 51798 US URINE CAPACITY MEASURE: CPT

## 2024-05-05 PROCEDURE — 96361 HYDRATE IV INFUSION ADD-ON: CPT

## 2024-05-05 PROCEDURE — 63600175 PHARM REV CODE 636 W HCPCS: Performed by: EMERGENCY MEDICINE

## 2024-05-05 PROCEDURE — 81000 URINALYSIS NONAUTO W/SCOPE: CPT | Performed by: EMERGENCY MEDICINE

## 2024-05-05 PROCEDURE — 86803 HEPATITIS C AB TEST: CPT | Performed by: EMERGENCY MEDICINE

## 2024-05-05 PROCEDURE — 80053 COMPREHEN METABOLIC PANEL: CPT | Mod: 91 | Performed by: FAMILY MEDICINE

## 2024-05-05 PROCEDURE — 81003 URINALYSIS AUTO W/O SCOPE: CPT | Performed by: FAMILY MEDICINE

## 2024-05-05 PROCEDURE — 85025 COMPLETE CBC W/AUTO DIFF WBC: CPT | Mod: 91 | Performed by: FAMILY MEDICINE

## 2024-05-05 PROCEDURE — 87389 HIV-1 AG W/HIV-1&-2 AB AG IA: CPT | Performed by: EMERGENCY MEDICINE

## 2024-05-05 PROCEDURE — 25000003 PHARM REV CODE 250: Performed by: EMERGENCY MEDICINE

## 2024-05-05 PROCEDURE — 99285 EMERGENCY DEPT VISIT HI MDM: CPT | Mod: 25

## 2024-05-05 PROCEDURE — 80053 COMPREHEN METABOLIC PANEL: CPT | Performed by: EMERGENCY MEDICINE

## 2024-05-05 PROCEDURE — 99285 EMERGENCY DEPT VISIT HI MDM: CPT | Mod: 25,27

## 2024-05-05 RX ORDER — SODIUM CHLORIDE, SODIUM LACTATE, POTASSIUM CHLORIDE, CALCIUM CHLORIDE 600; 310; 30; 20 MG/100ML; MG/100ML; MG/100ML; MG/100ML
INJECTION, SOLUTION INTRAVENOUS CONTINUOUS
Status: DISCONTINUED | OUTPATIENT
Start: 2024-05-05 | End: 2024-05-06 | Stop reason: HOSPADM

## 2024-05-05 RX ORDER — NALOXONE HCL 0.4 MG/ML
0.02 VIAL (ML) INJECTION
Status: DISCONTINUED | OUTPATIENT
Start: 2024-05-05 | End: 2024-05-06 | Stop reason: HOSPADM

## 2024-05-05 RX ORDER — ONDANSETRON HYDROCHLORIDE 2 MG/ML
4 INJECTION, SOLUTION INTRAVENOUS ONCE
Status: COMPLETED | OUTPATIENT
Start: 2024-05-05 | End: 2024-05-05

## 2024-05-05 RX ORDER — IBUPROFEN 200 MG
16 TABLET ORAL
Status: DISCONTINUED | OUTPATIENT
Start: 2024-05-05 | End: 2024-05-06 | Stop reason: HOSPADM

## 2024-05-05 RX ORDER — KETOROLAC TROMETHAMINE 30 MG/ML
15 INJECTION, SOLUTION INTRAMUSCULAR; INTRAVENOUS
Status: COMPLETED | OUTPATIENT
Start: 2024-05-05 | End: 2024-05-05

## 2024-05-05 RX ORDER — ACETAMINOPHEN 650 MG/1
650 SUPPOSITORY RECTAL EVERY 4 HOURS PRN
Status: DISCONTINUED | OUTPATIENT
Start: 2024-05-05 | End: 2024-05-06 | Stop reason: HOSPADM

## 2024-05-05 RX ORDER — ONDANSETRON 4 MG/1
4 TABLET, FILM COATED ORAL EVERY 8 HOURS PRN
Qty: 12 TABLET | Refills: 0 | Status: SHIPPED | OUTPATIENT
Start: 2024-05-05

## 2024-05-05 RX ORDER — KETOROLAC TROMETHAMINE 30 MG/ML
30 INJECTION, SOLUTION INTRAMUSCULAR; INTRAVENOUS EVERY 6 HOURS
Status: DISCONTINUED | OUTPATIENT
Start: 2024-05-06 | End: 2024-05-05 | Stop reason: DRUGHIGH

## 2024-05-05 RX ORDER — POLYETHYLENE GLYCOL 3350 17 G/17G
17 POWDER, FOR SOLUTION ORAL DAILY PRN
Status: DISCONTINUED | OUTPATIENT
Start: 2024-05-05 | End: 2024-05-06 | Stop reason: HOSPADM

## 2024-05-05 RX ORDER — MORPHINE SULFATE 4 MG/ML
6 INJECTION, SOLUTION INTRAMUSCULAR; INTRAVENOUS
Status: COMPLETED | OUTPATIENT
Start: 2024-05-05 | End: 2024-05-05

## 2024-05-05 RX ORDER — GLUCAGON 1 MG
1 KIT INJECTION
Status: DISCONTINUED | OUTPATIENT
Start: 2024-05-05 | End: 2024-05-06 | Stop reason: HOSPADM

## 2024-05-05 RX ORDER — ACETAMINOPHEN 325 MG/1
650 TABLET ORAL EVERY 8 HOURS PRN
Status: DISCONTINUED | OUTPATIENT
Start: 2024-05-05 | End: 2024-05-06 | Stop reason: HOSPADM

## 2024-05-05 RX ORDER — MORPHINE SULFATE 4 MG/ML
2 INJECTION, SOLUTION INTRAMUSCULAR; INTRAVENOUS EVERY 4 HOURS PRN
Status: DISCONTINUED | OUTPATIENT
Start: 2024-05-05 | End: 2024-05-06 | Stop reason: HOSPADM

## 2024-05-05 RX ORDER — ALUMINUM HYDROXIDE, MAGNESIUM HYDROXIDE, AND SIMETHICONE 1200; 120; 1200 MG/30ML; MG/30ML; MG/30ML
30 SUSPENSION ORAL 4 TIMES DAILY PRN
Status: DISCONTINUED | OUTPATIENT
Start: 2024-05-05 | End: 2024-05-06 | Stop reason: HOSPADM

## 2024-05-05 RX ORDER — KETOROLAC TROMETHAMINE 30 MG/ML
15 INJECTION, SOLUTION INTRAMUSCULAR; INTRAVENOUS EVERY 6 HOURS
Status: DISCONTINUED | OUTPATIENT
Start: 2024-05-06 | End: 2024-05-06

## 2024-05-05 RX ORDER — SIMETHICONE 80 MG
1 TABLET,CHEWABLE ORAL 4 TIMES DAILY PRN
Status: DISCONTINUED | OUTPATIENT
Start: 2024-05-05 | End: 2024-05-06 | Stop reason: HOSPADM

## 2024-05-05 RX ORDER — ASPIRIN 81 MG/1
1 TABLET ORAL EVERY MORNING
Status: ON HOLD | COMMUNITY
Start: 2024-04-04 | End: 2024-05-06 | Stop reason: HOSPADM

## 2024-05-05 RX ORDER — SODIUM CHLORIDE 0.9 % (FLUSH) 0.9 %
3 SYRINGE (ML) INJECTION EVERY 12 HOURS PRN
Status: DISCONTINUED | OUTPATIENT
Start: 2024-05-05 | End: 2024-05-06 | Stop reason: HOSPADM

## 2024-05-05 RX ORDER — ONDANSETRON HYDROCHLORIDE 2 MG/ML
8 INJECTION, SOLUTION INTRAVENOUS
Status: COMPLETED | OUTPATIENT
Start: 2024-05-05 | End: 2024-05-05

## 2024-05-05 RX ORDER — HYDROCODONE BITARTRATE AND ACETAMINOPHEN 7.5; 325 MG/1; MG/1
1 TABLET ORAL EVERY 6 HOURS PRN
Qty: 15 TABLET | Refills: 0 | Status: ON HOLD | OUTPATIENT
Start: 2024-05-05 | End: 2024-05-06 | Stop reason: HOSPADM

## 2024-05-05 RX ORDER — TAMSULOSIN HYDROCHLORIDE 0.4 MG/1
0.4 CAPSULE ORAL
Status: COMPLETED | OUTPATIENT
Start: 2024-05-05 | End: 2024-05-05

## 2024-05-05 RX ORDER — PROMETHAZINE HYDROCHLORIDE 25 MG/1
25 TABLET ORAL EVERY 6 HOURS PRN
Status: DISCONTINUED | OUTPATIENT
Start: 2024-05-05 | End: 2024-05-06 | Stop reason: HOSPADM

## 2024-05-05 RX ORDER — PRAVASTATIN SODIUM 40 MG/1
40 TABLET ORAL NIGHTLY
COMMUNITY

## 2024-05-05 RX ORDER — IBUPROFEN 200 MG
24 TABLET ORAL
Status: DISCONTINUED | OUTPATIENT
Start: 2024-05-05 | End: 2024-05-06 | Stop reason: HOSPADM

## 2024-05-05 RX ORDER — HYDROCODONE BITARTRATE AND ACETAMINOPHEN 5; 325 MG/1; MG/1
1 TABLET ORAL EVERY 6 HOURS PRN
Status: DISCONTINUED | OUTPATIENT
Start: 2024-05-05 | End: 2024-05-06

## 2024-05-05 RX ORDER — MORPHINE SULFATE 4 MG/ML
4 INJECTION, SOLUTION INTRAMUSCULAR; INTRAVENOUS
Status: COMPLETED | OUTPATIENT
Start: 2024-05-05 | End: 2024-05-05

## 2024-05-05 RX ORDER — ONDANSETRON HYDROCHLORIDE 2 MG/ML
4 INJECTION, SOLUTION INTRAVENOUS EVERY 8 HOURS PRN
Status: DISCONTINUED | OUTPATIENT
Start: 2024-05-05 | End: 2024-05-06 | Stop reason: HOSPADM

## 2024-05-05 RX ORDER — KETOROLAC TROMETHAMINE 30 MG/ML
30 INJECTION, SOLUTION INTRAMUSCULAR; INTRAVENOUS
Status: COMPLETED | OUTPATIENT
Start: 2024-05-05 | End: 2024-05-05

## 2024-05-05 RX ORDER — TALC
6 POWDER (GRAM) TOPICAL NIGHTLY PRN
Status: DISCONTINUED | OUTPATIENT
Start: 2024-05-05 | End: 2024-05-06 | Stop reason: HOSPADM

## 2024-05-05 RX ADMIN — ONDANSETRON 8 MG: 2 INJECTION INTRAMUSCULAR; INTRAVENOUS at 12:05

## 2024-05-05 RX ADMIN — ONDANSETRON 4 MG: 2 INJECTION INTRAMUSCULAR; INTRAVENOUS at 07:05

## 2024-05-05 RX ADMIN — TAMSULOSIN HYDROCHLORIDE 0.4 MG: 0.4 CAPSULE ORAL at 07:05

## 2024-05-05 RX ADMIN — SODIUM CHLORIDE 1000 ML: 9 INJECTION, SOLUTION INTRAVENOUS at 02:05

## 2024-05-05 RX ADMIN — KETOROLAC TROMETHAMINE 15 MG: 30 INJECTION, SOLUTION INTRAMUSCULAR at 12:05

## 2024-05-05 RX ADMIN — MORPHINE SULFATE 4 MG: 4 INJECTION INTRAVENOUS at 05:05

## 2024-05-05 RX ADMIN — KETOROLAC TROMETHAMINE 30 MG: 30 INJECTION, SOLUTION INTRAMUSCULAR at 05:05

## 2024-05-05 RX ADMIN — SODIUM CHLORIDE 1000 ML: 9 INJECTION, SOLUTION INTRAVENOUS at 07:05

## 2024-05-05 RX ADMIN — SODIUM CHLORIDE, POTASSIUM CHLORIDE, SODIUM LACTATE AND CALCIUM CHLORIDE: 600; 310; 30; 20 INJECTION, SOLUTION INTRAVENOUS at 08:05

## 2024-05-05 RX ADMIN — SODIUM CHLORIDE 500 ML: 9 INJECTION, SOLUTION INTRAVENOUS at 05:05

## 2024-05-05 RX ADMIN — MORPHINE SULFATE 6 MG: 4 INJECTION INTRAVENOUS at 02:05

## 2024-05-05 NOTE — ED PROVIDER NOTES
"SCRIBE #1 NOTE: I, Me-Lewis Adkinsfo, am scribing for, and in the presence of, Rivka Bravo MD. I have scribed the entire note.       History     Chief Complaint   Patient presents with    Flank Pain     Pt c/o right-sided flank pain.  He was seen here last night for the same complaint and was told he has a kidney stone.  Pt states pain has worsened.     Review of patient's allergies indicates:  No Known Allergies      History of Present Illness     HPI    5/5/2024, 5:41 PM  History obtained from the patient      History of Present Illness: Manjinder Witt is a 48 y.o. male patient with a PMHx of anxiety and HLD who presents to the Emergency Department for evaluation of R-sided flank pain which onset last night. Pt was at this ED facility last night for the same complaint and was found to have kidney stones. Pt was discharged with pain medications and was given a strainer. Pt is unrelieved with pain medications and has only been to the bathroom once since last night. At this time, pt states that his pain has worsened in severity. R flank pain is constant. No mitigating or exacerbating factors reported. Associated sxs include nausea and vomiting. Nausea comes and goes. Patient denies any fever, SOB, weakness, and all other sxs at this time. No further complaints or concerns at this time.       Arrival mode: Personal vehicle    PCP: Opal Lara MD        Past Medical History:  Past Medical History:   Diagnosis Date    Anxiety     General anesthetics causing adverse effect in therapeutic use     recent sx achilles tendon, given "something to dry up my sinuses and I couldn't hardly breath"    Hyperlipidemia        Past Surgical History:  Past Surgical History:   Procedure Laterality Date    ACHILLES TENDON SURGERY Right     APPENDECTOMY N/A 5/24/2018    Procedure: APPENDECTOMY;  Surgeon: Jericho Kessler MD;  Location: [facility];  Service: General;  Laterality: N/A;    CYSTOSCOPY W/ URETERAL STENT PLACEMENT Right " 5/6/2024    Procedure: CYSTOSCOPY, WITH URETERAL STENT INSERTION;  Surgeon: Duke Garcia MD;  Location: [facility] OR;  Service: Urology;  Laterality: Right;    ESOPHAGOGASTRODUODENOSCOPY N/A 6/29/2021    Procedure: ESOPHAGOGASTRODUODENOSCOPY (EGD);  Surgeon: Beatriz Hines MD;  Location: [facility] ENDO;  Service: Endoscopy;  Laterality: N/A;    EXTRACTION - STONE Right 5/6/2024    Procedure: EXTRACTION - STONE;  Surgeon: Duke Garcia MD;  Location: [facility] OR;  Service: Urology;  Laterality: Right;    FRACTURE SURGERY      left leg pins an screws     NECK MASS EXCISION Left 12/7/2018    Procedure: EXCISION, MASS, NECK;  Surgeon: Alcira Dale MD;  Location: [facility] OR;  Service: ENT;  Laterality: Left;    URETEROSCOPY Right 5/6/2024    Procedure: URETEROSCOPY;  Surgeon: Duke Garcia MD;  Location: [facility] OR;  Service: Urology;  Laterality: Right;         Family History:  Family History   Problem Relation Name Age of Onset    Heart disease Father         Social History:  Social History     Tobacco Use    Smoking status: Former     Current packs/day: 0.00     Average packs/day: 1 pack/day for 17.0 years (17.0 ttl pk-yrs)     Types: Vaping with nicotine, Cigarettes     Start date: 6/29/2003     Quit date: 6/29/2020     Years since quitting: 3.8    Smokeless tobacco: Never    Tobacco comments:     no smoking after m.n prior to surgery   Substance and Sexual Activity    Alcohol use: Yes     Alcohol/week: 1.7 standard drinks of alcohol     Types: 2 Standard drinks or equivalent per week     Comment: social  No alcohol 72h prior to sx    Drug use: No    Sexual activity: Yes     Partners: Female     Birth control/protection: None        Review of Systems     Review of Systems   Constitutional:  Negative for fever.   HENT:  Negative for sore throat.    Respiratory:  Negative for shortness of breath.    Cardiovascular:  Negative for chest pain.   Gastrointestinal:  Positive for nausea and vomiting.    Genitourinary:  Positive for flank pain (R).   Musculoskeletal:  Negative for back pain.   Skin:  Negative for rash.   Neurological:  Negative for weakness.   All other systems reviewed and are negative.       Physical Exam     Initial Vitals [05/05/24 1650]   BP Pulse Resp Temp SpO2   125/66 (!) 48 18 97.7 °F (36.5 °C) 99 %      MAP       --          Physical Exam  Nursing Notes and Vital Signs Reviewed.  Constitutional: Patient is in mild distress. Well-developed and well-nourished.  Head: Atraumatic. Normocephalic.  Eyes: PERRL. EOM intact. Conjunctivae are not pale. No scleral icterus.  ENT: Mucous membranes are moist. Oropharynx is clear and symmetric.    Neck: Supple. Full ROM. No lymphadenopathy.  Cardiovascular: Regular rate. Regular rhythm. No murmurs, rubs, or gallops. Distal pulses are 2+ and symmetric.  Pulmonary/Chest: No respiratory distress. Clear to auscultation bilaterally. No wheezing or rales.  Abdominal: Soft and non-distended.  There is no tenderness.  No rebound, guarding, or rigidity. Good bowel sounds.  Genitourinary: No CVA tenderness. R flank tenderness.  Musculoskeletal: Moves all extremities. No obvious deformities. No edema. No calf tenderness.  Skin: Warm and dry.  Neurological:  Alert, awake, and appropriate.  Normal speech.  No acute focal neurological deficits are appreciated.  Psychiatric: Normal affect. Good eye contact. Appropriate in content.     ED Course   Critical Care    Date/Time: 5/5/2024 7:33 PM    Performed by: Rivka Bravo MD  Authorized by: Rivka Bravo MD  Direct patient critical care time: 29 minutes  Additional history critical care time: 9 minutes  Ordering / reviewing critical care time: 7 minutes  Documentation critical care time: 7 minutes  Consulting other physicians critical care time: 5 minutes  Consult with family critical care time: 3 minutes  Total critical care time (exclusive of procedural time) : 60 minutes  Critical care time was  exclusive of separately billable procedures and treating other patients and teaching time.  Critical care was necessary to treat or prevent imminent or life-threatening deterioration of the following conditions: nephrolithiasis.  Critical care was time spent personally by me on the following activities: development of treatment plan with patient or surrogate, blood draw for specimens, discussions with consultants, interpretation of cardiac output measurements, evaluation of patient's response to treatment, review of old charts, re-evaluation of patient's condition, pulse oximetry, ordering and review of radiographic studies, ordering and review of laboratory studies, ordering and performing treatments and interventions, obtaining history from patient or surrogate and examination of patient.        ED Vital Signs:  Vitals:    05/05/24 1836 05/05/24 1851 05/05/24 1900 05/05/24 1933   BP:   (!) 104/57 (!) 109/59   Pulse:   (!) 47 (!) 44   Resp: 19  (!) 24 18   Temp:       TempSrc:       SpO2:  95% 99% 95%   Weight:       Height:        05/05/24 2010 05/05/24 2100 05/06/24 0003 05/06/24 0425   BP: 112/63  (!) 102/52    Pulse: (!) 45  (!) 48 (!) 56   Resp: 18  18 18   Temp: 98.2 °F (36.8 °C)  98.2 °F (36.8 °C) 97.8 °F (36.6 °C)   TempSrc:   Oral Oral   SpO2: 97%  (!) 93% (!) 93%   Weight:  104.3 kg (230 lb)     Height:  6' (1.829 m)      05/06/24 0734 05/06/24 0831 05/06/24 1210 05/06/24 1215   BP:  117/76 125/67 (!) 103/57   Pulse: 72 (!) 51 63 60   Resp: 18 17 11 16   Temp:  98.8 °F (37.1 °C) 97.8 °F (36.6 °C)    TempSrc:  Oral Temporal    SpO2: (!) 93% 95% (!) 93% 96%   Weight:       Height:        05/06/24 1230 05/06/24 1245 05/06/24 1652   BP: (!) 101/58 (!) 101/59 121/65   Pulse: (!) 56 (!) 53 60   Resp: 14 14 18   Temp:   97.9 °F (36.6 °C)   TempSrc:   Oral   SpO2: 97% (!) 92% (!) 93%   Weight:      Height:          Abnormal Lab Results:  Labs Reviewed   CBC W/ AUTO DIFFERENTIAL - Abnormal; Notable for the  following components:       Result Value    RBC 4.35 (*)     Hemoglobin 13.7 (*)     MCH 31.5 (*)     Gran # (ANC) 8.9 (*)     Gran % 81.5 (*)     Lymph % 10.3 (*)     All other components within normal limits    Narrative:     Release to patient->Immediate   COMPREHENSIVE METABOLIC PANEL - Abnormal; Notable for the following components:    CO2 20 (*)     Creatinine 1.9 (*)     Total Bilirubin 2.0 (*)     eGFR 43 (*)     All other components within normal limits    Narrative:     Release to patient->Immediate   HIV 1 / 2 ANTIBODY    Narrative:     Release to patient->Immediate   HEPATITIS C ANTIBODY    Narrative:     Release to patient->Immediate   HEP C VIRUS HOLD SPECIMEN    Narrative:     Release to patient->Immediate        All Lab Results:  Results for orders placed or performed during the hospital encounter of 05/05/24   HIV 1/2 Ag/Ab (4th Gen)   Result Value Ref Range    HIV 1/2 Ag/Ab Negative Negative   Hepatitis C Antibody   Result Value Ref Range    Hepatitis C Ab Negative Negative   HCV Virus Hold Specimen   Result Value Ref Range    HEP C Virus Hold Specimen Hold for HCV sendout    CBC auto differential   Result Value Ref Range    WBC 10.85 3.90 - 12.70 K/uL    RBC 4.35 (L) 4.60 - 6.20 M/uL    Hemoglobin 13.7 (L) 14.0 - 18.0 g/dL    Hematocrit 41.1 40.0 - 54.0 %    MCV 95 82 - 98 fL    MCH 31.5 (H) 27.0 - 31.0 pg    MCHC 33.3 32.0 - 36.0 g/dL    RDW 11.9 11.5 - 14.5 %    Platelets 190 150 - 450 K/uL    MPV 10.3 9.2 - 12.9 fL    Immature Granulocytes 0.4 0.0 - 0.5 %    Gran # (ANC) 8.9 (H) 1.8 - 7.7 K/uL    Immature Grans (Abs) 0.04 0.00 - 0.04 K/uL    Lymph # 1.1 1.0 - 4.8 K/uL    Mono # 0.8 0.3 - 1.0 K/uL    Eos # 0.0 0.0 - 0.5 K/uL    Baso # 0.03 0.00 - 0.20 K/uL    nRBC 0 0 /100 WBC    Gran % 81.5 (H) 38.0 - 73.0 %    Lymph % 10.3 (L) 18.0 - 48.0 %    Mono % 7.2 4.0 - 15.0 %    Eosinophil % 0.3 0.0 - 8.0 %    Basophil % 0.3 0.0 - 1.9 %    Platelet Estimate Appears normal     Differential Method  Automated    Comprehensive metabolic panel   Result Value Ref Range    Sodium 138 136 - 145 mmol/L    Potassium 4.6 3.5 - 5.1 mmol/L    Chloride 108 95 - 110 mmol/L    CO2 20 (L) 23 - 29 mmol/L    Glucose 107 70 - 110 mg/dL    BUN 20 6 - 20 mg/dL    Creatinine 1.9 (H) 0.5 - 1.4 mg/dL    Calcium 9.3 8.7 - 10.5 mg/dL    Total Protein 7.0 6.0 - 8.4 g/dL    Albumin 3.9 3.5 - 5.2 g/dL    Total Bilirubin 2.0 (H) 0.1 - 1.0 mg/dL    Alkaline Phosphatase 61 55 - 135 U/L    AST 29 10 - 40 U/L    ALT 36 10 - 44 U/L    eGFR 43 (A) >60 mL/min/1.73 m^2    Anion Gap 10 8 - 16 mmol/L   Urinalysis - Clean Catch   Result Value Ref Range    Specimen UA Urine, Clean Catch     Color, UA Yellow Yellow, Straw, Jackeline    Appearance, UA Clear Clear    pH, UA 7.0 5.0 - 8.0    Specific Gravity, UA >1.030 (A) 1.005 - 1.030    Protein, UA Trace (A) Negative    Glucose, UA Negative Negative    Ketones, UA 1+ (A) Negative    Bilirubin (UA) Negative Negative    Occult Blood UA Negative Negative    Nitrite, UA Negative Negative    Urobilinogen, UA Negative <2.0 EU/dL    Leukocytes, UA Negative Negative   Urinary Stone Analysis   Result Value Ref Range    Stone Source Stone    Specimen to Pathology, Surgery Urology   Result Value Ref Range    Final Pathologic Diagnosis       Kidney stone, extraction:   For gross examination and chemical analysis, see gross description.      Gross       Surgery ID:  [id number]    Pathology ID:  [id number]  1. Received fresh labeled &quot;right kidney stone&quot; is a single tan-brown calculus measuring 0.4 x 0.4 x 0.3 cm.  The specimen is submitted for gross examination and chemical analysis.      Grossed by: Ronny Lawrence MS, PA(ASCP)      Disclaimer       Unless the case is a 'gross only' or additional testing only, the final diagnosis for each specimen is based on a microscopic examination of appropriate tissue sections.         Imaging Results:  Imaging Results    None                The Emergency Provider reviewed  the vital signs and test results, which are outlined above.     ED Discussion     7:21 PM: Discussed pt's case with Dr. Adrien Carson MD (Urology) does not recommend to get another CT scan if pt has not passed the kidney stone and is still hurting.    7:25 PM: Dr. Carson states that pt can benefit from admission with fluids and flomax.    7:37 PM: Discussed case with Nicanor Jarrell NP ([facility]). Dr. Jarrell agrees with current care and management of pt and accepts admission.   Admitting Service: Hospital Medicine  Admitting Physician: Dr. Jarrell  Admit to: Obs Med Surg    7:38 PM: Re-evaluated pt. I have discussed test results, shared treatment plan, and the need for admission with patient and family at bedside. Pt and family express understanding at this time and agree with all information. All questions answered. Pt and family have no further questions or concerns at this time. Pt is ready for admit.         Medical Decision Making  Amount and/or Complexity of Data Reviewed  Labs: ordered. Decision-making details documented in ED Course.    Risk  Prescription drug management.  Decision regarding hospitalization.                ED Medication(s):  Medications   ketorolac injection 30 mg (30 mg Intravenous Given 5/5/24 1737)   morphine injection 4 mg (4 mg Intravenous Given 5/5/24 1747)   sodium chloride 0.9% bolus 500 mL 500 mL (0 mLs Intravenous Stopped 5/5/24 1855)   ondansetron injection 4 mg (4 mg Intravenous Given 5/5/24 1942)   tamsulosin 24 hr capsule 0.4 mg (0.4 mg Oral Given 5/5/24 1943)   sodium chloride 0.9% bolus 1,000 mL 1,000 mL (0 mLs Intravenous Stopped 5/5/24 2042)       Discharge Medication List as of 5/6/2024  5:08 PM        START taking these medications    Details   nitrofurantoin, macrocrystal-monohydrate, (MACROBID) 100 MG capsule Take 1 capsule (100 mg total) by mouth 2 (two) times daily. for 3 days, Starting Mon 5/6/2024, Until Thu 5/9/2024, Normal      oxyCODONE  (ROXICODONE) 5 MG immediate release tablet Take 1 tablet (5 mg total) by mouth every 6 (six) hours as needed for Pain., Starting Mon 5/6/2024, Normal      phenazopyridine (PYRIDIUM) 100 MG tablet Take 1 tablet (100 mg total) by mouth 3 (three) times daily as needed (Dysuria)., Starting Mon 5/6/2024, Until Thu 5/16/2024 at 2359, Normal      solifenacin (VESICARE) 5 MG tablet Take 1 tablet (5 mg total) by mouth once daily. for 5 days, Starting Mon 5/6/2024, Until Sat 5/11/2024, Normal      tamsulosin (FLOMAX) 0.4 mg Cap Take 1 capsule (0.4 mg total) by mouth once daily. for 5 days, Starting Mon 5/6/2024, Until Sat 5/11/2024, Normal              Follow-up Information       Opal Lara MD. Schedule an appointment as soon as possible for a visit.    Specialty: Family Medicine  Why: Follow up in 3-5 days  Contact information:  [address] [address] [address] [phone]               Duke Garcia MD. Schedule an appointment as soon as possible for a visit.    Specialty: Urology  Why: Call clinic for appointment time on wednesday 5/8/2024 for stent removal  Contact information:  [address] [facility] [place] [facility]  [facility] [address] [phone]                                 Scribe Attestation:   Scribe #1: I performed the above scribed service and the documentation accurately describes the services I performed. I attest to the accuracy of the note.     Attending:   Physician Attestation Statement for Scribe #1: I, Rivka Bravo MD, personally performed the services described in this documentation, as scribed by Kathy Jin, in my presence, and it is both accurate and complete.           Clinical Impression       ICD-10-CM ICD-9-CM   1. Right flank pain  R10.9 789.09   2. Nephrolithiasis  N20.0 592.0   3. Kidney stone  N20.0 592.0   4. Chest pain  R07.9 786.50   5. Mixed hyperlipidemia  E78.2 272.2   6. Kidney stone on right side  N20.0 592.0       Disposition:    Disposition: Placed in Observation  Condition: Rivka Rider MD  05/07/24 7074

## 2024-05-05 NOTE — ED PROVIDER NOTES
"SCRIBE #1 NOTE: I, Lacie Kaur Дмитрий, am scribing for, and in the presence of, Yenifer Moncada MD. I have scribed the entire note.       History     Chief Complaint   Patient presents with    Flank Pain     Right-sided flank pain onset 40 mins PTA     Review of patient's allergies indicates:  No Known Allergies      History of Present Illness     HPI    5/5/2024, 12:55 AM  History obtained from the patient      History of Present Illness: Manjinder Witt is a 48 y.o. male patient with a PMHx of HLD who presents to the Emergency Department for evaluation of R-sided flank pain which onset 1 hr ago. Pt rates his pain as a 10/10. He does not have a history of kidney stones. Pt vomited upon exam and stated "my kidneys are hurting." Symptoms are constant and moderate in severity. No mitigating or exacerbating factors reported. Associated sxs include vomiting and decreased urination. Patient denies any dizziness and all other sxs at this time. No prior Tx reported. No further complaints or concerns at this time.       Arrival mode: Personal vehicle     PCP: Opal Lara MD        Past Medical History:  Past Medical History:   Diagnosis Date    Anxiety     General anesthetics causing adverse effect in therapeutic use     recent sx achilles tendon, given "something to dry up my sinuses and I couldn't hardly breath"    Hyperlipidemia        Past Surgical History:  Past Surgical History:   Procedure Laterality Date    ACHILLES TENDON SURGERY Right     APPENDECTOMY N/A 5/24/2018    Procedure: APPENDECTOMY;  Surgeon: Jericho Kessler MD;  Location: [facility] OR;  Service: General;  Laterality: N/A;    ESOPHAGOGASTRODUODENOSCOPY N/A 6/29/2021    Procedure: ESOPHAGOGASTRODUODENOSCOPY (EGD);  Surgeon: Beatriz Hines MD;  Location: [facility];  Service: Endoscopy;  Laterality: N/A;    FRACTURE SURGERY      left leg pins an screws     NECK MASS EXCISION Left 12/7/2018    Procedure: EXCISION, MASS, NECK;  Surgeon: Alcira Dale MD;  " Location: [facility] OR;  Service: ENT;  Laterality: Left;         Family History:  Family History   Problem Relation Name Age of Onset    Heart disease Father         Social History:  Social History     Tobacco Use    Smoking status: Former     Current packs/day: 0.00     Average packs/day: 1 pack/day for 17.0 years (17.0 ttl pk-yrs)     Types: Vaping with nicotine, Cigarettes     Start date: 6/29/2003     Quit date: 6/29/2020     Years since quitting: 3.8    Smokeless tobacco: Never    Tobacco comments:     no smoking after m.n prior to surgery   Substance and Sexual Activity    Alcohol use: Yes     Alcohol/week: 1.7 standard drinks of alcohol     Types: 2 Standard drinks or equivalent per week     Comment: social  No alcohol 72h prior to sx    Drug use: No    Sexual activity: Yes     Partners: Female     Birth control/protection: None        Review of Systems     Review of Systems   Constitutional:  Negative for fever.   HENT:  Negative for sore throat.    Respiratory:  Negative for shortness of breath.    Cardiovascular:  Negative for chest pain.   Gastrointestinal:  Positive for vomiting. Negative for nausea.   Genitourinary:  Positive for decreased urine volume and flank pain (R-sided). Negative for dysuria.   Musculoskeletal:  Negative for back pain.   Skin:  Negative for rash.   Neurological:  Negative for dizziness and weakness.   Hematological:  Does not bruise/bleed easily.      Physical Exam     Initial Vitals [05/05/24 0037]   BP Pulse Resp Temp SpO2   (!) 148/78 (!) 52 20 98 °F (36.7 °C) (!) 94 %      MAP       --          Physical Exam  Nursing Notes and Vital Signs Reviewed.  Constitutional: Patient is in moderate distress. Well-developed and well-nourished.  Head: Atraumatic. Normocephalic.  Eyes: PERRL. EOM intact. Conjunctivae are not pale. No scleral icterus.  ENT: Mucous membranes are moist. Oropharynx is clear and symmetric.    Neck: Supple. Full ROM. No lymphadenopathy.  Cardiovascular: Regular  rate. Regular rhythm. No murmurs, rubs, or gallops. Distal pulses are 2+ and symmetric.  Pulmonary/Chest: No respiratory distress. Clear to auscultation bilaterally. No wheezing or rales.  Abdominal: Soft and non-distended.  There is no tenderness.  No rebound, guarding, or rigidity. Good bowel sounds.  Genitourinary: R-CVA tenderness  Musculoskeletal: Moves all extremities. No obvious deformities. No edema. No calf tenderness.  Skin: Warm and dry.  Neurological:  Alert, awake, and appropriate.  Normal speech.  No acute focal neurological deficits are appreciated.  Psychiatric: Normal affect. Good eye contact. Appropriate in content.     ED Course   Procedures  ED Vital Signs:  Vitals:    05/05/24 0037 05/05/24 0108 05/05/24 0130 05/05/24 0217   BP: (!) 148/78 (!) 150/75 131/74 125/79   Pulse: (!) 52 (!) 50 (!) 47 (!) 46   Resp: 20  18    Temp: 98 °F (36.7 °C)      TempSrc: Oral      SpO2: (!) 94% 98% 97% 95%   Weight:       Height:        05/05/24 0249 05/05/24 0304 05/05/24 0306 05/05/24 0315   BP: (!) 170/123 (!) 149/70     Pulse: 62  (!) 55    Resp: 20      Temp:       TempSrc:       SpO2: 99%  97%    Weight:    102.8 kg (226 lb 10.1 oz)   Height:    6' (1.829 m)    05/05/24 0324   BP: 138/75   Pulse: 62   Resp: 18   Temp: 98.2 °F (36.8 °C)   TempSrc: Oral   SpO2: 95%   Weight:    Height:        Abnormal Lab Results:  Labs Reviewed   CBC W/ AUTO DIFFERENTIAL - Abnormal; Notable for the following components:       Result Value    RBC 4.57 (*)     MCH 31.9 (*)     All other components within normal limits   COMPREHENSIVE METABOLIC PANEL - Abnormal; Notable for the following components:    CO2 22 (*)     Glucose 121 (*)     Creatinine 1.6 (*)     eGFR 52 (*)     All other components within normal limits   URINALYSIS, REFLEX TO URINE CULTURE - Abnormal; Notable for the following components:    Protein, UA 1+ (*)     Ketones, UA Trace (*)     Occult Blood UA 3+ (*)     All other components within normal limits     Narrative:     Specimen Source->Urine   URINALYSIS MICROSCOPIC - Abnormal; Notable for the following components:    RBC, UA >100 (*)     All other components within normal limits    Narrative:     Specimen Source->Urine        All Lab Results:  Results for orders placed or performed during the hospital encounter of 05/05/24   CBC auto differential   Result Value Ref Range    WBC 8.91 3.90 - 12.70 K/uL    RBC 4.57 (L) 4.60 - 6.20 M/uL    Hemoglobin 14.6 14.0 - 18.0 g/dL    Hematocrit 43.3 40.0 - 54.0 %    MCV 95 82 - 98 fL    MCH 31.9 (H) 27.0 - 31.0 pg    MCHC 33.7 32.0 - 36.0 g/dL    RDW 11.8 11.5 - 14.5 %    Platelets 234 150 - 450 K/uL    MPV 9.8 9.2 - 12.9 fL    Immature Granulocytes 0.4 0.0 - 0.5 %    Gran # (ANC) 5.8 1.8 - 7.7 K/uL    Immature Grans (Abs) 0.04 0.00 - 0.04 K/uL    Lymph # 2.4 1.0 - 4.8 K/uL    Mono # 0.6 0.3 - 1.0 K/uL    Eos # 0.1 0.0 - 0.5 K/uL    Baso # 0.03 0.00 - 0.20 K/uL    nRBC 0 0 /100 WBC    Gran % 65.0 38.0 - 73.0 %    Lymph % 26.5 18.0 - 48.0 %    Mono % 7.2 4.0 - 15.0 %    Eosinophil % 0.6 0.0 - 8.0 %    Basophil % 0.3 0.0 - 1.9 %    Differential Method Automated    Comprehensive metabolic panel   Result Value Ref Range    Sodium 142 136 - 145 mmol/L    Potassium 4.2 3.5 - 5.1 mmol/L    Chloride 108 95 - 110 mmol/L    CO2 22 (L) 23 - 29 mmol/L    Glucose 121 (H) 70 - 110 mg/dL    BUN 18 6 - 20 mg/dL    Creatinine 1.6 (H) 0.5 - 1.4 mg/dL    Calcium 9.7 8.7 - 10.5 mg/dL    Total Protein 7.3 6.0 - 8.4 g/dL    Albumin 4.0 3.5 - 5.2 g/dL    Total Bilirubin 0.7 0.1 - 1.0 mg/dL    Alkaline Phosphatase 59 55 - 135 U/L    AST 27 10 - 40 U/L    ALT 41 10 - 44 U/L    eGFR 52 (A) >60 mL/min/1.73 m^2    Anion Gap 12 8 - 16 mmol/L   Urinalysis, Reflex to Urine Culture Urine, Clean Catch    Specimen: Urine   Result Value Ref Range    Specimen UA Urine, Clean Catch     Color, UA Yellow Yellow, Straw, Jackeline    Appearance, UA Clear Clear    pH, UA 7.0 5.0 - 8.0    Specific Gravity, UA 1.030 1.005 -  1.030    Protein, UA 1+ (A) Negative    Glucose, UA Negative Negative    Ketones, UA Trace (A) Negative    Bilirubin (UA) Negative Negative    Occult Blood UA 3+ (A) Negative    Nitrite, UA Negative Negative    Urobilinogen, UA Negative <2.0 EU/dL    Leukocytes, UA Negative Negative   Urinalysis Microscopic   Result Value Ref Range    RBC, UA >100 (H) 0 - 4 /hpf    WBC, UA 0 0 - 5 /hpf    Bacteria None None-Occ /hpf    Squam Epithel, UA 0 /hpf    Hyaline Casts, UA 0 0-1/lpf /lpf    Microscopic Comment SEE COMMENT         Imaging Results:  Imaging Results              CT Renal Stone Study ABD Pelvis WO (Final result)  Result time 05/05/24 01:16:30      Final result by Ava Mohr MD (05/05/24 01:16:30)                   Impression:      3 mm obstructive stone at the right UVJ with resultant mild to moderate hydroureteronephrosis.      Electronically signed by: Ava Mohr  Date:    05/05/2024  Time:    01:16               Narrative:    EXAMINATION:  CT RENAL STONE STUDY ABD PELVIS WO    CLINICAL HISTORY:  Flank pain, kidney stone suspected;    TECHNIQUE:  Low dose axial images, sagittal and coronal reformations were obtained from the lung bases to the pubic symphysis, Oral contrast was not administered.    COMPARISON:  None    FINDINGS:  Heart: Normal in size. No pericardial effusion.    Lung Bases: Well aerated, without consolidation or pleural fluid.    Liver: Normal in size and attenuation, with no focal hepatic lesions.    Gallbladder: No calcified gallstones.    Bile Ducts: No evidence of dilated ducts.    Pancreas: No mass or peripancreatic fat stranding.    Spleen: Unremarkable.    Adrenals: Unremarkable.    Kidneys/ Ureters: 3 mm obstructive stone at the right UVJ with resultant mild to moderate hydroureteronephrosis.    Bladder: No evidence of wall thickening.    Reproductive organs: Unremarkable.    GI Tract/Mesentery: No evidence of bowel obstruction or inflammation.    Peritoneal Space:  No ascites. No free air.    Retroperitoneum: No significant adenopathy.    Abdominal wall: Unremarkable.    Vasculature: No significant atherosclerosis or aneurysm.    Bones: No acute fracture.                                            The Emergency Provider reviewed the vital signs and test results, which are outlined above.     ED Discussion       3:16 AM: Reassessed pt at this time. Discussed with pt all pertinent ED information and results. Discussed pt dx and plan of tx. Gave pt all f/u and return to the ED instructions. All questions and concerns were addressed at this time. Pt expresses understanding of information and instructions, and is comfortable with plan to discharge. Pt is stable for discharge.    I discussed with patient and/or family/caretaker that evaluation in the ED does not suggest any emergent or life threatening medical conditions requiring immediate intervention beyond what was provided in the ED, and I believe patient is safe for discharge.  Regardless, an unremarkable evaluation in the ED does not preclude the development or presence of a serious of life threatening condition. As such, patient was instructed to return immediately for any worsening or change in current symptoms.     DDX include but are not limited to kidney stone, appendicitis, pyelonephritis, UTI, and aortic aneurysm.       Medical Decision Making  Amount and/or Complexity of Data Reviewed  Labs: ordered. Decision-making details documented in ED Course.  Radiology: ordered. Decision-making details documented in ED Course.    Risk  Prescription drug management.                ED Medication(s):  Medications   ketorolac injection 15 mg (15 mg Intravenous Given 5/5/24 0054)   ondansetron injection 8 mg (8 mg Intravenous Given 5/5/24 0054)   sodium chloride 0.9% bolus 1,000 mL 1,000 mL (1,000 mLs Intravenous New Bag 5/5/24 0208)   morphine injection 6 mg (6 mg Intravenous Given 5/5/24 0249)       Discharge Medication List as of  5/5/2024  3:16 AM        START taking these medications    Details   HYDROcodone-acetaminophen (NORCO) 7.5-325 mg per tablet Take 1 tablet by mouth every 6 (six) hours as needed for Pain., Starting Sun 5/5/2024, Print      ondansetron (ZOFRAN) 4 MG tablet Take 1 tablet (4 mg total) by mouth every 8 (eight) hours as needed for Nausea., Starting Sun 5/5/2024, Print              Follow-up Information       [facility] In 2 days.    Specialty: Urology  Contact information:  [id number] [place] [address] [phone]             [facility] - Emergency Dept..    Specialty: Emergency Medicine  Why: As needed, If symptoms worsen  Contact information:  [address] [place] [id number] [phone]                               Scribe Attestation:   Scribe #1: I performed the above scribed service and the documentation accurately describes the services I performed. I attest to the accuracy of the note.     Attending:   Physician Attestation Statement for Scribe #1: I, Yenifer Moncada MD, personally performed the services described in this documentation, as scribed by Lacie Choe, in my presence, and it is both accurate and complete.       Scribe Attestation:   Scribe #1: I performed the above scribed service and the documentation accurately describes the services I performed. I attest to the accuracy of the note.         Clinical Impression       ICD-10-CM ICD-9-CM   1. Ureterolithiasis  N20.1 592.1       Disposition:   Disposition: Discharged  Condition: Stable        Yenifer Moncada MD  05/05/24 0529

## 2024-05-06 ENCOUNTER — ANESTHESIA (OUTPATIENT)
Dept: SURGERY | Facility: HOSPITAL | Age: 49
End: 2024-05-06
Payer: COMMERCIAL

## 2024-05-06 ENCOUNTER — ANESTHESIA EVENT (OUTPATIENT)
Dept: SURGERY | Facility: HOSPITAL | Age: 49
End: 2024-05-06
Payer: COMMERCIAL

## 2024-05-06 VITALS
SYSTOLIC BLOOD PRESSURE: 121 MMHG | HEART RATE: 60 BPM | TEMPERATURE: 98 F | WEIGHT: 230 LBS | DIASTOLIC BLOOD PRESSURE: 65 MMHG | RESPIRATION RATE: 18 BRPM | HEIGHT: 72 IN | OXYGEN SATURATION: 93 % | BODY MASS INDEX: 31.15 KG/M2

## 2024-05-06 PROBLEM — N20.0 KIDNEY STONE ON RIGHT SIDE: Status: ACTIVE | Noted: 2024-05-06

## 2024-05-06 PROCEDURE — 88300 SURGICAL PATH GROSS: CPT | Mod: 26,,, | Performed by: STUDENT IN AN ORGANIZED HEALTH CARE EDUCATION/TRAINING PROGRAM

## 2024-05-06 PROCEDURE — 71000033 HC RECOVERY, INTIAL HOUR: Performed by: UROLOGY

## 2024-05-06 PROCEDURE — C1769 GUIDE WIRE: HCPCS | Performed by: UROLOGY

## 2024-05-06 PROCEDURE — 87086 URINE CULTURE/COLONY COUNT: CPT | Performed by: INTERNAL MEDICINE

## 2024-05-06 PROCEDURE — G0378 HOSPITAL OBSERVATION PER HR: HCPCS

## 2024-05-06 PROCEDURE — 52352 CYSTOURETERO W/STONE REMOVE: CPT | Mod: RT,,, | Performed by: UROLOGY

## 2024-05-06 PROCEDURE — 63600175 PHARM REV CODE 636 W HCPCS: Performed by: NURSE ANESTHETIST, CERTIFIED REGISTERED

## 2024-05-06 PROCEDURE — 52332 CYSTOSCOPY AND TREATMENT: CPT | Mod: 51,RT,, | Performed by: UROLOGY

## 2024-05-06 PROCEDURE — C2617 STENT, NON-COR, TEM W/O DEL: HCPCS | Performed by: UROLOGY

## 2024-05-06 PROCEDURE — 88300 SURGICAL PATH GROSS: CPT | Performed by: STUDENT IN AN ORGANIZED HEALTH CARE EDUCATION/TRAINING PROGRAM

## 2024-05-06 PROCEDURE — 63600175 PHARM REV CODE 636 W HCPCS: Performed by: NURSE PRACTITIONER

## 2024-05-06 PROCEDURE — 25000003 PHARM REV CODE 250: Performed by: NURSE ANESTHETIST, CERTIFIED REGISTERED

## 2024-05-06 PROCEDURE — 82365 CALCULUS SPECTROSCOPY: CPT | Performed by: INTERNAL MEDICINE

## 2024-05-06 PROCEDURE — 27201423 OPTIME MED/SURG SUP & DEVICES STERILE SUPPLY: Performed by: UROLOGY

## 2024-05-06 PROCEDURE — 25000003 PHARM REV CODE 250: Performed by: UROLOGY

## 2024-05-06 PROCEDURE — 36000707: Performed by: UROLOGY

## 2024-05-06 PROCEDURE — C1758 CATHETER, URETERAL: HCPCS | Performed by: UROLOGY

## 2024-05-06 PROCEDURE — 37000008 HC ANESTHESIA 1ST 15 MINUTES: Performed by: UROLOGY

## 2024-05-06 PROCEDURE — 36000706: Performed by: UROLOGY

## 2024-05-06 PROCEDURE — 96361 HYDRATE IV INFUSION ADD-ON: CPT

## 2024-05-06 PROCEDURE — 99215 OFFICE O/P EST HI 40 MIN: CPT | Mod: 25,,, | Performed by: UROLOGY

## 2024-05-06 PROCEDURE — 37000009 HC ANESTHESIA EA ADD 15 MINS: Performed by: UROLOGY

## 2024-05-06 DEVICE — STENT URETERAL UNIV 6FR 26CM: Type: IMPLANTABLE DEVICE | Site: URETER | Status: FUNCTIONAL

## 2024-05-06 RX ORDER — SUCCINYLCHOLINE CHLORIDE 20 MG/ML
INJECTION INTRAMUSCULAR; INTRAVENOUS
Status: DISCONTINUED | OUTPATIENT
Start: 2024-05-06 | End: 2024-05-06

## 2024-05-06 RX ORDER — ONDANSETRON HYDROCHLORIDE 2 MG/ML
4 INJECTION, SOLUTION INTRAVENOUS ONCE AS NEEDED
Status: DISCONTINUED | OUTPATIENT
Start: 2024-05-06 | End: 2024-05-06 | Stop reason: HOSPADM

## 2024-05-06 RX ORDER — OXYCODONE HYDROCHLORIDE 5 MG/1
5 TABLET ORAL EVERY 6 HOURS PRN
Qty: 20 TABLET | Refills: 0 | Status: SHIPPED | OUTPATIENT
Start: 2024-05-06

## 2024-05-06 RX ORDER — HYDROMORPHONE HYDROCHLORIDE 2 MG/ML
0.2 INJECTION, SOLUTION INTRAMUSCULAR; INTRAVENOUS; SUBCUTANEOUS EVERY 5 MIN PRN
Status: DISCONTINUED | OUTPATIENT
Start: 2024-05-06 | End: 2024-05-06 | Stop reason: HOSPADM

## 2024-05-06 RX ORDER — NITROFURANTOIN 25; 75 MG/1; MG/1
100 CAPSULE ORAL 2 TIMES DAILY
Qty: 6 CAPSULE | Refills: 0 | Status: SHIPPED | OUTPATIENT
Start: 2024-05-06 | End: 2024-05-10

## 2024-05-06 RX ORDER — MEPERIDINE HYDROCHLORIDE 25 MG/ML
12.5 INJECTION INTRAMUSCULAR; INTRAVENOUS; SUBCUTANEOUS ONCE AS NEEDED
Status: DISCONTINUED | OUTPATIENT
Start: 2024-05-06 | End: 2024-05-06 | Stop reason: HOSPADM

## 2024-05-06 RX ORDER — TAMSULOSIN HYDROCHLORIDE 0.4 MG/1
0.4 CAPSULE ORAL DAILY
Qty: 5 CAPSULE | Refills: 0 | Status: SHIPPED | OUTPATIENT
Start: 2024-05-06 | End: 2024-05-12

## 2024-05-06 RX ORDER — ONDANSETRON HYDROCHLORIDE 2 MG/ML
INJECTION, SOLUTION INTRAVENOUS
Status: DISCONTINUED | OUTPATIENT
Start: 2024-05-06 | End: 2024-05-06

## 2024-05-06 RX ORDER — ROCURONIUM BROMIDE 10 MG/ML
INJECTION, SOLUTION INTRAVENOUS
Status: DISCONTINUED | OUTPATIENT
Start: 2024-05-06 | End: 2024-05-06

## 2024-05-06 RX ORDER — HYDROCODONE BITARTRATE AND ACETAMINOPHEN 5; 325 MG/1; MG/1
1 TABLET ORAL EVERY 4 HOURS PRN
Status: DISCONTINUED | OUTPATIENT
Start: 2024-05-06 | End: 2024-05-06 | Stop reason: HOSPADM

## 2024-05-06 RX ORDER — SOLIFENACIN SUCCINATE 5 MG/1
5 TABLET, FILM COATED ORAL DAILY
Qty: 5 TABLET | Refills: 0 | Status: SHIPPED | OUTPATIENT
Start: 2024-05-06 | End: 2024-05-12

## 2024-05-06 RX ORDER — ONDANSETRON HYDROCHLORIDE 2 MG/ML
4 INJECTION, SOLUTION INTRAVENOUS DAILY PRN
Status: DISCONTINUED | OUTPATIENT
Start: 2024-05-06 | End: 2024-05-06 | Stop reason: HOSPADM

## 2024-05-06 RX ORDER — PHENAZOPYRIDINE HYDROCHLORIDE 100 MG/1
100 TABLET, FILM COATED ORAL 3 TIMES DAILY PRN
Qty: 30 TABLET | Refills: 0 | Status: SHIPPED | OUTPATIENT
Start: 2024-05-06 | End: 2024-05-17

## 2024-05-06 RX ORDER — FENTANYL CITRATE 50 UG/ML
INJECTION, SOLUTION INTRAMUSCULAR; INTRAVENOUS
Status: DISCONTINUED | OUTPATIENT
Start: 2024-05-06 | End: 2024-05-06

## 2024-05-06 RX ORDER — DEXAMETHASONE SODIUM PHOSPHATE 4 MG/ML
INJECTION, SOLUTION INTRA-ARTICULAR; INTRALESIONAL; INTRAMUSCULAR; INTRAVENOUS; SOFT TISSUE
Status: DISCONTINUED | OUTPATIENT
Start: 2024-05-06 | End: 2024-05-06

## 2024-05-06 RX ORDER — FENTANYL CITRATE 50 UG/ML
25 INJECTION, SOLUTION INTRAMUSCULAR; INTRAVENOUS EVERY 5 MIN PRN
Status: DISCONTINUED | OUTPATIENT
Start: 2024-05-06 | End: 2024-05-06 | Stop reason: HOSPADM

## 2024-05-06 RX ORDER — MIDAZOLAM HYDROCHLORIDE 1 MG/ML
INJECTION INTRAMUSCULAR; INTRAVENOUS
Status: DISCONTINUED | OUTPATIENT
Start: 2024-05-06 | End: 2024-05-06

## 2024-05-06 RX ORDER — TAMSULOSIN HYDROCHLORIDE 0.4 MG/1
0.4 CAPSULE ORAL DAILY
Status: DISCONTINUED | OUTPATIENT
Start: 2024-05-06 | End: 2024-05-06 | Stop reason: HOSPADM

## 2024-05-06 RX ORDER — PROPOFOL 10 MG/ML
VIAL (ML) INTRAVENOUS
Status: DISCONTINUED | OUTPATIENT
Start: 2024-05-06 | End: 2024-05-06

## 2024-05-06 RX ORDER — LIDOCAINE HYDROCHLORIDE 10 MG/ML
INJECTION, SOLUTION EPIDURAL; INFILTRATION; INTRACAUDAL; PERINEURAL
Status: DISCONTINUED | OUTPATIENT
Start: 2024-05-06 | End: 2024-05-06

## 2024-05-06 RX ORDER — OXYCODONE AND ACETAMINOPHEN 5; 325 MG/1; MG/1
1 TABLET ORAL
Status: DISCONTINUED | OUTPATIENT
Start: 2024-05-06 | End: 2024-05-06 | Stop reason: HOSPADM

## 2024-05-06 RX ADMIN — SUCCINYLCHOLINE CHLORIDE 160 MG: 20 INJECTION, SOLUTION INTRAMUSCULAR; INTRAVENOUS; PARENTERAL at 11:05

## 2024-05-06 RX ADMIN — FENTANYL CITRATE 100 MCG: 50 INJECTION, SOLUTION INTRAMUSCULAR; INTRAVENOUS at 11:05

## 2024-05-06 RX ADMIN — LIDOCAINE HYDROCHLORIDE 50 MG: 10 SOLUTION INTRAVENOUS at 11:05

## 2024-05-06 RX ADMIN — SODIUM CHLORIDE, POTASSIUM CHLORIDE, SODIUM LACTATE AND CALCIUM CHLORIDE: 600; 310; 30; 20 INJECTION, SOLUTION INTRAVENOUS at 10:05

## 2024-05-06 RX ADMIN — KETOROLAC TROMETHAMINE 15 MG: 30 INJECTION, SOLUTION INTRAMUSCULAR; INTRAVENOUS at 05:05

## 2024-05-06 RX ADMIN — KETOROLAC TROMETHAMINE 15 MG: 30 INJECTION, SOLUTION INTRAMUSCULAR; INTRAVENOUS at 12:05

## 2024-05-06 RX ADMIN — ONDANSETRON 4 MG: 2 INJECTION INTRAMUSCULAR; INTRAVENOUS at 11:05

## 2024-05-06 RX ADMIN — PROPOFOL 200 MG: 10 INJECTION, EMULSION INTRAVENOUS at 11:05

## 2024-05-06 RX ADMIN — DEXAMETHASONE SODIUM PHOSPHATE 4 MG: 4 INJECTION, SOLUTION INTRA-ARTICULAR; INTRALESIONAL; INTRAMUSCULAR; INTRAVENOUS; SOFT TISSUE at 11:05

## 2024-05-06 RX ADMIN — ROCURONIUM BROMIDE 5 MG: 10 INJECTION, SOLUTION INTRAVENOUS at 11:05

## 2024-05-06 RX ADMIN — MIDAZOLAM HYDROCHLORIDE 2 MG: 1 INJECTION, SOLUTION INTRAMUSCULAR; INTRAVENOUS at 11:05

## 2024-05-06 RX ADMIN — SODIUM CHLORIDE, SODIUM LACTATE, POTASSIUM CHLORIDE, AND CALCIUM CHLORIDE: .6; .31; .03; .02 INJECTION, SOLUTION INTRAVENOUS at 11:05

## 2024-05-06 RX ADMIN — TAMSULOSIN HYDROCHLORIDE 0.4 MG: 0.4 CAPSULE ORAL at 09:05

## 2024-05-06 NOTE — H&P (VIEW-ONLY)
Chief Complaint:  Right distal ureteral stone    HPI:   5/6/24-  48-year-old gentleman who states that approximately 2 days ago he began to have severe right flank pain with persistent nausea and vomiting, they did not relief.  Patient reported to the emergency department twice, creatinine was elevated due to dehydration, still has not pass the stone.  No previous urological history, no family history of urological cancers, his brother recently just passed his 1st stone as well.        Allergies:  Patient has no known allergies.    Medications:  See MAR    Review of Systems:  General: No fever, chills, fatigability, or weight loss.  Skin: No rashes, itching, or changes in color or texture of skin.  Chest: Denies CISNEROS, cyanosis, wheezing, cough, and sputum production.  Abdomen: Appetite fine. No weight loss. Denies diarrhea, abdominal pain, hematemesis, or blood in stool.  Musculoskeletal: No joint stiffness or swelling. Denies back pain.  : As above.  All other review of systems negative.    PMH:   has a past medical history of Anxiety, General anesthetics causing adverse effect in therapeutic use, and Hyperlipidemia.    PSH:   has a past surgical history that includes Fracture surgery; Achilles tendon surgery (Right); Appendectomy (N/A, 5/24/2018); Neck mass excision (Left, 12/7/2018); and Esophagogastroduodenoscopy (N/A, 6/29/2021).    FamHx: family history includes Heart disease in his father.    SocHx:  reports that he quit smoking about 3 years ago. His smoking use included vaping with nicotine. He started smoking about 20 years ago. He has a 17 pack-year smoking history. He has never used smokeless tobacco. He reports current alcohol use of about 1.7 standard drinks of alcohol per week. He reports that he does not use drugs.      Physical Exam:  Vitals:    05/06/24 0734   BP:    Pulse: 72   Resp: 18   Temp:      General: A&Ox3, no apparent distress, no deformities  Neck: No masses, normal ROM  Lungs: normal  inspiration, no use of accessory muscles  Heart: normal pulse, no arrhythmias  Abdomen: Soft, NT, ND, no masses, no hernias, no hepatosplenomegaly  Skin: The skin is warm and dry. No jaundice.  Ext: No c/c/e.    Labs/Studies:   CBC demonstrates normal white count of 10.85, please see the chart.    BMP demonstrates a BUN and creatinine of 20 and 1.9 respectively.  UA trace protein, 1+ ketones, greater than 100 RBCs.  CT stone protocol 3 mm right UVJ stone 5/24    Impression/Plan:     Right ureteral stone- patient is still with signs of dehydration, and will bolus and continue to strain.  Patient is now on tamsulosin.  Informed the patient to continue to remain NPO, if he does not pass within the next few hours will take for cystoscopy with right ureteroscopy.  Patient is aware that Dr. Garcia will be doing his procedure.

## 2024-05-06 NOTE — HPI
Manjinder Witt is a 48 y.o. male with a PMH  has a past medical history of Anxiety, General anesthetics causing adverse effect in therapeutic use, and Hyperlipidemia.  Presented to the ER for evaluation of right-sided flank pain which started last night.  Patient was diagnosed with a right-sided kidney stone and discharged home with pain medication and strainer.  Patient reports medication he has been taking home has not improving his symptoms which prompted return visit to ER this evening.  Associated symptoms include intermittent nausea and vomiting.  Denies any fever, aches, chills, sweats, chest pain, palpitations, shortness for breath, dyspnea exertion, abdominal pain, hematuria, melena, hematochezia, or any other symptoms at this time.    ER workup revealed creatinine level 1 6 which worsened to 1.9 since previous ER visit.  CT abdomen and pelvis renal study revealed:[3 mm obstructive stone at the right UVJ with resultant mild to moderate hydroureteronephrosis].  Patient received Flomax, 1.5 L of normal saline, 4 mg morphine, 4 mg Zofran, 30 mg Toradol in ER.  On-call urologist consulted.  Recommend overnight observation for IV fluids and Flomax.  Urology will see patient tomorrow as consult.  Patient and family at bedside in agreement with treatment plan.  Patient will be admitted under observation status.    PCP: Opal Lara

## 2024-05-06 NOTE — PLAN OF CARE
Pt admitted from ER report given from JAVID Gutierres, Pt is AAO on RA ambulatory, given urinal to use and able to strain urine for stones, UA sent no stones noted in the stainer.  Problem: Adult Inpatient Plan of Care  Goal: Plan of Care Review  Outcome: Progressing  Goal: Patient-Specific Goal (Individualized)  Outcome: Progressing  Goal: Absence of Hospital-Acquired Illness or Injury  Outcome: Progressing  Goal: Optimal Comfort and Wellbeing  Outcome: Progressing

## 2024-05-06 NOTE — HOSPITAL COURSE
48 y.o. male presented to the ER for evaluation of right-sided flank pain which started last night.  Patient was diagnosed with a right-sided kidney stone and discharged home with pain medication and strainer.  Patient reports medication he has been taking home has not improving his symptoms which prompted return visit to ER this evening.  Associated symptoms include intermittent nausea and vomiting.  Denies any fever, aches, chills, sweats, chest pain, palpitations, shortness for breath, dyspnea exertion, abdominal pain, hematuria, melena, hematochezia, or any other symptoms at this time.     ER workup revealed creatinine level 1 6 which worsened to 1.9 since previous ER visit.    CT abdomen and pelvis renal study revealed:[3 mm obstructive stone at the right UVJ with resultant mild to moderate hydroureteronephrosis].  Patient received Flomax, 1.5 L of normal saline, 4 mg morphine, 4 mg Zofran, 30 mg Toradol in ER.    Urology consulted performed cystoscopy with right ureteroscopy per Dr. Garcia on 5/6. Patient will need follow up on wed 5/8/24 for stent removal. Per Dr. Garcia, he will schedule the patient for a follow up in clinic outpatient. Urology filled prescription at time of discharge. Patient educated on importance of follow up with urology for stent removal. Educated on diet improvements to help with GERD s/s and educated on importance of limiting intake of TUMS as patient has been taking them daily. Patient also advised to schedule an appointment with PCP within the next 3-5 days.     Patient seen and examined on the day of discharge.  All questions and concerns were addressed prior to discharge.    Face to face encounter with patient: 36

## 2024-05-06 NOTE — ASSESSMENT & PLAN NOTE
Patient is chronically on statin.will continue for now. Last Lipid Panel:   Lab Results   Component Value Date    CHOL 215 (H) 10/12/2023    HDL 34 (L) 10/12/2023    LDLCALC Invalid, Trig>400.0 10/12/2023    TRIG 408 (H) 10/12/2023    CHOLHDL 15.8 (L) 10/12/2023     Plan:  -Continue home medication  -low fat/low calorie diet

## 2024-05-06 NOTE — TRANSFER OF CARE
Anesthesia Transfer of Care Note    Patient: Manjinder Witt    Procedure(s) Performed: Procedure(s) (LRB):  CYSTOSCOPY, WITH URETERAL STENT INSERTION (Right)  URETEROSCOPY (Right)  EXTRACTION - STONE (Right)    Patient location: PACU    Anesthesia Type: general    Transport from OR: Transported from OR on room air with adequate spontaneous ventilation    Post pain: adequate analgesia    Post assessment: no apparent anesthetic complications    Post vital signs: stable    Level of consciousness: sedated    Nausea/Vomiting: no nausea/vomiting    Complications: none    Transfer of care protocol was followed      Last vitals: Visit Vitals  /76 (BP Location: Right arm, Patient Position: Lying)   Pulse (!) 51   Temp 37.1 °C (98.8 °F) (Oral)   Resp 17   Ht 6' (1.829 m)   Wt 104.3 kg (230 lb)   SpO2 95%   BMI 31.19 kg/m²

## 2024-05-06 NOTE — OP NOTE
[facility]  Operative Note    Date: 05/06/2024    Pre-Op Diagnosis: right ureteral stone    Post-Op Diagnosis: same    Procedure(s) Performed:   1.  Right ureteroscopy, basket stone extraction  2.  Cystoscopy  3.  Placement of a right six Hebrew by 26 cm JJ ureteral stent with strings attached  4.  Fluoro < 1 h    Specimen(s):  Stone for analysis    Surgeon: Duke Garcia MD    Anesthesia: General LMA anesthesia    Indications: Manjinder Witt is a 48 y.o. male with a right ureteral stone, presenting for definitive stone management.  He currently does not have a JJ ureteral stent in place.      Findings:  Right distal ureteral stone noted just proximal to the UVJ, basket extracted, stent placed with strings attached    Estimated Blood Loss: min    Drains: 6 Fr x 26 cm JJ ureteral stent with strings    Procedure in detail:  After informed consent was obtained, the patient was brought the the cystoscopy suite and placed in the supine position.  SCDs were applied and working.  Anesthesia was administered.  The patient was then placed in the dorsal lithotomy position and prepped and draped in the usual sterile fashion.      A rigid cystoscope in a 22 Fr sheath was introduced into the patient's urethra.  This passed easily.  The entire urethra was visualized which showed no strictures or masses.  Formal cystoscopy was performed which revealed no masses or lesions suspicious for malignancy, no bladder stones, no bladder diverticuli, no trabeculations.  The ureteral orifices were visualized in the normal anatomic position bilaterally and  efflux was visualized.      A Motion wire was passed up the right ureteral orifice and up into the kidney.  This passed easily and placement was confirmed using fluoro.  The cystoscope was removed keeping the guidewire in place and the wire was secured to the drape.      An 8 Fr rigid ureteroscope was passed into the patient's bladder alongside the wire under  direct vision.  It was then passed through the right ureteral orifice alongside the wire and was advanced as proximally as the patient's anatomy would allow, which was to the iliacs. A stone was noted just proximal to the UVJ.  A Nitinol tipless basket was introduced through the ureteroscope and the stone was extracted. The ureteroscope was removed under direct vision, there was no evidence of ureteral injury or perforation. The bladder was drained with cystoscope removed.     The wire was backloaded through the scope.  Over the indwelling wire, a 6 Fr x 26 cm JJ ureteral stent with strings was passed over the wire and up into the renal pelvis using fluoro.  When the coil appeared to be in good position in the kidney and the radio-opaque marker of the pusher was at the inferior pubis, the wire was removed under continuous fluoro.  Good coils were seen in the kidney and the bladder using fluoro.      The patient tolerated the procedure well and was transferred to the recovery room in stable condition.      Disposition:  The patient will follow up with me in 6 weeks with a renal US    Discharge instructions:  Discharge once patient voids. Patient has a stent in place with strings attached, no soaking in tubs until stent is removed. Stent is left hanging from the urethra. Patient can remove stent 05/08 by slowly pulling the string. Pain control with tylenol, motrin, and PRN narcotics. Patient should also complete the antibiotics provided. Hematuria and flank pain are normal with stent in place. Patient can resume prior diet and activity level immediately, no other restrictions.    Duke Garcia MD

## 2024-05-06 NOTE — H&P
[facility] - [address] Medicine  History & Physical    Patient Name: Manjinder Witt  MRN: [id number]  Patient Class: OP- Observation  Admission Date: 5/5/2024  Attending Physician: Lei Jarrell MD   Primary Care Provider: Opal Lara MD         Patient information was obtained from patient, spouse/SO, past medical records, and ER records.     Subjective:     Principal Problem:Kidney stone on right side    Chief Complaint:   Chief Complaint   Patient presents with    Flank Pain     Pt c/o right-sided flank pain.  He was seen here last night for the same complaint and was told he has a kidney stone.  Pt states pain has worsened.        HPI: Manjinder Witt is a 48 y.o. male with a PMH  has a past medical history of Anxiety, General anesthetics causing adverse effect in therapeutic use, and Hyperlipidemia.  Presented to the ER for evaluation of right-sided flank pain which started last night.  Patient was diagnosed with a right-sided kidney stone and discharged home with pain medication and strainer.  Patient reports medication he has been taking home has not improving his symptoms which prompted return visit to ER this evening.  Patient reports he consumes up to 8 Tums tablets stable for as long as he can remember to treat his acid reflux.  Denies consuming milk or excessive calcium supplements.  Associated symptoms include intermittent nausea and vomiting.  Denies any fever, aches, chills, sweats, chest pain, palpitations, shortness for breath, dyspnea exertion, abdominal pain, hematuria, melena, hematochezia, or any other symptoms at this time.    ER workup revealed creatinine level 1 6 which worsened to 1.9 since previous ER visit.  CT abdomen and pelvis renal study revealed:[3 mm obstructive stone at the right UVJ with resultant mild to moderate hydroureteronephrosis].  Patient received Flomax, 1.5 L of normal saline, 4 mg morphine, 4 mg Zofran, 30 mg Toradol in ER.  On-call urologist  "consulted.  Recommend overnight observation for IV fluids and Flomax.  Urology will see patient tomorrow as consult.  Patient and family at bedside in agreement with treatment plan.  Patient will be admitted under observation status.    PCP: Opal Lara    Past Medical History:   Diagnosis Date    Anxiety     General anesthetics causing adverse effect in therapeutic use     recent sx achilles tendon, given "something to dry up my sinuses and I couldn't hardly breath"    Hyperlipidemia        Past Surgical History:   Procedure Laterality Date    ACHILLES TENDON SURGERY Right     APPENDECTOMY N/A 5/24/2018    Procedure: APPENDECTOMY;  Surgeon: Jericho Kessler MD;  Location: [facility] OR;  Service: General;  Laterality: N/A;    ESOPHAGOGASTRODUODENOSCOPY N/A 6/29/2021    Procedure: ESOPHAGOGASTRODUODENOSCOPY (EGD);  Surgeon: Beatriz Hines MD;  Location: [facility] ENDO;  Service: Endoscopy;  Laterality: N/A;    FRACTURE SURGERY      left leg pins an screws     NECK MASS EXCISION Left 12/7/2018    Procedure: EXCISION, MASS, NECK;  Surgeon: Alcira Dale MD;  Location: [facility] OR;  Service: ENT;  Laterality: Left;       Review of patient's allergies indicates:  No Known Allergies    Current Facility-Administered Medications   Medication Dose Route Frequency Provider Last Rate Last Admin    acetaminophen suppository 650 mg  650 mg Rectal Q4H PRN Nicanor Jarrell NP        acetaminophen tablet 650 mg  650 mg Oral Q8H PRN Nicanor Jarrell NP        aluminum-magnesium hydroxide-simethicone 200-200-20 mg/5 mL suspension 30 mL  30 mL Oral QID PRN Nicanor Jarrell NP        dextrose 10% bolus 125 mL 125 mL  12.5 g Intravenous PRN Nicanor Jarrell NP        dextrose 10% bolus 250 mL 250 mL  25 g Intravenous PRN Nicanor Jarrell NP        glucagon (human recombinant) injection 1 mg  1 mg Intramuscular PRN Nicanor Jarrell NP        glucose chewable tablet 16 g  16 g Oral PRN Nicanor Jarrell NP        glucose " chewable tablet 24 g  24 g Oral PRN Nicanor Jarrell NP        HYDROcodone-acetaminophen 5-325 mg per tablet 1 tablet  1 tablet Oral Q6H PRN Nicanor Jarrell NP        ketorolac injection 15 mg  15 mg Intravenous Q6H Nicanor Jarrell NP   15 mg at 05/06/24 0000    lactated ringers infusion   Intravenous Continuous Nicanor Jarrell  mL/hr at 05/05/24 2031 New Bag at 05/05/24 2031    melatonin tablet 6 mg  6 mg Oral Nightly PRN Nicanor Jarrell NP        morphine injection 2 mg  2 mg Intravenous Q4H PRN Nicanor Jarrell NP        naloxone 0.4 mg/mL injection 0.02 mg  0.02 mg Intravenous PRN Nicanor Jarrell NP        ondansetron injection 4 mg  4 mg Intravenous Q8H PRNicanor Ortega NP        polyethylene glycol packet 17 g  17 g Oral Daily PRN Nicanor Jarrell NP        promethazine tablet 25 mg  25 mg Oral Q6H PRN Nicanor Jarrell NP        simethicone chewable tablet 80 mg  1 tablet Oral QID PRN Nicanor Jarrell NP        sodium chloride 0.9% flush 3 mL  3 mL Intravenous Q12H PRNicanor Ortega NP         Family History       Problem Relation (Age of Onset)    Heart disease Father          Tobacco Use    Smoking status: Former     Current packs/day: 0.00     Average packs/day: 1 pack/day for 17.0 years (17.0 ttl pk-yrs)     Types: Vaping with nicotine, Cigarettes     Start date: 6/29/2003     Quit date: 6/29/2020     Years since quitting: 3.8    Smokeless tobacco: Never    Tobacco comments:     no smoking after m.n prior to surgery   Substance and Sexual Activity    Alcohol use: Yes     Alcohol/week: 1.7 standard drinks of alcohol     Types: 2 Standard drinks or equivalent per week     Comment: social  No alcohol 72h prior to sx    Drug use: No    Sexual activity: Yes     Partners: Female     Birth control/protection: None     Review of Systems   Constitutional:  Negative for chills, diaphoresis, fatigue and fever.   Gastrointestinal:  Positive for nausea and vomiting.    Genitourinary:  Positive for flank pain (right). Negative for hematuria.   All other systems reviewed and are negative.    Objective:     Vital Signs (Most Recent):  Temp: 98.2 °F (36.8 °C) (05/06/24 0003)  Pulse: (!) 48 (05/06/24 0003)  Resp: 18 (05/06/24 0003)  BP: (!) 102/52 (05/06/24 0003)  SpO2: (!) 93 % (05/06/24 0003) Vital Signs (24h Range):  Temp:  [97.7 °F (36.5 °C)-98.2 °F (36.8 °C)] 98.2 °F (36.8 °C)  Pulse:  [44-48] 48  Resp:  [16-24] 18  SpO2:  [93 %-100 %] 93 %  BP: (102-148)/(52-66) 102/52     Weight: 104.3 kg (230 lb)  Body mass index is 31.19 kg/m².     Physical Exam  Vitals and nursing note reviewed.   Constitutional:       General: He is awake. He is not in acute distress.     Appearance: Normal appearance. He is well-developed and well-groomed. He is not ill-appearing, toxic-appearing or diaphoretic.   HENT:      Head: Normocephalic and atraumatic.   Eyes:      Extraocular Movements: Extraocular movements intact.      Conjunctiva/sclera: Conjunctivae normal.   Cardiovascular:      Rate and Rhythm: Normal rate and regular rhythm.      Pulses: Normal pulses.      Heart sounds: Normal heart sounds. No murmur heard.  Pulmonary:      Effort: Pulmonary effort is normal.      Breath sounds: Normal breath sounds.   Abdominal:      General: Bowel sounds are normal.      Palpations: Abdomen is soft.      Tenderness: There is no abdominal tenderness. There is no guarding or rebound.   Musculoskeletal:      Cervical back: Normal range of motion and neck supple.      Comments: 5/5 strength throughout   Skin:     General: Skin is warm and dry.      Capillary Refill: Capillary refill takes less than 2 seconds.   Neurological:      General: No focal deficit present.      Mental Status: He is alert and oriented to person, place, and time. Mental status is at baseline.      GCS: GCS eye subscore is 4. GCS verbal subscore is 5. GCS motor subscore is 6.      Cranial Nerves: Cranial nerves 2-12 are intact.       Sensory: Sensation is intact.      Motor: Motor function is intact.   Psychiatric:         Mood and Affect: Mood normal.         Behavior: Behavior normal. Behavior is cooperative.              LABS:  Recent Results (from the past 24 hour(s))   HIV 1/2 Ag/Ab (4th Gen)    Collection Time: 05/05/24  5:37 PM   Result Value Ref Range    HIV 1/2 Ag/Ab Negative Negative   Hepatitis C Antibody    Collection Time: 05/05/24  5:37 PM   Result Value Ref Range    Hepatitis C Ab Negative Negative   HCV Virus Hold Specimen    Collection Time: 05/05/24  5:37 PM   Result Value Ref Range    HEP C Virus Hold Specimen Hold for HCV sendout    CBC auto differential    Collection Time: 05/05/24  5:37 PM   Result Value Ref Range    WBC 10.85 3.90 - 12.70 K/uL    RBC 4.35 (L) 4.60 - 6.20 M/uL    Hemoglobin 13.7 (L) 14.0 - 18.0 g/dL    Hematocrit 41.1 40.0 - 54.0 %    MCV 95 82 - 98 fL    MCH 31.5 (H) 27.0 - 31.0 pg    MCHC 33.3 32.0 - 36.0 g/dL    RDW 11.9 11.5 - 14.5 %    Platelets 190 150 - 450 K/uL    MPV 10.3 9.2 - 12.9 fL    Immature Granulocytes 0.4 0.0 - 0.5 %    Gran # (ANC) 8.9 (H) 1.8 - 7.7 K/uL    Immature Grans (Abs) 0.04 0.00 - 0.04 K/uL    Lymph # 1.1 1.0 - 4.8 K/uL    Mono # 0.8 0.3 - 1.0 K/uL    Eos # 0.0 0.0 - 0.5 K/uL    Baso # 0.03 0.00 - 0.20 K/uL    nRBC 0 0 /100 WBC    Gran % 81.5 (H) 38.0 - 73.0 %    Lymph % 10.3 (L) 18.0 - 48.0 %    Mono % 7.2 4.0 - 15.0 %    Eosinophil % 0.3 0.0 - 8.0 %    Basophil % 0.3 0.0 - 1.9 %    Platelet Estimate Appears normal     Differential Method Automated    Comprehensive metabolic panel    Collection Time: 05/05/24  5:37 PM   Result Value Ref Range    Sodium 138 136 - 145 mmol/L    Potassium 4.6 3.5 - 5.1 mmol/L    Chloride 108 95 - 110 mmol/L    CO2 20 (L) 23 - 29 mmol/L    Glucose 107 70 - 110 mg/dL    BUN 20 6 - 20 mg/dL    Creatinine 1.9 (H) 0.5 - 1.4 mg/dL    Calcium 9.3 8.7 - 10.5 mg/dL    Total Protein 7.0 6.0 - 8.4 g/dL    Albumin 3.9 3.5 - 5.2 g/dL    Total Bilirubin 2.0 (H)  0.1 - 1.0 mg/dL    Alkaline Phosphatase 61 55 - 135 U/L    AST 29 10 - 40 U/L    ALT 36 10 - 44 U/L    eGFR 43 (A) >60 mL/min/1.73 m^2    Anion Gap 10 8 - 16 mmol/L   Urinalysis - Clean Catch    Collection Time: 05/05/24 10:19 PM   Result Value Ref Range    Specimen UA Urine, Clean Catch     Color, UA Yellow Yellow, Straw, Jackeline    Appearance, UA Clear Clear    pH, UA 7.0 5.0 - 8.0    Specific Gravity, UA >1.030 (A) 1.005 - 1.030    Protein, UA Trace (A) Negative    Glucose, UA Negative Negative    Ketones, UA 1+ (A) Negative    Bilirubin (UA) Negative Negative    Occult Blood UA Negative Negative    Nitrite, UA Negative Negative    Urobilinogen, UA Negative <2.0 EU/dL    Leukocytes, UA Negative Negative       RADIOLOGY  CT Renal Stone Study ABD Pelvis WO    Result Date: 5/5/2024  EXAMINATION: CT RENAL STONE STUDY ABD PELVIS WO CLINICAL HISTORY: Flank pain, kidney stone suspected; TECHNIQUE: Low dose axial images, sagittal and coronal reformations were obtained from the lung bases to the pubic symphysis, Oral contrast was not administered. COMPARISON: None FINDINGS: Heart: Normal in size. No pericardial effusion. Lung Bases: Well aerated, without consolidation or pleural fluid. Liver: Normal in size and attenuation, with no focal hepatic lesions. Gallbladder: No calcified gallstones. Bile Ducts: No evidence of dilated ducts. Pancreas: No mass or peripancreatic fat stranding. Spleen: Unremarkable. Adrenals: Unremarkable. Kidneys/ Ureters: 3 mm obstructive stone at the right UVJ with resultant mild to moderate hydroureteronephrosis. Bladder: No evidence of wall thickening. Reproductive organs: Unremarkable. GI Tract/Mesentery: No evidence of bowel obstruction or inflammation. Peritoneal Space: No ascites. No free air. Retroperitoneum: No significant adenopathy. Abdominal wall: Unremarkable. Vasculature: No significant atherosclerosis or aneurysm. Bones: No acute fracture.     3 mm obstructive stone at the right UVJ  with resultant mild to moderate hydroureteronephrosis. Electronically signed by: Ava Mohr Date:    05/05/2024 Time:    01:16      EKG    MICROBIOLOGY    MDM     Amount and/or Complexity of Data Reviewed  Clinical lab tests: reviewed  Tests in the radiology section of CPT®: reviewed  Tests in the medicine section of CPT®: reviewed  Discussion of test results with the performing providers: yes  Decide to obtain previous medical records or to obtain history from someone other than the patient: yes  Obtain history from someone other than the patient: yes  Review and summarize past medical records: yes  Discuss the patient with other providers: yes  Independent visualization of images, tracings, or specimens: yes        Assessment/Plan:     * Kidney stone on right side  Plan:  -NPO@MN  -IVFs  -flomax  -analgesics prn  -urology consult      GERD (gastroesophageal reflux disease)  Chronic. Stable. Currently asymptomatic. Home medications include PPI/Antacids as needed.  Plan:  -Continue PPI/Antacids as needed         Mixed hyperlipidemia  Patient is chronically on statin.will continue for now. Last Lipid Panel:   Lab Results   Component Value Date    CHOL 215 (H) 10/12/2023    HDL 34 (L) 10/12/2023    LDLCALC Invalid, Trig>400.0 10/12/2023    TRIG 408 (H) 10/12/2023    CHOLHDL 15.8 (L) 10/12/2023     Plan:  -Continue home medication  -low fat/low calorie diet          VTE Risk Mitigation (From admission, onward)           Ordered     Reason for No Pharmacological VTE Prophylaxis  Once        Comments: Planned surgical procedure   Question:  Reasons:  Answer:  Physician Provided (leave comment)    05/05/24 1930     IP VTE HIGH RISK PATIENT  Once         05/05/24 1930     Place sequential compression device  Until discontinued         05/05/24 1930                     //Core Measures   -DVT proph: SCDs, withholding anticoagulation pending surgical intervention   -Code status Full    -Surrogate:spouse    Components  of this note were documented using a voice recognition system and are subject to errors not corrected at the time the document was proof read. Please contact the author for any clarifications.       Nicanor Jarrell NP  Department of Hospital Medicine  [facility] - Med Surg 3

## 2024-05-06 NOTE — ANESTHESIA PREPROCEDURE EVALUATION
05/06/2024  Manjinder Witt is a 48 y.o., male.      Pre-op Assessment    I have reviewed the Patient Summary Reports.     I have reviewed the Nursing Notes. I have reviewed the NPO Status.      Review of Systems  Anesthesia Hx:  No problems with previous Anesthesia                Hematology/Oncology:  Hematology Normal   Oncology Normal                                   Cardiovascular:                hyperlipidemia                             Renal/:  Chronic Renal Disease renal calculi               Hepatic/GI:    Hiatal Hernia, GERD, poorly controlled             Neurological:    Neuromuscular Disease,                                   Endocrine:  Endocrine Normal            Dermatological:  Skin Normal    Psych:   anxiety               Patient Active Problem List   Diagnosis    Preop cardiovascular exam    Bradycardia    Family history of heart disease in male family member before age 55    Mixed hyperlipidemia    Hiatal hernia    GERD (gastroesophageal reflux disease)    Kidney stone on right side         Physical Exam  General: Well nourished, Cooperative, Alert and Oriented    Airway:  Mallampati: II / II  Mouth Opening: Normal  TM Distance: Normal  Tongue: Normal  Neck ROM: Normal ROM    Dental:  Intact        Anesthesia Plan  Type of Anesthesia, risks & benefits discussed:    Anesthesia Type: Gen ETT  Intra-op Monitoring Plan: Standard ASA Monitors  Post Op Pain Control Plan: multimodal analgesia  Induction:  IV  Airway Plan: Direct  Informed Consent: Informed consent signed with the Patient and all parties understand the risks and agree with anesthesia plan.  All questions answered.   ASA Score: 2  Day of Surgery Review of History & Physical: H&P Update referred to the surgeon/provider.I have interviewed and examined the patient. I have reviewed the patient's H&P dated: There are no significant  changes. H&P completed by Anesthesiologist.  Anesthesia Plan Notes: Pt reports fairly significant reflux.  Has had nausea and vomiting.     Ready For Surgery From Anesthesia Perspective.     .    Chemistry        Component Value Date/Time     05/05/2024 1737    K 4.6 05/05/2024 1737     05/05/2024 1737    CO2 20 (L) 05/05/2024 1737    BUN 20 05/05/2024 1737    CREATININE 1.9 (H) 05/05/2024 1737     05/05/2024 1737        Component Value Date/Time    CALCIUM 9.3 05/05/2024 1737    ALKPHOS 61 05/05/2024 1737    AST 29 05/05/2024 1737    ALT 36 05/05/2024 1737    BILITOT 2.0 (H) 05/05/2024 1737    ESTGFRAFRICA >60.0 10/26/2021 0826    EGFRNONAA >60.0 10/26/2021 0826        Lab Results   Component Value Date    WBC 10.85 05/05/2024    HGB 13.7 (L) 05/05/2024    HCT 41.1 05/05/2024    MCV 95 05/05/2024     05/05/2024

## 2024-05-06 NOTE — DISCHARGE SUMMARY
[facility]  [facility] Medicine  Discharge Summary      Patient Name: Manjinder Witt  MRN: [id number]  VERONICA: [id number]  Patient Class: OP- Observation  Admission Date: 5/5/2024  Hospital Length of Stay: 0 days  Discharge Date and Time:  05/06/2024 3:35 PM  Attending Physician: Lety Biggs MD   Discharging Provider: Kerry Landon NP  Primary Care Provider: Opal Lara MD    Primary Care Team: Networked reference to record PCT     HPI:   Manjinder Witt is a 48 y.o. male with a PMH  has a past medical history of Anxiety, General anesthetics causing adverse effect in therapeutic use, and Hyperlipidemia.  Presented to the ER for evaluation of right-sided flank pain which started last night.  Patient was diagnosed with a right-sided kidney stone and discharged home with pain medication and strainer.  Patient reports medication he has been taking home has not improving his symptoms which prompted return visit to ER this evening.  Associated symptoms include intermittent nausea and vomiting.  Denies any fever, aches, chills, sweats, chest pain, palpitations, shortness for breath, dyspnea exertion, abdominal pain, hematuria, melena, hematochezia, or any other symptoms at this time.    ER workup revealed creatinine level 1 6 which worsened to 1.9 since previous ER visit.  CT abdomen and pelvis renal study revealed:[3 mm obstructive stone at the right UVJ with resultant mild to moderate hydroureteronephrosis].  Patient received Flomax, 1.5 L of normal saline, 4 mg morphine, 4 mg Zofran, 30 mg Toradol in ER.  On-call urologist consulted.  Recommend overnight observation for IV fluids and Flomax.  Urology will see patient tomorrow as consult.  Patient and family at bedside in agreement with treatment plan.  Patient will be admitted under observation status.    PCP: Opal Lara    Procedure(s) (LRB):  CYSTOSCOPY, WITH URETERAL STENT INSERTION (Right)  URETEROSCOPY (Right)  EXTRACTION - STONE (Right)       Hospital Course:   48 y.o. male presented to the ER for evaluation of right-sided flank pain which started last night.  Patient was diagnosed with a right-sided kidney stone and discharged home with pain medication and strainer.    ER workup revealed creatinine level 1 6 which worsened to 1.9 since previous ER visit.    CT abdomen and pelvis renal study revealed:[3 mm obstructive stone at the right UVJ with resultant mild to moderate hydroureteronephrosis].    Patient made NPO at midnight   Urology consulted  Urology performed cystoscopy with right ureteroscopy per Dr. Garcia on 5/6. Patient will need follow up on wed 5/8/24 for stent removal. Per Dr. Garcia, he will schedule the patient for a follow up in clinic outpatient. Urology filled prescription at time of discharge. Patient educated on importance of follow up with urology for stent removal. Educated on diet improvements to help with GERD s/s and educated on importance of limiting intake of TUMS as patient has been taking them daily. Patient also advised to schedule an appointment with PCP within the next 3-5 days.     Patient seen and examined on the day of discharge.  All questions and concerns were addressed prior to discharge.    Face to face encounter with patient: 36      Goals of Care Treatment Preferences:  Code Status: Full Code      Consults:   Consults (From admission, onward)          Status Ordering Provider     Inpatient consult to Urology  Once        Provider:  Duke Garcia MD    Completed DOUG AREVALO            Cardiac/Vascular  Mixed hyperlipidemia  Patient is chronically on statin.will continue for now. Last Lipid Panel:   Lab Results   Component Value Date    CHOL 215 (H) 10/12/2023    HDL 34 (L) 10/12/2023    LDLCALC Invalid, Trig>400.0 10/12/2023    TRIG 408 (H) 10/12/2023    CHOLHDL 15.8 (L) 10/12/2023     Plan:  -Continue home medication  -low fat/low calorie diet        Renal/  * Kidney stone on right  side  Plan:  -urology consult &  performed cystoscopy with right ureteroscopy per Dr. Garcia on 5/6. Patient will need follow up on wed 5/8/24 for stent removal. Per Dr. Garcia, he will schedule the patient for a follow up in clinic outpatient. Urology filled prescription at time of discharge.       GI  GERD (gastroesophageal reflux disease)  Chronic. Stable. Currently asymptomatic. Home medications include PPI/Antacids as needed.  Plan:  -Continue PPI/Antacids as needed           Final Active Diagnoses:    Diagnosis Date Noted POA    PRINCIPAL PROBLEM:  Kidney stone on right side [N20.0] 05/06/2024 Unknown    GERD (gastroesophageal reflux disease) [K21.9] 06/29/2021 Yes    Mixed hyperlipidemia [E78.2] 11/13/2018 Yes      Problems Resolved During this Admission:       Discharged Condition: good    Disposition:     Follow Up:   Follow-up Information       Opal Lara MD. Schedule an appointment as soon as possible for a visit.    Specialty: Family Medicine  Why: Follow up in 3-5 days  Contact information:  [address] [address] [address] [phone]               Duke Garcia MD. Schedule an appointment as soon as possible for a visit.    Specialty: Urology  Why: Call clinic for appointment time on wednesday 5/8/2024 for stent removal  Contact information:  [address] [facility]  [facility] [address] [phone]                           Patient Instructions:      Diet Cardiac     Activity as tolerated       Significant Diagnostic Studies: Labs: CMP   Recent Labs   Lab 05/05/24 0057 05/05/24  1737    138   K 4.2 4.6    108   CO2 22* 20*   * 107   BUN 18 20   CREATININE 1.6* 1.9*   CALCIUM 9.7 9.3   PROT 7.3 7.0   ALBUMIN 4.0 3.9   BILITOT 0.7 2.0*   ALKPHOS 59 61   AST 27 29   ALT 41 36   ANIONGAP 12 10   , CBC   Recent Labs   Lab 05/05/24 0057 05/05/24  1737   WBC 8.91 10.85   HGB 14.6 13.7*   HCT 43.3 41.1    190   , and All  labs within the past 24 hours have been reviewed  Radiology: X-Ray: CXR: X-Ray Chest 1 View (CXR): No results found for this visit on 05/05/24. and X-Ray Chest PA and Lateral (CXR): No results found for this visit on 05/05/24.    Pending Diagnostic Studies:       Procedure Component Value Units Date/Time    Specimen to Pathology, Surgery Urology [[id number]] Collected: 05/06/24 1204    Order Status: Sent Lab Status: In process Updated: 05/06/24 1323    Specimen: Tissue     Urinary Stone Analysis [[id number]] Collected: 05/06/24 1204    Order Status: Sent Lab Status: In process Updated: 05/06/24 1330    Specimen: Urine from Stone            Medications:  Reconciled Home Medications:      Medication List        START taking these medications      nitrofurantoin (macrocrystal-monohydrate) 100 MG capsule  Commonly known as: MACROBID  Take 1 capsule (100 mg total) by mouth 2 (two) times daily. for 3 days     oxyCODONE 5 MG immediate release tablet  Commonly known as: ROXICODONE  Take 1 tablet (5 mg total) by mouth every 6 (six) hours as needed for Pain.     phenazopyridine 100 MG tablet  Commonly known as: PYRIDIUM  Take 1 tablet (100 mg total) by mouth 3 (three) times daily as needed (Dysuria).     solifenacin 5 MG tablet  Commonly known as: VESICARE  Take 1 tablet (5 mg total) by mouth once daily. for 5 days     tamsulosin 0.4 mg Cap  Commonly known as: FLOMAX  Take 1 capsule (0.4 mg total) by mouth once daily. for 5 days            CONTINUE taking these medications      ondansetron 4 MG tablet  Commonly known as: ZOFRAN  Take 1 tablet (4 mg total) by mouth every 8 (eight) hours as needed for Nausea.     pravastatin 40 MG tablet  Commonly known as: PRAVACHOL  Take 40 mg by mouth every evening.            STOP taking these medications      aspirin 81 MG EC tablet  Commonly known as: ECOTRIN     FLAXSEED ORAL     HYDROcodone-acetaminophen 7.5-325 mg per tablet  Commonly known as: NORCO              Indwelling  Lines/Drains at time of discharge:   Lines/Drains/Airways       None                   Time spent on the discharge of patient: 46 minutes         Kerry Landon NP  Department of Hospital Medicine  [facility]

## 2024-05-06 NOTE — ASSESSMENT & PLAN NOTE
Plan:  -urology consult &  performed cystoscopy with right ureteroscopy per Dr. Garcia on 5/6. Patient will need follow up on wed 5/8/24 for stent removal. Per Dr. Garcia, he will schedule the patient for a follow up in clinic outpatient. Urology filled prescription at time of discharge.

## 2024-05-06 NOTE — SUBJECTIVE & OBJECTIVE
"Past Medical History:   Diagnosis Date    Anxiety     General anesthetics causing adverse effect in therapeutic use     recent sx achilles tendon, given "something to dry up my sinuses and I couldn't hardly breath"    Hyperlipidemia        Past Surgical History:   Procedure Laterality Date    ACHILLES TENDON SURGERY Right     APPENDECTOMY N/A 5/24/2018    Procedure: APPENDECTOMY;  Surgeon: Jericho Kessler MD;  Location: [facility] OR;  Service: General;  Laterality: N/A;    ESOPHAGOGASTRODUODENOSCOPY N/A 6/29/2021    Procedure: ESOPHAGOGASTRODUODENOSCOPY (EGD);  Surgeon: Beatriz Hines MD;  Location: [facility];  Service: Endoscopy;  Laterality: N/A;    FRACTURE SURGERY      left leg pins an screws     NECK MASS EXCISION Left 12/7/2018    Procedure: EXCISION, MASS, NECK;  Surgeon: Alcira Dale MD;  Location: [facility] OR;  Service: ENT;  Laterality: Left;       Review of patient's allergies indicates:  No Known Allergies    Current Facility-Administered Medications   Medication Dose Route Frequency Provider Last Rate Last Admin    acetaminophen suppository 650 mg  650 mg Rectal Q4H PRN Nicanor Jarrell NP        acetaminophen tablet 650 mg  650 mg Oral Q8H PRN Nicanor Jarrell NP        aluminum-magnesium hydroxide-simethicone 200-200-20 mg/5 mL suspension 30 mL  30 mL Oral QID PRN Nicanor Jarrell NP        dextrose 10% bolus 125 mL 125 mL  12.5 g Intravenous PRN Nicanor Jarrell NP        dextrose 10% bolus 250 mL 250 mL  25 g Intravenous PRN Nicanor Jarrell NP        glucagon (human recombinant) injection 1 mg  1 mg Intramuscular PRN Nicanor Jarrell NP        glucose chewable tablet 16 g  16 g Oral PRN Nicanor Jarrell NP        glucose chewable tablet 24 g  24 g Oral PRN Nicanor Jarrell NP        HYDROcodone-acetaminophen 5-325 mg per tablet 1 tablet  1 tablet Oral Q6H PRN Nicanor Jarrell NP        ketorolac injection 15 mg  15 mg Intravenous Q6H Nicanor Jarrell NP   15 mg at 05/06/24 " 0000    lactated ringers infusion   Intravenous Continuous Nicanor Jarrell  mL/hr at 05/05/24 2031 New Bag at 05/05/24 2031    melatonin tablet 6 mg  6 mg Oral Nightly PRN Nicanor Jarrell NP        morphine injection 2 mg  2 mg Intravenous Q4H PRN Nicanor Jarrell NP        naloxone 0.4 mg/mL injection 0.02 mg  0.02 mg Intravenous PRN Nicanor Jarrell NP        ondansetron injection 4 mg  4 mg Intravenous Q8H PRN Nicanor Jarrell NP        polyethylene glycol packet 17 g  17 g Oral Daily PRN Nicanor Jarrell NP        promethazine tablet 25 mg  25 mg Oral Q6H PRN Nicanor Jarrell NP        simethicone chewable tablet 80 mg  1 tablet Oral QID PRN Nicanor Jarrell NP        sodium chloride 0.9% flush 3 mL  3 mL Intravenous Q12H PRN Nicanor Jarrell NP         Family History       Problem Relation (Age of Onset)    Heart disease Father          Tobacco Use    Smoking status: Former     Current packs/day: 0.00     Average packs/day: 1 pack/day for 17.0 years (17.0 ttl pk-yrs)     Types: Vaping with nicotine, Cigarettes     Start date: 6/29/2003     Quit date: 6/29/2020     Years since quitting: 3.8    Smokeless tobacco: Never    Tobacco comments:     no smoking after m.n prior to surgery   Substance and Sexual Activity    Alcohol use: Yes     Alcohol/week: 1.7 standard drinks of alcohol     Types: 2 Standard drinks or equivalent per week     Comment: social  No alcohol 72h prior to sx    Drug use: No    Sexual activity: Yes     Partners: Female     Birth control/protection: None     Review of Systems   Constitutional:  Negative for chills, diaphoresis, fatigue and fever.   Gastrointestinal:  Positive for nausea and vomiting.   Genitourinary:  Positive for flank pain (right). Negative for hematuria.   All other systems reviewed and are negative.    Objective:     Vital Signs (Most Recent):  Temp: 98.2 °F (36.8 °C) (05/06/24 0003)  Pulse: (!) 48 (05/06/24 0003)  Resp: 18 (05/06/24  0003)  BP: (!) 102/52 (05/06/24 0003)  SpO2: (!) 93 % (05/06/24 0003) Vital Signs (24h Range):  Temp:  [97.7 °F (36.5 °C)-98.2 °F (36.8 °C)] 98.2 °F (36.8 °C)  Pulse:  [44-48] 48  Resp:  [16-24] 18  SpO2:  [93 %-100 %] 93 %  BP: (102-148)/(52-66) 102/52     Weight: 104.3 kg (230 lb)  Body mass index is 31.19 kg/m².     Physical Exam  Vitals and nursing note reviewed.   Constitutional:       General: He is awake. He is not in acute distress.     Appearance: Normal appearance. He is well-developed and well-groomed. He is not ill-appearing, toxic-appearing or diaphoretic.   HENT:      Head: Normocephalic and atraumatic.   Eyes:      Extraocular Movements: Extraocular movements intact.      Conjunctiva/sclera: Conjunctivae normal.   Cardiovascular:      Rate and Rhythm: Normal rate and regular rhythm.      Pulses: Normal pulses.      Heart sounds: Normal heart sounds. No murmur heard.  Pulmonary:      Effort: Pulmonary effort is normal.      Breath sounds: Normal breath sounds.   Abdominal:      General: Bowel sounds are normal.      Palpations: Abdomen is soft.      Tenderness: There is no abdominal tenderness. There is no guarding or rebound.   Musculoskeletal:      Cervical back: Normal range of motion and neck supple.      Comments: 5/5 strength throughout   Skin:     General: Skin is warm and dry.      Capillary Refill: Capillary refill takes less than 2 seconds.   Neurological:      General: No focal deficit present.      Mental Status: He is alert and oriented to person, place, and time. Mental status is at baseline.      GCS: GCS eye subscore is 4. GCS verbal subscore is 5. GCS motor subscore is 6.      Cranial Nerves: Cranial nerves 2-12 are intact.      Sensory: Sensation is intact.      Motor: Motor function is intact.   Psychiatric:         Mood and Affect: Mood normal.         Behavior: Behavior normal. Behavior is cooperative.              LABS:  Recent Results (from the past 24 hour(s))   HIV 1/2 Ag/Ab  (4th Gen)    Collection Time: 05/05/24  5:37 PM   Result Value Ref Range    HIV 1/2 Ag/Ab Negative Negative   Hepatitis C Antibody    Collection Time: 05/05/24  5:37 PM   Result Value Ref Range    Hepatitis C Ab Negative Negative   HCV Virus Hold Specimen    Collection Time: 05/05/24  5:37 PM   Result Value Ref Range    HEP C Virus Hold Specimen Hold for HCV sendout    CBC auto differential    Collection Time: 05/05/24  5:37 PM   Result Value Ref Range    WBC 10.85 3.90 - 12.70 K/uL    RBC 4.35 (L) 4.60 - 6.20 M/uL    Hemoglobin 13.7 (L) 14.0 - 18.0 g/dL    Hematocrit 41.1 40.0 - 54.0 %    MCV 95 82 - 98 fL    MCH 31.5 (H) 27.0 - 31.0 pg    MCHC 33.3 32.0 - 36.0 g/dL    RDW 11.9 11.5 - 14.5 %    Platelets 190 150 - 450 K/uL    MPV 10.3 9.2 - 12.9 fL    Immature Granulocytes 0.4 0.0 - 0.5 %    Gran # (ANC) 8.9 (H) 1.8 - 7.7 K/uL    Immature Grans (Abs) 0.04 0.00 - 0.04 K/uL    Lymph # 1.1 1.0 - 4.8 K/uL    Mono # 0.8 0.3 - 1.0 K/uL    Eos # 0.0 0.0 - 0.5 K/uL    Baso # 0.03 0.00 - 0.20 K/uL    nRBC 0 0 /100 WBC    Gran % 81.5 (H) 38.0 - 73.0 %    Lymph % 10.3 (L) 18.0 - 48.0 %    Mono % 7.2 4.0 - 15.0 %    Eosinophil % 0.3 0.0 - 8.0 %    Basophil % 0.3 0.0 - 1.9 %    Platelet Estimate Appears normal     Differential Method Automated    Comprehensive metabolic panel    Collection Time: 05/05/24  5:37 PM   Result Value Ref Range    Sodium 138 136 - 145 mmol/L    Potassium 4.6 3.5 - 5.1 mmol/L    Chloride 108 95 - 110 mmol/L    CO2 20 (L) 23 - 29 mmol/L    Glucose 107 70 - 110 mg/dL    BUN 20 6 - 20 mg/dL    Creatinine 1.9 (H) 0.5 - 1.4 mg/dL    Calcium 9.3 8.7 - 10.5 mg/dL    Total Protein 7.0 6.0 - 8.4 g/dL    Albumin 3.9 3.5 - 5.2 g/dL    Total Bilirubin 2.0 (H) 0.1 - 1.0 mg/dL    Alkaline Phosphatase 61 55 - 135 U/L    AST 29 10 - 40 U/L    ALT 36 10 - 44 U/L    eGFR 43 (A) >60 mL/min/1.73 m^2    Anion Gap 10 8 - 16 mmol/L   Urinalysis - Clean Catch    Collection Time: 05/05/24 10:19 PM   Result Value Ref Range     Specimen UA Urine, Clean Catch     Color, UA Yellow Yellow, Straw, Jackeline    Appearance, UA Clear Clear    pH, UA 7.0 5.0 - 8.0    Specific Gravity, UA >1.030 (A) 1.005 - 1.030    Protein, UA Trace (A) Negative    Glucose, UA Negative Negative    Ketones, UA 1+ (A) Negative    Bilirubin (UA) Negative Negative    Occult Blood UA Negative Negative    Nitrite, UA Negative Negative    Urobilinogen, UA Negative <2.0 EU/dL    Leukocytes, UA Negative Negative       RADIOLOGY  CT Renal Stone Study ABD Pelvis WO    Result Date: 5/5/2024  EXAMINATION: CT RENAL STONE STUDY ABD PELVIS WO CLINICAL HISTORY: Flank pain, kidney stone suspected; TECHNIQUE: Low dose axial images, sagittal and coronal reformations were obtained from the lung bases to the pubic symphysis, Oral contrast was not administered. COMPARISON: None FINDINGS: Heart: Normal in size. No pericardial effusion. Lung Bases: Well aerated, without consolidation or pleural fluid. Liver: Normal in size and attenuation, with no focal hepatic lesions. Gallbladder: No calcified gallstones. Bile Ducts: No evidence of dilated ducts. Pancreas: No mass or peripancreatic fat stranding. Spleen: Unremarkable. Adrenals: Unremarkable. Kidneys/ Ureters: 3 mm obstructive stone at the right UVJ with resultant mild to moderate hydroureteronephrosis. Bladder: No evidence of wall thickening. Reproductive organs: Unremarkable. GI Tract/Mesentery: No evidence of bowel obstruction or inflammation. Peritoneal Space: No ascites. No free air. Retroperitoneum: No significant adenopathy. Abdominal wall: Unremarkable. Vasculature: No significant atherosclerosis or aneurysm. Bones: No acute fracture.     3 mm obstructive stone at the right UVJ with resultant mild to moderate hydroureteronephrosis. Electronically signed by: Ava Mohr Date:    05/05/2024 Time:    01:16      EKG    MICROBIOLOGY    MDM     Amount and/or Complexity of Data Reviewed  Clinical lab tests: reviewed  Tests in the  radiology section of CPT®: reviewed  Tests in the medicine section of CPT®: reviewed  Discussion of test results with the performing providers: yes  Decide to obtain previous medical records or to obtain history from someone other than the patient: yes  Obtain history from someone other than the patient: yes  Review and summarize past medical records: yes  Discuss the patient with other providers: yes  Independent visualization of images, tracings, or specimens: yes

## 2024-05-06 NOTE — ANESTHESIA PROCEDURE NOTES
Intubation    Date/Time: 5/6/2024 11:41 AM    Performed by: Bridget Cohen CRNA  Authorized by: Joey Hilario MD    Intubation:     Induction:  Rapid sequence induction    Intubated:  Postinduction    Mask Ventilation:  Not attempted    Attempts:  1    Attempted By:  CRNA    Method of Intubation:  Direct    Blade:  Staci 3    Laryngeal View Grade: Grade I - full view of cords      Difficult Airway Encountered?: No      Complications:  None    Airway Device:  Oral endotracheal tube    Airway Device Size:  7.5    Style/Cuff Inflation:  Cuffed (inflated to minimal occlusive pressure)    Tube secured:  22    Secured at:  The lips    Placement Verified By:  Capnometry and Revisualization with laryngoscopy    Complicating Factors:  None    Findings Post-Intubation:  BS equal bilateral and atraumatic/condition of teeth unchanged

## 2024-05-06 NOTE — INTERVAL H&P NOTE
The patient has been examined and the H&P has been reviewed:    I concur with the findings and changes have been noted since the H&P was written: To OR for right URS/LL and stent    Anesthesia/Surgery risks, benefits and alternative options discussed and understood by patient/family.      Active Hospital Problems    Diagnosis  POA    *Kidney stone on right side [N20.0]  Unknown    GERD (gastroesophageal reflux disease) [K21.9]  Yes    Mixed hyperlipidemia [E78.2]  Yes      Resolved Hospital Problems   No resolved problems to display.

## 2024-05-06 NOTE — PLAN OF CARE
[facility]  Discharge Final Note    Primary Care Provider: Opal Lara MD    Expected Discharge Date: 5/6/2024    Final Discharge Note (most recent)       Final Note - 05/06/24 1538          Final Note    Assessment Type Final Discharge Note     Anticipated Discharge Disposition Home or Self Care        Post-Acute Status    Discharge Delays None known at this time                     Important Message from Medicare             Contact Info       Opal Lara MD   Specialty: Family Medicine   Relationship: PCP - General    [address] [place] [id number]   Phone: [phone]       Next Steps: Schedule an appointment as soon as possible for a visit    Instructions: Follow up in 3-5 days    Duke Garcia MD   Specialty: Urology    [address] [facility] [address]   Phone: [phone]       Next Steps: Schedule an appointment as soon as possible for a visit    Instructions: Call clinic for appointment time on wednesday 5/8/2024 for stent removal          Discharge home, no home health or dme orders noted.   no

## 2024-05-06 NOTE — ANESTHESIA POSTPROCEDURE EVALUATION
Anesthesia Post Evaluation    Patient: Manjinder Witt    Procedure(s) Performed: Procedure(s) (LRB):  CYSTOSCOPY, WITH URETERAL STENT INSERTION (Right)  URETEROSCOPY (Right)  EXTRACTION - STONE (Right)    Final Anesthesia Type: general      Patient location during evaluation: PACU  Patient participation: Yes- Able to Participate  Level of consciousness: awake and alert  Post-procedure vital signs: reviewed and stable  Pain management: adequate  Airway patency: patent  NAVEED mitigation strategies: Verification of full reversal of neuromuscular block  PONV status at discharge: No PONV  Anesthetic complications: no      Cardiovascular status: hemodynamically stable  Respiratory status: spontaneous ventilation  Hydration status: euvolemic  Follow-up not needed.              Vitals Value Taken Time   /59 05/06/24 1245   Temp 36.6 °C (97.8 °F) 05/06/24 1210   Pulse 51 05/06/24 1250   Resp 37 05/06/24 1250   SpO2 91 % 05/06/24 1250   Vitals shown include unfiled device data.      Event Time   Out of Recovery 05/06/2024 12:51:15         Pain/Gio Score: Pain Rating Prior to Med Admin: 3 (5/6/2024  5:58 AM)  Gio Score: 9 (5/6/2024 12:45 PM)

## 2024-05-06 NOTE — DISCHARGE INSTRUCTIONS
Follow up with PCP and call urology clinic at [phone] to get appointment time for Wednesday 5/8/24

## 2024-05-07 ENCOUNTER — NURSE TRIAGE (OUTPATIENT)
Dept: ADMINISTRATIVE | Facility: CLINIC | Age: 49
End: 2024-05-07
Payer: COMMERCIAL

## 2024-05-07 ENCOUNTER — PATIENT MESSAGE (OUTPATIENT)
Dept: UROLOGY | Facility: CLINIC | Age: 49
End: 2024-05-07
Payer: COMMERCIAL

## 2024-05-07 ENCOUNTER — TELEPHONE (OUTPATIENT)
Dept: UROLOGY | Facility: CLINIC | Age: 49
End: 2024-05-07
Payer: COMMERCIAL

## 2024-05-07 DIAGNOSIS — N20.0 KIDNEY STONE ON RIGHT SIDE: Primary | ICD-10-CM

## 2024-05-07 LAB
FINAL PATHOLOGIC DIAGNOSIS: NORMAL
GROSS: NORMAL
Lab: NORMAL
OHS QRS DURATION: 84 MS
OHS QTC CALCULATION: 395 MS

## 2024-05-07 NOTE — TELEPHONE ENCOUNTER
Appointment scheduled, patient notified.     ----- Message from Duke Garcia MD sent at 5/7/2024  7:35 AM CDT -----  F/u 6 weeks with renal US

## 2024-05-08 ENCOUNTER — TELEPHONE (OUTPATIENT)
Dept: UROLOGY | Facility: CLINIC | Age: 49
End: 2024-05-08
Payer: COMMERCIAL

## 2024-05-08 LAB — BACTERIA UR CULT: NO GROWTH

## 2024-05-08 NOTE — TELEPHONE ENCOUNTER
Pt was discharged from hospital last night. Was told he could remove stent tonight if he wanted and he did about an hour ago. Since he is having pain rated 8/10. Took pain med around 5pm.     Dispo- go to ED now or pcp triage, successfully reached on call provider dr. Gabriel. He advised if pt cannot get his pain under control he should go to ED, and ok'ed for pt to try ibuprofen or tylenol in addition to oxycodone. Caller advised and VU.  Reason for Disposition   [1] SEVERE pain (e.g., excruciating, scale 8-10) AND [2] not improved after pain medicine    Additional Information   Negative: Shock suspected (e.g., cold/pale/clammy skin, too weak to stand, low BP, rapid pulse)   Negative: Sounds like a life-threatening emergency to the triager   Negative: [1] Unable to urinate (or only a few drops) > 4 hours AND [2] bladder feels very full (e.g., palpable bladder or strong urge to urinate)    Protocols used: Kidney Stone Follow-up Call-A-

## 2024-05-09 ENCOUNTER — PATIENT OUTREACH (OUTPATIENT)
Dept: ADMINISTRATIVE | Facility: CLINIC | Age: 49
End: 2024-05-09
Payer: COMMERCIAL

## 2024-05-09 ENCOUNTER — PATIENT MESSAGE (OUTPATIENT)
Dept: FAMILY MEDICINE | Facility: CLINIC | Age: 49
End: 2024-05-09
Payer: COMMERCIAL

## 2024-05-09 LAB
COMPN STONE: NORMAL
LABORATORY COMMENT REPORT: NORMAL
SPECIMEN SOURCE: NORMAL
STONE ANALYSIS IR-IMP: NORMAL

## 2024-05-09 NOTE — PROGRESS NOTES
C3 nurse spoke with Manjinder Witt  for a TCC post hospital discharge follow up call. The patient does not have a scheduled HOSFU appointment with Opal Lara MD  within 5-7 days post hospital discharge date 05/06/2024. C3 nurse was unable to schedule HOSFU appointment in [facility].  Please contact patient and schedule follow up appointment using HOSFU visit type on or before 05/13/2024.    MESSAGE SENT TO MD STAFF

## (undated) DEVICE — SEE MEDLINE ITEM 152487

## (undated) DEVICE — SOL NS 1000CC

## (undated) DEVICE — GUIDE WIRE MOTION .035 X 150CM

## (undated) DEVICE — SUT VICRYL 0 SH

## (undated) DEVICE — SUT SILK 0 SUTUPAK SA86H

## (undated) DEVICE — SWAB CULTURETTE II DUAL

## (undated) DEVICE — NDL SAFETY 22G X 1.5 ECLIPSE

## (undated) DEVICE — CLOSURE SKIN STERI STRIP 1/2X4

## (undated) DEVICE — NDL HYPO 27G X 1 1/2

## (undated) DEVICE — ELECTRODE BLADE INSULATED 1 IN

## (undated) DEVICE — DRESSING TRANS 4X4 TEGADERM

## (undated) DEVICE — CATH URETERAL DUAL LUMEN 10FR

## (undated) DEVICE — SEE MEDLINE ITEM 157027

## (undated) DEVICE — SYR 30CC LUER LOCK

## (undated) DEVICE — CONTAINER SPECIMEN OR STER 4OZ

## (undated) DEVICE — SYR ONLY LUER LOCK 20CC

## (undated) DEVICE — DRESSING TRANS 2X2 TEGADERM

## (undated) DEVICE — SPONGE COTTON TRAY 4X4IN

## (undated) DEVICE — CATH POLLACK OPEN-END FLEXI-TI

## (undated) DEVICE — DRESSING TELFA STRL 4X3 LF

## (undated) DEVICE — COVER LIGHT HANDLE 80/CA

## (undated) DEVICE — DRAPE LAP TIBURON 77X122IN

## (undated) DEVICE — SEE MEDLINE ITEM 157117

## (undated) DEVICE — SUT SILK 2-0 STRANDS 30IN

## (undated) DEVICE — GLOVE PROTEXIS HYDROGEL SZ7

## (undated) DEVICE — TRAY SKIN SCRUB WET PREMIUM

## (undated) DEVICE — IRRIGATION SET Y-TYPE TUR/BLAD

## (undated) DEVICE — TOWEL OR DISP STRL BLUE 4/PK

## (undated) DEVICE — SPONGE LAP 18X18 PREWASHED

## (undated) DEVICE — SUT VICRYL 3-0 27 SH

## (undated) DEVICE — COVER OVERHEAD SURG LT BLUE

## (undated) DEVICE — TUBE SPEC COLL&TRANSPORT 11ML

## (undated) DEVICE — GLOVE SIGNATURE ESSNTL LTX 7.5

## (undated) DEVICE — ADHESIVE MASTISOL VIAL 48/BX

## (undated) DEVICE — GOWN POLY REINF X-LONG XL

## (undated) DEVICE — CANISTER SUCTION JUMBO 12L

## (undated) DEVICE — MANIFOLD 4 PORT

## (undated) DEVICE — ELECTRODE REM PLYHSV RETURN 9

## (undated) DEVICE — SOL IRRI STRL WATER 1000ML

## (undated) DEVICE — GOWN POLY REINF BRTH SLV XL

## (undated) DEVICE — SOL NACL IRR 3000ML

## (undated) DEVICE — UROVIEW 2600/2800

## (undated) DEVICE — DRAPE T CYSTOSCOPY STERILE

## (undated) DEVICE — SEE MEDLINE ITEM 146347

## (undated) DEVICE — SYR 10CC LUER LOCK

## (undated) DEVICE — SEE MEDLINE ITEM 157166

## (undated) DEVICE — GLOVE PROTEXIS HYDROGEL SZ6

## (undated) DEVICE — SEE MEDLINE ITEM 152622

## (undated) DEVICE — CORD BIPOLAR ELECTROSURGICAL

## (undated) DEVICE — SUT 2/0 30IN SILK BLK BRAI

## (undated) DEVICE — SPONGE DERMACEA 4X4IN 12PLY

## (undated) DEVICE — SEE MEDLINE ITEM 152739

## (undated) DEVICE — EXTRACTOR TIPLESS 2.4FRX1115CM

## (undated) DEVICE — SUT VICRYL PLUS 4-0 P3 18IN

## (undated) DEVICE — Device

## (undated) DEVICE — BOWL STERILE LARGE 32OZ